# Patient Record
Sex: MALE | Race: WHITE | NOT HISPANIC OR LATINO | Employment: PART TIME | URBAN - METROPOLITAN AREA
[De-identification: names, ages, dates, MRNs, and addresses within clinical notes are randomized per-mention and may not be internally consistent; named-entity substitution may affect disease eponyms.]

---

## 2023-08-02 ENCOUNTER — HOSPITAL ENCOUNTER (OUTPATIENT)
Dept: DATA CONVERSION | Facility: HOSPITAL | Age: 68
End: 2023-08-02
Attending: UROLOGY | Admitting: UROLOGY
Payer: MEDICARE

## 2023-08-02 DIAGNOSIS — N20.0 CALCULUS OF KIDNEY: ICD-10-CM

## 2023-09-29 VITALS — WEIGHT: 189.15 LBS | HEIGHT: 64 IN | BODY MASS INDEX: 32.29 KG/M2

## 2023-10-01 NOTE — OP NOTE
PROCEDURE DETAILS    Preoperative Diagnosis:  Calcium kidney stone, N20.0    Postoperative Diagnosis:  Calcium kidney stone, N20.0    Surgeon: Adrian Renner  Resident/Fellow/Other Assistant: None of these were associated with this case    Procedure:  1. LEFT ESWL    Anesthesia: No anesthesiologist associated with this case  Estimated Blood Loss: 0  Findings: Left renal ESWL at [facility]  Specimens(s) Collected: no,     Complications: None  Patient Returned To/Condition: Care    Date of Dictation: 02-Aug-2023  Dictated By: SHARLENE  Dictation Job Number: [id number]                            Attestation:   Note Completion:  Attending Attestation I performed the procedure without a resident         Electronic Signatures:  Adrian Renner)  (Signed 02-Aug-2023 14:21)   Authored: Post-Operative Note, Chart Review, Note Completion      Last Updated: 02-Aug-2023 14:21 by Adrian Renner)

## 2023-10-02 ENCOUNTER — HOSPITAL ENCOUNTER (OUTPATIENT)
Dept: RADIOLOGY | Facility: HOSPITAL | Age: 68
Discharge: HOME | End: 2023-10-02
Payer: MEDICARE

## 2023-10-02 DIAGNOSIS — C18.7 MALIGNANT NEOPLASM OF SIGMOID COLON (MULTI): ICD-10-CM

## 2023-10-02 PROCEDURE — 71260 CT THORAX DX C+: CPT

## 2023-10-02 PROCEDURE — 71260 CT THORAX DX C+: CPT | Performed by: RADIOLOGY

## 2023-10-02 PROCEDURE — 74177 CT ABD & PELVIS W/CONTRAST: CPT | Performed by: RADIOLOGY

## 2023-10-02 PROCEDURE — 2550000001 HC RX 255 CONTRASTS: Performed by: INTERNAL MEDICINE

## 2023-10-02 PROCEDURE — 74177 CT ABD & PELVIS W/CONTRAST: CPT

## 2023-10-02 RX ADMIN — IOHEXOL 75 ML: 350 INJECTION, SOLUTION INTRAVENOUS at 11:58

## 2023-10-06 PROBLEM — I25.10 CAD (CORONARY ARTERY DISEASE): Status: ACTIVE | Noted: 2023-10-06

## 2023-10-06 PROBLEM — R07.89 CHEST PAIN, ATYPICAL: Status: ACTIVE | Noted: 2023-10-06

## 2023-10-06 PROBLEM — I49.5 SICK SINUS SYNDROME (MULTI): Status: ACTIVE | Noted: 2023-10-06

## 2023-10-06 PROBLEM — E78.5 HYPERLIPIDEMIA: Status: ACTIVE | Noted: 2023-10-06

## 2023-10-06 PROBLEM — R22.9 SUBCUTANEOUS MASS: Status: ACTIVE | Noted: 2023-10-06

## 2023-10-06 PROBLEM — C18.9 COLON ADENOCARCINOMA (MULTI): Status: ACTIVE | Noted: 2023-10-06

## 2023-10-06 PROBLEM — D3A.8: Status: ACTIVE | Noted: 2023-10-06

## 2023-10-06 PROBLEM — R19.00 ABDOMINAL MASS: Status: ACTIVE | Noted: 2023-10-06

## 2023-10-06 PROBLEM — K63.5 COLON POLYPS: Status: ACTIVE | Noted: 2023-10-06

## 2023-10-06 PROBLEM — K76.9 LIVER LESION, LEFT LOBE: Status: ACTIVE | Noted: 2023-10-06

## 2023-10-06 PROBLEM — R00.1 BRADYCARDIA: Status: ACTIVE | Noted: 2023-10-06

## 2023-10-06 PROBLEM — Z85.038 HISTORY OF COLON CANCER: Status: ACTIVE | Noted: 2023-10-06

## 2023-10-06 PROBLEM — K21.9 ACID REFLUX: Status: ACTIVE | Noted: 2023-10-06

## 2023-10-06 PROBLEM — I10 HTN (HYPERTENSION): Status: ACTIVE | Noted: 2023-10-06

## 2023-10-06 PROBLEM — Z95.828 PORT-A-CATH IN PLACE: Status: ACTIVE | Noted: 2023-10-06

## 2023-10-06 PROBLEM — Z95.1 S/P CABG X 4: Status: ACTIVE | Noted: 2023-10-06

## 2023-10-06 PROBLEM — N28.9 KIDNEY LESION, NATIVE, LEFT: Status: ACTIVE | Noted: 2023-10-06

## 2023-10-06 RX ORDER — METOPROLOL SUCCINATE 25 MG/1
25 TABLET, EXTENDED RELEASE ORAL EVERY 24 HOURS
COMMUNITY
Start: 2022-04-23 | End: 2023-12-01 | Stop reason: SDUPTHER

## 2023-10-06 RX ORDER — LISINOPRIL 20 MG/1
20 TABLET ORAL EVERY 24 HOURS
COMMUNITY
End: 2023-12-01 | Stop reason: SDUPTHER

## 2023-10-06 RX ORDER — CHOLESTYRAMINE 4 G/9G
1 POWDER, FOR SUSPENSION ORAL
COMMUNITY
Start: 2021-11-11 | End: 2023-12-01 | Stop reason: ALTCHOICE

## 2023-10-06 RX ORDER — BUPROPION HYDROCHLORIDE 100 MG/1
100 TABLET, FILM COATED ORAL EVERY 24 HOURS
COMMUNITY
Start: 2022-04-13 | End: 2023-12-01 | Stop reason: SDUPTHER

## 2023-10-06 RX ORDER — CITALOPRAM 40 MG/1
40 TABLET, FILM COATED ORAL DAILY
COMMUNITY
Start: 2018-08-03 | End: 2023-12-01 | Stop reason: SDUPTHER

## 2023-10-06 RX ORDER — CLONAZEPAM 0.5 MG/1
0.5 TABLET ORAL 2 TIMES DAILY
COMMUNITY
End: 2023-12-01 | Stop reason: SDUPTHER

## 2023-10-06 RX ORDER — AMLODIPINE BESYLATE 5 MG/1
5 TABLET ORAL EVERY 24 HOURS
COMMUNITY
Start: 2022-03-21 | End: 2023-12-01 | Stop reason: SDUPTHER

## 2023-10-06 RX ORDER — GABAPENTIN 300 MG/1
300 CAPSULE ORAL EVERY 12 HOURS
COMMUNITY
Start: 2021-09-24 | End: 2023-11-09

## 2023-10-06 RX ORDER — ATORVASTATIN CALCIUM 80 MG/1
80 TABLET, FILM COATED ORAL
COMMUNITY
Start: 2018-05-04 | End: 2023-12-01 | Stop reason: SDUPTHER

## 2023-10-09 ENCOUNTER — TELEPHONE (OUTPATIENT)
Dept: PRIMARY CARE | Facility: CLINIC | Age: 68
End: 2023-10-09
Payer: MEDICARE

## 2023-10-09 DIAGNOSIS — U07.1 COVID: Primary | ICD-10-CM

## 2023-10-09 RX ORDER — NIRMATRELVIR AND RITONAVIR 300-100 MG
3 KIT ORAL 2 TIMES DAILY
Qty: 30 TABLET | Refills: 0 | Status: SHIPPED | OUTPATIENT
Start: 2023-10-09 | End: 2023-10-09 | Stop reason: SDUPTHER

## 2023-10-09 RX ORDER — NIRMATRELVIR AND RITONAVIR 300-100 MG
3 KIT ORAL 2 TIMES DAILY
Qty: 30 TABLET | Refills: 0 | Status: SHIPPED
Start: 2023-10-09 | End: 2023-12-01 | Stop reason: ALTCHOICE

## 2023-10-09 NOTE — PROGRESS NOTES
Subjective   Reason for Visit: Marnie Jha is an 68 y.o. male here for a Medicare Wellness visit.               HPI    No care team member to display     Review of Systems    Objective   Vitals:  There were no vitals taken for this visit.      Physical Exam    Assessment/Plan   Problem List Items Addressed This Visit    None

## 2023-10-09 NOTE — TELEPHONE ENCOUNTER
Pts wife called stating Marnie has covid and was wondering if you could send in paxlovid for him to the [facility] pharmacy on file. He is not a pt of yours yet but he does have an appt scheduled for Dec 1st.

## 2023-10-10 ENCOUNTER — TELEMEDICINE (OUTPATIENT)
Dept: GASTROENTEROLOGY | Facility: CLINIC | Age: 68
End: 2023-10-10
Payer: MEDICARE

## 2023-10-10 DIAGNOSIS — Z12.11 ENCOUNTER FOR SCREENING COLONOSCOPY: Primary | ICD-10-CM

## 2023-10-10 PROCEDURE — 99214 OFFICE O/P EST MOD 30 MIN: CPT | Performed by: NURSE PRACTITIONER

## 2023-10-10 RX ORDER — SOD SULF/POT CHLORIDE/MAG SULF 1.479 G
1 TABLET ORAL ONCE
Qty: 1 TABLET | Refills: 0 | Status: SHIPPED | OUTPATIENT
Start: 2023-10-10 | End: 2023-10-10

## 2023-10-10 NOTE — PROGRESS NOTES
This note was created using voice recognition transcription software. Despite proofreading, unintentional typographical errors may be present. Please contact the GI office with any questions or concerns.     This note was created using voice recognition transcription software. Despite proofreading, unintentional typographical errors may be present. Please contact the GI office with any questions or concerns.     This is a 67 yo M who preferred a phone call today for a follow-up.   He comes to the appointment unaccompanied. He is an established patient of Dr. Schultz. LOV 10/28/21. I saw him on 8/31/23 for a colonoscopy screening.  He has a PMH of duodenal neuroendocrine tumor stage III and colonic adenocarcinoma s/p sigmoid colectomy, distal gastrectomy, and chemo (7/30/20 with Dr. Gonzalez), CAD s/p CABG x 4 on ASA, aortic dilation, nephrolithiasis s/p lithotripsy, one episode of A fib on Eliquis (reportedly resolved per him and taken of AC after 2 weeks),HTN, HLD, COPD, ROBERT.     8/31/23-Has mild constipation and has a BM every 2 days. Doesn't take anything for it. Reports having burning in his esophagus and chest so bad that the acid goes into his nose in the middle of the night. Admits to eating the wrong foods at times. On Omeprazole 20 mg bid and Famotidine (unknown dose/frequency). Somewhat helpful.      10/10/23-Procedures not scheduled until November. He started taking a fiber capsule twice daily and now has a BM daily with no constipation.  He is still on a PPI.  Denies heartburn.           EGD-10/8/21 showing medium sized HH and gastritis, 8/24/20, 7/17/20  Colonoscopy-10/8/21 showing grade I internal hemorrhoids, 1-5 mm polyp in transverse colon, diverticulosis in entire colon; repeat in 2 years. 7/17/20  BM-Everyday.  Taking a fiber capsule twice a day.  Denies constipation.  No rectal bleeding or weight los.    FHX-Denies   SHX-Denies   Ab Sx-Sigmoid resection (7/30/20), olimpia  NSAIDs-Just started Aleve for  arthritis and headaches.      Current Medications: reviewed    A 10 point review of system is negative except for what is mentioned in the HPI    Vital Signs: Reviewed    Physical Exam:  General: no apparent distress, pleasant and cooperative  Skin: Warm and dry, no jaundice  HEENT: No scleral icterus, no conjunctival pallor, normocephalic, atraumatic, mucous membranes moist  Neck: atraumatic, trachea midline, no JVD  Chest: Clear to auscultation bilaterally. No wheezes, rales, or rhonchi  CV: Regular rate and rhythm. Positive S1/S2  Abdomen: no distension, +BS, soft, non-tender to palpation, no rebound tenderness, no guarding, no rigidity, no discernible ascites   Extremities: no lower extremity edema, Chronic pigmentary changes, no cyanosis  Neurological: A&Ox3 , no asterixis  Psychiatric: cooperative     Investigations:  Labs, radiological imaging and cardiac work up were reviewed          Visit Vitals  Smoking Status Never Assessed        Medication Documentation Review Audit    **Prior to Admission medications have not yet been reviewed**          Assessment:    This is a 67 yo M who preferred a phone call today for a follow-up.   He comes to the appointment unaccompanied. He is an established patient of Dr. Schultz. LOV 10/28/21. I saw him on 8/31/23 for a colonoscopy screening.  He has a PMH of duodenal neuroendocrine tumor stage III and colonic adenocarcinoma s/p sigmoid colectomy, distal gastrectomy, and chemo (7/30/20 with Dr. Gonzalez), CAD s/p CABG x 4 on ASA, aortic dilation, nephrolithiasis s/p lithotripsy, one episode of A fib on Eliquis (reportedly resolved per him and taken of AC after 2 weeks),HTN, HLD, COPD, ROBERT.   He denies constipation issues since starting a fiber supplement.  Daily BM's with no pushing/straining.  Had a lot of questions about prep and the procedures in general.    Plan  1.)  Colonoscopy screening-Colonoscopy scheduled 11/13 with Dr. Hayes.  Will send Rx for Sutab as he refuses any  other prep.  Explained prep is important for a successful exam and if he experiences constipation, he should call the office for further instructions.  EGD is scheduled for this day as well.  2.)  Follow up 2-3 weeks after procedure.        Total time spent was 30 minutes.   Actual phone call was about 11 minutes.

## 2023-11-02 ENCOUNTER — PREP FOR PROCEDURE (OUTPATIENT)
Dept: GASTROENTEROLOGY | Facility: HOSPITAL | Age: 68
End: 2023-11-02
Payer: MEDICARE

## 2023-11-08 DIAGNOSIS — G62.9 NEUROPATHY: Primary | ICD-10-CM

## 2023-11-09 ENCOUNTER — TELEPHONE (OUTPATIENT)
Dept: CARDIOLOGY | Facility: CLINIC | Age: 68
End: 2023-11-09
Payer: MEDICARE

## 2023-11-09 RX ORDER — GABAPENTIN 300 MG/1
300 CAPSULE ORAL 2 TIMES DAILY
Qty: 180 CAPSULE | Refills: 0 | Status: SHIPPED | OUTPATIENT
Start: 2023-11-09 | End: 2023-11-13 | Stop reason: SDUPTHER

## 2023-11-10 ENCOUNTER — TELEPHONE (OUTPATIENT)
Dept: PREADMISSION TESTING | Facility: HOSPITAL | Age: 68
End: 2023-11-10

## 2023-11-12 ENCOUNTER — HOSPITAL ENCOUNTER (EMERGENCY)
Facility: HOSPITAL | Age: 68
Discharge: HOME | End: 2023-11-12
Attending: EMERGENCY MEDICINE
Payer: MEDICARE

## 2023-11-12 ENCOUNTER — APPOINTMENT (OUTPATIENT)
Dept: RADIOLOGY | Facility: HOSPITAL | Age: 68
End: 2023-11-12
Payer: MEDICARE

## 2023-11-12 VITALS
HEART RATE: 70 BPM | TEMPERATURE: 99.1 F | WEIGHT: 180 LBS | RESPIRATION RATE: 26 BRPM | SYSTOLIC BLOOD PRESSURE: 146 MMHG | BODY MASS INDEX: 30.73 KG/M2 | HEIGHT: 64 IN | DIASTOLIC BLOOD PRESSURE: 84 MMHG | OXYGEN SATURATION: 98 %

## 2023-11-12 DIAGNOSIS — R07.9 CHEST PAIN, UNSPECIFIED TYPE: Primary | ICD-10-CM

## 2023-11-12 LAB
ALBUMIN SERPL BCP-MCNC: 4.2 G/DL (ref 3.4–5)
ALP SERPL-CCNC: 81 U/L (ref 33–136)
ALT SERPL W P-5'-P-CCNC: 19 U/L (ref 10–52)
ANION GAP SERPL CALC-SCNC: 10 MMOL/L (ref 10–20)
AST SERPL W P-5'-P-CCNC: 22 U/L (ref 9–39)
BASOPHILS # BLD AUTO: 0.02 X10*3/UL (ref 0–0.1)
BASOPHILS NFR BLD AUTO: 0.4 %
BILIRUB SERPL-MCNC: 0.8 MG/DL (ref 0–1.2)
BUN SERPL-MCNC: 18 MG/DL (ref 6–23)
CALCIUM SERPL-MCNC: 8.6 MG/DL (ref 8.6–10.3)
CARDIAC TROPONIN I PNL SERPL HS: 3 NG/L (ref 0–20)
CARDIAC TROPONIN I PNL SERPL HS: 3 NG/L (ref 0–20)
CHLORIDE SERPL-SCNC: 102 MMOL/L (ref 98–107)
CO2 SERPL-SCNC: 26 MMOL/L (ref 21–32)
CREAT SERPL-MCNC: 1.1 MG/DL (ref 0.5–1.3)
EOSINOPHIL # BLD AUTO: 0.19 X10*3/UL (ref 0–0.7)
EOSINOPHIL NFR BLD AUTO: 3.6 %
ERYTHROCYTE [DISTWIDTH] IN BLOOD BY AUTOMATED COUNT: 13.6 % (ref 11.5–14.5)
GFR SERPL CREATININE-BSD FRML MDRD: 74 ML/MIN/1.73M*2
GLUCOSE SERPL-MCNC: 93 MG/DL (ref 74–99)
HCT VFR BLD AUTO: 42.7 % (ref 41–52)
HGB BLD-MCNC: 14.4 G/DL (ref 13.5–17.5)
IMM GRANULOCYTES # BLD AUTO: 0.02 X10*3/UL (ref 0–0.7)
IMM GRANULOCYTES NFR BLD AUTO: 0.4 % (ref 0–0.9)
LYMPHOCYTES # BLD AUTO: 0.29 X10*3/UL (ref 1.2–4.8)
LYMPHOCYTES NFR BLD AUTO: 5.5 %
MAGNESIUM SERPL-MCNC: 1.83 MG/DL (ref 1.6–2.4)
MCH RBC QN AUTO: 31.2 PG (ref 26–34)
MCHC RBC AUTO-ENTMCNC: 33.7 G/DL (ref 32–36)
MCV RBC AUTO: 92 FL (ref 80–100)
MONOCYTES # BLD AUTO: 0.6 X10*3/UL (ref 0.1–1)
MONOCYTES NFR BLD AUTO: 11.5 %
NEUTROPHILS # BLD AUTO: 4.11 X10*3/UL (ref 1.2–7.7)
NEUTROPHILS NFR BLD AUTO: 78.6 %
NRBC BLD-RTO: 0 /100 WBCS (ref 0–0)
PLATELET # BLD AUTO: 129 X10*3/UL (ref 150–450)
POTASSIUM SERPL-SCNC: 4.4 MMOL/L (ref 3.5–5.3)
PROT SERPL-MCNC: 6.7 G/DL (ref 6.4–8.2)
RBC # BLD AUTO: 4.62 X10*6/UL (ref 4.5–5.9)
SODIUM SERPL-SCNC: 134 MMOL/L (ref 136–145)
WBC # BLD AUTO: 5.2 X10*3/UL (ref 4.4–11.3)

## 2023-11-12 PROCEDURE — 36415 COLL VENOUS BLD VENIPUNCTURE: CPT | Performed by: EMERGENCY MEDICINE

## 2023-11-12 PROCEDURE — 2550000001 HC RX 255 CONTRASTS: Performed by: EMERGENCY MEDICINE

## 2023-11-12 PROCEDURE — 71260 CT THORAX DX C+: CPT | Performed by: RADIOLOGY

## 2023-11-12 PROCEDURE — 85025 COMPLETE CBC W/AUTO DIFF WBC: CPT | Performed by: EMERGENCY MEDICINE

## 2023-11-12 PROCEDURE — 71045 X-RAY EXAM CHEST 1 VIEW: CPT | Performed by: RADIOLOGY

## 2023-11-12 PROCEDURE — 84075 ASSAY ALKALINE PHOSPHATASE: CPT | Performed by: EMERGENCY MEDICINE

## 2023-11-12 PROCEDURE — 99285 EMERGENCY DEPT VISIT HI MDM: CPT | Mod: 25 | Performed by: EMERGENCY MEDICINE

## 2023-11-12 PROCEDURE — 84484 ASSAY OF TROPONIN QUANT: CPT | Performed by: EMERGENCY MEDICINE

## 2023-11-12 PROCEDURE — 96374 THER/PROPH/DIAG INJ IV PUSH: CPT

## 2023-11-12 PROCEDURE — 71260 CT THORAX DX C+: CPT

## 2023-11-12 PROCEDURE — 83735 ASSAY OF MAGNESIUM: CPT | Performed by: EMERGENCY MEDICINE

## 2023-11-12 PROCEDURE — 2500000004 HC RX 250 GENERAL PHARMACY W/ HCPCS (ALT 636 FOR OP/ED): Performed by: EMERGENCY MEDICINE

## 2023-11-12 PROCEDURE — 96375 TX/PRO/DX INJ NEW DRUG ADDON: CPT | Mod: 59

## 2023-11-12 PROCEDURE — 71045 X-RAY EXAM CHEST 1 VIEW: CPT

## 2023-11-12 RX ORDER — MORPHINE SULFATE 4 MG/ML
4 INJECTION, SOLUTION INTRAMUSCULAR; INTRAVENOUS ONCE
Status: COMPLETED | OUTPATIENT
Start: 2023-11-12 | End: 2023-11-12

## 2023-11-12 RX ORDER — ONDANSETRON HYDROCHLORIDE 2 MG/ML
4 INJECTION, SOLUTION INTRAVENOUS ONCE
Status: COMPLETED | OUTPATIENT
Start: 2023-11-12 | End: 2023-11-12

## 2023-11-12 RX ADMIN — MORPHINE SULFATE 4 MG: 4 INJECTION, SOLUTION INTRAMUSCULAR; INTRAVENOUS at 17:53

## 2023-11-12 RX ADMIN — IOHEXOL 75 ML: 350 INJECTION, SOLUTION INTRAVENOUS at 18:26

## 2023-11-12 RX ADMIN — ONDANSETRON 4 MG: 2 INJECTION INTRAMUSCULAR; INTRAVENOUS at 17:52

## 2023-11-12 ASSESSMENT — PAIN SCALES - GENERAL
PAINLEVEL_OUTOF10: 6
PAINLEVEL_OUTOF10: 5 - MODERATE PAIN

## 2023-11-12 ASSESSMENT — PAIN DESCRIPTION - LOCATION
LOCATION: HEAD
LOCATION: CHEST

## 2023-11-12 ASSESSMENT — COLUMBIA-SUICIDE SEVERITY RATING SCALE - C-SSRS
2. HAVE YOU ACTUALLY HAD ANY THOUGHTS OF KILLING YOURSELF?: NO
6. HAVE YOU EVER DONE ANYTHING, STARTED TO DO ANYTHING, OR PREPARED TO DO ANYTHING TO END YOUR LIFE?: NO
1. IN THE PAST MONTH, HAVE YOU WISHED YOU WERE DEAD OR WISHED YOU COULD GO TO SLEEP AND NOT WAKE UP?: NO

## 2023-11-12 ASSESSMENT — PAIN - FUNCTIONAL ASSESSMENT
PAIN_FUNCTIONAL_ASSESSMENT: 0-10
PAIN_FUNCTIONAL_ASSESSMENT: 0-10

## 2023-11-12 ASSESSMENT — PAIN DESCRIPTION - PAIN TYPE: TYPE: ACUTE PAIN

## 2023-11-12 ASSESSMENT — PAIN DESCRIPTION - ORIENTATION: ORIENTATION: MID

## 2023-11-12 NOTE — ED PROVIDER NOTES
HPI   Chief Complaint   Patient presents with    Chest Pain     Pt states that intermittent Chest discomfort started a couple weeks ago but has gotten worse resulting in today's visit, pt is SOB but no radiating pain, no chills, or fevers at home - Pt has hx of quadruple bypass, pacemaker, colon cancer       68-year-old male who presents with chest pain.  Patient states he has been having chest pressure for the past week.  States has been intermittent over the past week.  He does have a pacemaker present.  He is also had a history of a quadruple bypass approximately 5 years ago.  States that he supposed have a colonoscopy done tomorrow he came in because he was concerned if he was put out for the colonoscopy and he was having something wrong with his heart he wanted it checked out prior to.  He denies any shortness of breath.  Denies any PE or DVT.                          No data recorded                Patient History   Past Medical History:   Diagnosis Date    Personal history of other diseases of the circulatory system     History of cardiac disorder    Personal history of other specified conditions     History of chest pain     Past Surgical History:   Procedure Laterality Date    CT HEAD ANGIO W AND WO IV CONTRAST  3/19/2022    CT HEAD ANGIO W AND WO IV CONTRAST 3/19/2022 [facility]    CT NECK ANGIO W AND WO IV CONTRAST  3/19/2022    CT NECK ANGIO W AND WO IV CONTRAST 3/19/2022 [facility]    OTHER SURGICAL HISTORY  01/17/2020    Cardiac catheterization    OTHER SURGICAL HISTORY  01/17/2020    Heart surgery    OTHER SURGICAL HISTORY  07/30/2021    Laparoscopic sigmoidectomy     Family History   Problem Relation Name Age of Onset    Hypertension Mother      Hypertension Father      Other (cardiac disorder) Father      Coronary artery disease Father       Social History     Tobacco Use    Smoking status: Not on file    Smokeless tobacco: Not on file   Substance Use Topics    Alcohol use: Not  on file    Drug use: Not on file       Physical Exam   ED Triage Vitals [11/12/23 1521]   Temp Heart Rate Resp BP   37.3 °C (99.1 °F) 71 20 (!) 151/99      SpO2 Temp Source Heart Rate Source Patient Position   98 % Tympanic Monitor Sitting      BP Location FiO2 (%)     Right arm --       Physical Exam  Constitutional:       Appearance: Normal appearance. He is normal weight.   HENT:      Head: Normocephalic and atraumatic.      Nose: Nose normal.      Mouth/Throat:      Mouth: Mucous membranes are moist.      Pharynx: Oropharynx is clear.   Eyes:      Extraocular Movements: Extraocular movements intact.      Conjunctiva/sclera: Conjunctivae normal.      Pupils: Pupils are equal, round, and reactive to light.   Cardiovascular:      Rate and Rhythm: Normal rate and regular rhythm.   Pulmonary:      Effort: Pulmonary effort is normal.      Breath sounds: Normal breath sounds.   Abdominal:      General: Abdomen is flat. Bowel sounds are normal.      Palpations: Abdomen is soft.   Musculoskeletal:         General: Normal range of motion.      Cervical back: Normal range of motion and neck supple.   Skin:     General: Skin is warm and dry.      Capillary Refill: Capillary refill takes less than 2 seconds.   Neurological:      General: No focal deficit present.      Mental Status: He is alert.   Psychiatric:         Mood and Affect: Mood normal.         Behavior: Behavior normal.         Thought Content: Thought content normal.         Judgment: Judgment normal.       Labs Reviewed   CBC WITH AUTO DIFFERENTIAL - Abnormal       Result Value    WBC 5.2      nRBC 0.0      RBC 4.62      Hemoglobin 14.4      Hematocrit 42.7      MCV 92      MCH 31.2      MCHC 33.7      RDW 13.6      Platelets 129 (*)     Neutrophils % 78.6      Immature Granulocytes %, Automated 0.4      Lymphocytes % 5.5      Monocytes % 11.5      Eosinophils % 3.6      Basophils % 0.4      Neutrophils Absolute 4.11      Immature Granulocytes Absolute,  Automated 0.02      Lymphocytes Absolute 0.29 (*)     Monocytes Absolute 0.60      Eosinophils Absolute 0.19      Basophils Absolute 0.02     COMPREHENSIVE METABOLIC PANEL - Abnormal    Glucose 93      Sodium 134 (*)     Potassium 4.4      Chloride 102      Bicarbonate 26      Anion Gap 10      Urea Nitrogen 18      Creatinine 1.10      eGFR 74      Calcium 8.6      Albumin 4.2      Alkaline Phosphatase 81      Total Protein 6.7      AST 22      Bilirubin, Total 0.8      ALT 19     MAGNESIUM - Normal    Magnesium 1.83     SERIAL TROPONIN-INITIAL - Normal    Troponin I, High Sensitivity 3      Narrative:     Less than 99th percentile of normal range cutoff-  Female and children under 18 years old <14 ng/L; Male <21 ng/L: Negative  Repeat testing should be performed if clinically indicated.     Female and children under 18 years old 14-50 ng/L; Male 21-50 ng/L:  Consistent with possible cardiac damage and possible increased clinical   risk. Serial measurements may help to assess extent of myocardial damage.     >50 ng/L: Consistent with cardiac damage, increased clinical risk and  myocardial infarction. Serial measurements may help assess extent of   myocardial damage.      NOTE: Children less than 1 year old may have higher baseline troponin   levels and results should be interpreted in conjunction with the overall   clinical context.     NOTE: Troponin I testing is performed using a different   testing methodology at [facility] than at other   [facility]. Direct result comparisons should only   be made within the same method.   TROPONIN I, HIGH SENSITIVITY - Normal    Troponin I, High Sensitivity 3      Narrative:     Less than 99th percentile of normal range cutoff-  Female and children under 18 years old <14 ng/L; Male <21 ng/L: Negative  Repeat testing should be performed if clinically indicated.     Female and children under 18 years old 14-50 ng/L; Male 21-50 ng/L:  Consistent with possible  cardiac damage and possible increased clinical   risk. Serial measurements may help to assess extent of myocardial damage.     >50 ng/L: Consistent with cardiac damage, increased clinical risk and  myocardial infarction. Serial measurements may help assess extent of   myocardial damage.      NOTE: Children less than 1 year old may have higher baseline troponin   levels and results should be interpreted in conjunction with the overall   clinical context.     NOTE: Troponin I testing is performed using a different   testing methodology at [facility] than at other   [facility]. Direct result comparisons should only   be made within the same method.   TROPONIN SERIES- (INITIAL, 1 HR)    Narrative:     The following orders were created for panel order Troponin I Series, High Sensitivity (0, 1 HR).  Procedure                               Abnormality         Status                     ---------                               -----------         ------                     Troponin I, High Sensiti...[[id number]]  Normal              Final result               Troponin, High Sensitivi...[[id number]]                                                   Please view results for these tests on the individual orders.   SERIAL TROPONIN, 1 HOUR   CREATININE       CT chest w IV contrast   Final Result   No acute cardiopulmonary process.        Small hiatal hernia with mild circumferential distal esophageal wall   thickening. Please correlate for reflux esophagitis.        MACRO:   None        Signed by: Carley Marie 11/12/2023 7:16 PM   Dictation workstation:   [id number]      XR chest 1 view   Final Result   No acute cardiopulmonary abnormality.        Signed by: Fitz Christie 11/12/2023 4:08 PM   Dictation workstation:   [id number]            ED Course & MDM   Diagnoses as of 11/12/23 1929   Chest pain, unspecified type       Medical Decision Making  Emergency department course, initial EKG shows a paced  rhythm and repeat also shows a paced rhythm at a rate of 71.  Laboratory studies were obtained and reviewed which were unremarkable.  Initial troponin was 3 and repeat was also 3.  CT of the chest with IV contrast was obtained which showed a proximal aneurysmal dilatation of the aorta measuring 4.4 cm which is stable.  I did discuss the case with Dr. Ramirez is covering for the patient's cardiologist Dr. Méndez, he is agreeable with sending the patient home with 2 negative troponins and a CT of the chest that is unremarkable.  Patient's blood pressure is 140/85 I feel comfortable discharging him home to follow-up as an outpatient.  Patient is agreeable with this plan.    Amount and/or Complexity of Data Reviewed  ECG/medicine tests: independent interpretation performed.     Details: EKG shows a paced rhythm at a rate of 74, interpreted by ED physician.        Procedure  Procedures     Beatris Fairbanks DO  11/12/23 1929

## 2023-11-13 ENCOUNTER — TELEPHONE (OUTPATIENT)
Dept: CARDIOLOGY | Facility: CLINIC | Age: 68
End: 2023-11-13
Payer: MEDICARE

## 2023-11-13 ENCOUNTER — PREP FOR PROCEDURE (OUTPATIENT)
Dept: GASTROENTEROLOGY | Facility: HOSPITAL | Age: 68
End: 2023-11-13

## 2023-11-13 ENCOUNTER — ANESTHESIA (OUTPATIENT)
Dept: GASTROENTEROLOGY | Facility: HOSPITAL | Age: 68
End: 2023-11-13
Payer: MEDICARE

## 2023-11-13 ENCOUNTER — ANESTHESIA EVENT (OUTPATIENT)
Dept: GASTROENTEROLOGY | Facility: HOSPITAL | Age: 68
End: 2023-11-13

## 2023-11-13 DIAGNOSIS — G62.9 NEUROPATHY: ICD-10-CM

## 2023-11-13 RX ORDER — GABAPENTIN 300 MG/1
300 CAPSULE ORAL 2 TIMES DAILY
Qty: 180 CAPSULE | Refills: 0 | Status: SHIPPED | OUTPATIENT
Start: 2023-11-13 | End: 2023-12-01 | Stop reason: SDUPTHER

## 2023-11-13 RX ORDER — SODIUM CHLORIDE, SODIUM LACTATE, POTASSIUM CHLORIDE, CALCIUM CHLORIDE 600; 310; 30; 20 MG/100ML; MG/100ML; MG/100ML; MG/100ML
20 INJECTION, SOLUTION INTRAVENOUS CONTINUOUS
Status: CANCELLED | OUTPATIENT
Start: 2023-11-13

## 2023-11-20 ENCOUNTER — OFFICE VISIT (OUTPATIENT)
Dept: CARDIOLOGY | Facility: CLINIC | Age: 68
End: 2023-11-20
Payer: MEDICARE

## 2023-11-20 VITALS
BODY MASS INDEX: 32.06 KG/M2 | OXYGEN SATURATION: 96 % | WEIGHT: 187.8 LBS | HEIGHT: 64 IN | DIASTOLIC BLOOD PRESSURE: 68 MMHG | SYSTOLIC BLOOD PRESSURE: 116 MMHG | HEART RATE: 72 BPM

## 2023-11-20 DIAGNOSIS — I77.810 ASCENDING AORTA DILATATION (CMS-HCC): ICD-10-CM

## 2023-11-20 DIAGNOSIS — I48.0 PAROXYSMAL ATRIAL FIBRILLATION (MULTI): ICD-10-CM

## 2023-11-20 DIAGNOSIS — E78.00 PURE HYPERCHOLESTEROLEMIA: ICD-10-CM

## 2023-11-20 DIAGNOSIS — I10 PRIMARY HYPERTENSION: ICD-10-CM

## 2023-11-20 DIAGNOSIS — I49.5 SICK SINUS SYNDROME (MULTI): ICD-10-CM

## 2023-11-20 DIAGNOSIS — Z95.1 S/P CABG X 4: ICD-10-CM

## 2023-11-20 DIAGNOSIS — I25.10 CORONARY ARTERY DISEASE INVOLVING NATIVE CORONARY ARTERY OF NATIVE HEART WITHOUT ANGINA PECTORIS: Primary | ICD-10-CM

## 2023-11-20 PROCEDURE — 3078F DIAST BP <80 MM HG: CPT | Performed by: INTERNAL MEDICINE

## 2023-11-20 PROCEDURE — 1125F AMNT PAIN NOTED PAIN PRSNT: CPT | Performed by: INTERNAL MEDICINE

## 2023-11-20 PROCEDURE — 99214 OFFICE O/P EST MOD 30 MIN: CPT | Performed by: INTERNAL MEDICINE

## 2023-11-20 PROCEDURE — 1160F RVW MEDS BY RX/DR IN RCRD: CPT | Performed by: INTERNAL MEDICINE

## 2023-11-20 PROCEDURE — 1159F MED LIST DOCD IN RCRD: CPT | Performed by: INTERNAL MEDICINE

## 2023-11-20 PROCEDURE — 3074F SYST BP LT 130 MM HG: CPT | Performed by: INTERNAL MEDICINE

## 2023-11-20 PROCEDURE — 3008F BODY MASS INDEX DOCD: CPT | Performed by: INTERNAL MEDICINE

## 2023-11-20 NOTE — ASSESSMENT & PLAN NOTE
[facility] Dr. Jong CUNHA to LAD, Left Rad to PDA and distal PDA, sequential SVG to distal OMG, 4/3/19  stress thallium negative to 7.0 METS

## 2023-11-20 NOTE — PROGRESS NOTES
"  Subjective  Marnie Jha  is a 68 y.o. year old male who presents for seen [facility] 11/12/23 with initial chest pain and found to have a URI.  No chest pain. 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      Blood pressure 116/68, pulse 72, height 1.626 m (5' 4\"), weight 85.2 kg (187 lb 12.8 oz), SpO2 96 %.   Meperidine  Past Medical History:   Diagnosis Date    Personal history of other diseases of the circulatory system     History of cardiac disorder    Personal history of other specified conditions     History of chest pain     Past Surgical History:   Procedure Laterality Date    CT ANGIO NECK  3/19/2022    CT NECK ANGIO W AND WO IV CONTRAST 3/19/2022 [facility]    CT HEAD ANGIO W AND WO IV CONTRAST  3/19/2022    CT HEAD ANGIO W AND WO IV CONTRAST 3/19/2022 [facility]    OTHER SURGICAL HISTORY  01/17/2020    Cardiac catheterization    OTHER SURGICAL HISTORY  01/17/2020    Heart surgery    OTHER SURGICAL HISTORY  07/30/2021    Laparoscopic sigmoidectomy     Family History   Problem Relation Name Age of Onset    Hypertension Mother      Hypertension Father      Other (cardiac disorder) Father      Coronary artery disease Father       @SOC    Current Outpatient Medications   Medication Sig Dispense Refill    atorvastatin (Lipitor) 80 mg tablet Take 1 tablet (80 mg) by mouth.      cholestyramine (Questran) 4 gram packet Take 1 packet (4 g) by mouth once daily.      citalopram (CeleXA) 40 mg tablet Take 1 tablet (40 mg) by mouth once daily.      clonazePAM (KlonoPIN) 0.5 mg tablet Take 1 tablet (0.5 mg) by mouth 2 times a day.      gabapentin (Neurontin) 300 mg capsule Take 1 capsule (300 mg) by mouth 2 times a day. 180 capsule 0    lisinopril 20 mg tablet Take 1 tablet (20 mg) by mouth once every 24 hours.      metoprolol succinate XL (Toprol-XL) 25 mg 24 hr tablet 1 tablet (25 mg) once every 24 hours.      nirmatrelvir-ritonavir (Paxlovid) 300 mg (150 mg x 2)-100 mg tablet therapy " pack Take 3 tablets by mouth 2 times a day. 30 tablet 0    Norvasc 5 mg tablet Take 1 tablet (5 mg) by mouth once every 24 hours.      Wellbutrin  mg 12 hr tablet Take 1 tablet (100 mg) by mouth once every 24 hours.       No current facility-administered medications for this visit.        ROS  Review of Systems   All other systems reviewed and are negative.      Physical Exam  Physical Exam  Constitutional:       Appearance: Normal appearance.   HENT:      Head: Normocephalic and atraumatic.   Cardiovascular:      Rate and Rhythm: Normal rate and regular rhythm.      Heart sounds: S1 normal and S2 normal.   Pulmonary:      Effort: Pulmonary effort is normal.      Breath sounds: Normal breath sounds.   Musculoskeletal:      Right lower leg: No edema.      Left lower leg: No edema.   Neurological:      Mental Status: He is alert.          EKG  No results found for this or any previous visit (from the past 4464 hour(s)).    Problem List Items Addressed This Visit       CAD (coronary artery disease) - Primary    Relevant Orders    Follow Up In Cardiology    HTN (hypertension)    Hyperlipidemia    Sick sinus syndrome (CMS/HCC)     S/P PPM         S/P CABG x 4     [facility] Dr. Jong CUNHA to LAD, Left Rad to PDA and distal PDA, sequential SVG to distal OMG, 4/3/19  stress thallium negative to 7.0 METS         Ascending aorta dilatation (CMS/HCC)     Followed by Dr. Onel Segal         Paroxysmal atrial fibrillation (CMS/HCC)         No follow-ups on file.  F/U 6 months with EKG      Bassem Méndez MD

## 2023-12-01 ENCOUNTER — OFFICE VISIT (OUTPATIENT)
Dept: PRIMARY CARE | Facility: CLINIC | Age: 68
End: 2023-12-01
Payer: MEDICARE

## 2023-12-01 VITALS
WEIGHT: 189 LBS | BODY MASS INDEX: 32.27 KG/M2 | HEIGHT: 64 IN | HEART RATE: 75 BPM | DIASTOLIC BLOOD PRESSURE: 84 MMHG | OXYGEN SATURATION: 96 % | SYSTOLIC BLOOD PRESSURE: 128 MMHG

## 2023-12-01 DIAGNOSIS — Z00.00 ROUTINE GENERAL MEDICAL EXAMINATION AT HEALTH CARE FACILITY: Primary | ICD-10-CM

## 2023-12-01 DIAGNOSIS — G62.9 NEUROPATHY: ICD-10-CM

## 2023-12-01 DIAGNOSIS — I10 HYPERTENSION, UNSPECIFIED TYPE: ICD-10-CM

## 2023-12-01 DIAGNOSIS — F41.9 ANXIETY: ICD-10-CM

## 2023-12-01 DIAGNOSIS — I77.810 ASCENDING AORTA DILATATION (CMS-HCC): ICD-10-CM

## 2023-12-01 DIAGNOSIS — Z79.899 CONTROLLED SUBSTANCE AGREEMENT SIGNED: ICD-10-CM

## 2023-12-01 DIAGNOSIS — I48.0 PAROXYSMAL ATRIAL FIBRILLATION (MULTI): ICD-10-CM

## 2023-12-01 DIAGNOSIS — I49.5 SICK SINUS SYNDROME (MULTI): ICD-10-CM

## 2023-12-01 LAB
AMPHETAMINES UR QL SCN: NORMAL
BARBITURATES UR QL SCN: NORMAL
BENZODIAZ UR QL SCN: NORMAL
BZE UR QL SCN: NORMAL
CANNABINOIDS UR QL SCN: NORMAL
FENTANYL+NORFENTANYL UR QL SCN: NORMAL
OPIATES UR QL SCN: NORMAL
OXYCODONE+OXYMORPHONE UR QL SCN: NORMAL
PCP UR QL SCN: NORMAL

## 2023-12-01 PROCEDURE — 1160F RVW MEDS BY RX/DR IN RCRD: CPT | Performed by: STUDENT IN AN ORGANIZED HEALTH CARE EDUCATION/TRAINING PROGRAM

## 2023-12-01 PROCEDURE — G0439 PPPS, SUBSEQ VISIT: HCPCS | Performed by: STUDENT IN AN ORGANIZED HEALTH CARE EDUCATION/TRAINING PROGRAM

## 2023-12-01 PROCEDURE — 80307 DRUG TEST PRSMV CHEM ANLYZR: CPT

## 2023-12-01 PROCEDURE — 1036F TOBACCO NON-USER: CPT | Performed by: STUDENT IN AN ORGANIZED HEALTH CARE EDUCATION/TRAINING PROGRAM

## 2023-12-01 PROCEDURE — 99397 PER PM REEVAL EST PAT 65+ YR: CPT | Performed by: STUDENT IN AN ORGANIZED HEALTH CARE EDUCATION/TRAINING PROGRAM

## 2023-12-01 PROCEDURE — 3008F BODY MASS INDEX DOCD: CPT | Performed by: STUDENT IN AN ORGANIZED HEALTH CARE EDUCATION/TRAINING PROGRAM

## 2023-12-01 PROCEDURE — 3074F SYST BP LT 130 MM HG: CPT | Performed by: STUDENT IN AN ORGANIZED HEALTH CARE EDUCATION/TRAINING PROGRAM

## 2023-12-01 PROCEDURE — 1125F AMNT PAIN NOTED PAIN PRSNT: CPT | Performed by: STUDENT IN AN ORGANIZED HEALTH CARE EDUCATION/TRAINING PROGRAM

## 2023-12-01 PROCEDURE — 1170F FXNL STATUS ASSESSED: CPT | Performed by: STUDENT IN AN ORGANIZED HEALTH CARE EDUCATION/TRAINING PROGRAM

## 2023-12-01 PROCEDURE — 3079F DIAST BP 80-89 MM HG: CPT | Performed by: STUDENT IN AN ORGANIZED HEALTH CARE EDUCATION/TRAINING PROGRAM

## 2023-12-01 PROCEDURE — 1159F MED LIST DOCD IN RCRD: CPT | Performed by: STUDENT IN AN ORGANIZED HEALTH CARE EDUCATION/TRAINING PROGRAM

## 2023-12-01 RX ORDER — AMLODIPINE BESYLATE 5 MG/1
5 TABLET ORAL EVERY 24 HOURS
Qty: 90 TABLET | Refills: 1 | Status: SHIPPED | OUTPATIENT
Start: 2023-12-01

## 2023-12-01 RX ORDER — CLONAZEPAM 0.5 MG/1
0.5 TABLET ORAL 2 TIMES DAILY
Qty: 180 TABLET | Refills: 1 | Status: SHIPPED
Start: 2023-12-01 | End: 2023-12-11 | Stop reason: SDUPTHER

## 2023-12-01 RX ORDER — ATORVASTATIN CALCIUM 80 MG/1
80 TABLET, FILM COATED ORAL DAILY
Qty: 90 TABLET | Refills: 1 | Status: SHIPPED | OUTPATIENT
Start: 2023-12-01

## 2023-12-01 RX ORDER — GABAPENTIN 300 MG/1
300 CAPSULE ORAL 2 TIMES DAILY
Qty: 180 CAPSULE | Refills: 0 | Status: SHIPPED | OUTPATIENT
Start: 2023-12-01 | End: 2024-05-14 | Stop reason: SDUPTHER

## 2023-12-01 RX ORDER — BUPROPION HYDROCHLORIDE 100 MG/1
100 TABLET, FILM COATED ORAL EVERY 24 HOURS
Qty: 90 TABLET | Refills: 1 | Status: SHIPPED
Start: 2023-12-01 | End: 2024-01-09 | Stop reason: WASHOUT

## 2023-12-01 RX ORDER — CITALOPRAM 40 MG/1
40 TABLET, FILM COATED ORAL DAILY
Qty: 90 TABLET | Refills: 1 | Status: SHIPPED | OUTPATIENT
Start: 2023-12-01

## 2023-12-01 RX ORDER — METOPROLOL SUCCINATE 25 MG/1
25 TABLET, EXTENDED RELEASE ORAL EVERY 24 HOURS
Qty: 90 TABLET | Refills: 1 | Status: SHIPPED | OUTPATIENT
Start: 2023-12-01 | End: 2024-05-28

## 2023-12-01 RX ORDER — LISINOPRIL 20 MG/1
20 TABLET ORAL EVERY 24 HOURS
Qty: 90 TABLET | Refills: 1 | Status: SHIPPED | OUTPATIENT
Start: 2023-12-01

## 2023-12-01 ASSESSMENT — ACTIVITIES OF DAILY LIVING (ADL)
DRESSING: INDEPENDENT
GROCERY_SHOPPING: INDEPENDENT
MANAGING_FINANCES: INDEPENDENT
TAKING_MEDICATION: INDEPENDENT
BATHING: INDEPENDENT
DOING_HOUSEWORK: INDEPENDENT

## 2023-12-01 ASSESSMENT — PATIENT HEALTH QUESTIONNAIRE - PHQ9
2. FEELING DOWN, DEPRESSED OR HOPELESS: NOT AT ALL
1. LITTLE INTEREST OR PLEASURE IN DOING THINGS: NOT AT ALL
SUM OF ALL RESPONSES TO PHQ9 QUESTIONS 1 AND 2: 0

## 2023-12-01 ASSESSMENT — ENCOUNTER SYMPTOMS
LOSS OF SENSATION IN FEET: 0
DEPRESSION: 0
OCCASIONAL FEELINGS OF UNSTEADINESS: 0

## 2023-12-01 NOTE — PROGRESS NOTES
"Subjective   Patient ID: Marnie Jha is a 68 y.o. male who presents for Medicare Annual Wellness Visit Subsequent.    HPI     CABG: follows cardiology, stable no chest pain, complicated with palpations and pm    Colon cancer: in remission, follows oncology hx of surgery, and chemo, in remission    HTN: stable on BP meds    Anxiety: was given  old pcp has been on  it for 15-20 years, tried to get off by he couldn't so went back on it    OARRS:  No data recorded  I have personally reviewed the OARRS report for Marnie Jha. I have considered the risks of abuse, dependence, addiction and diversion    Is the patient prescribed a combination of a benzodiazepine and opioid?  No    Last Urine Drug Screen / ordered today: Yes  No results found for this or any previous visit (from the past 8760 hour(s)).  Results are as expected.           Controlled Substance Agreement:  Date of the Last Agreement: 12/1/23  Reviewed Controlled Substance Agreement including but not limited to the benefits, risks, and alternatives to treatment with a Controlled Substance medication(s).    Benzodiazepines:  What is the patient's goal of therapy? anxiety  Is this being achieved with current treatment? yes    MARIO-7:  No data recorded    Activities of Daily Living:   Is your overall impression that this patient is benefiting (symptom reduction outweighs side effects) from benzodiazepine therapy? Yes     1. Physical Functioning: Better  2. Family Relationship: Better  3. Social Relationship: Better  4. Mood: Better  5. Sleep Patterns: Better  6. Overall Function: Better    Review of Systems   All other systems reviewed and are negative.      Objective   /84 (BP Location: Left arm, Patient Position: Sitting, BP Cuff Size: Large adult)   Pulse 75   Ht 1.626 m (5' 4\")   Wt 85.7 kg (189 lb)   SpO2 96%   BMI 32.44 kg/m²     Physical Exam  Constitutional:       Appearance: Normal appearance.   HENT:      Head: Normocephalic and " atraumatic.      Right Ear: Tympanic membrane and ear canal normal.      Left Ear: Tympanic membrane and ear canal normal.      Mouth/Throat:      Mouth: Mucous membranes are moist.      Pharynx: Oropharynx is clear.   Eyes:      Extraocular Movements: Extraocular movements intact.      Conjunctiva/sclera: Conjunctivae normal.      Pupils: Pupils are equal, round, and reactive to light.   Cardiovascular:      Rate and Rhythm: Normal rate and regular rhythm.      Pulses: Normal pulses.      Heart sounds: Normal heart sounds.   Pulmonary:      Effort: Pulmonary effort is normal.      Breath sounds: Normal breath sounds.   Abdominal:      General: Abdomen is flat. Bowel sounds are normal.      Palpations: Abdomen is soft.   Musculoskeletal:         General: Normal range of motion.      Cervical back: Normal range of motion and neck supple.   Skin:     General: Skin is warm and dry.      Capillary Refill: Capillary refill takes 2 to 3 seconds.   Neurological:      General: No focal deficit present.      Mental Status: He is alert and oriented to person, place, and time. Mental status is at baseline.   Psychiatric:         Mood and Affect: Mood normal.         Behavior: Behavior normal.         Thought Content: Thought content normal.         Judgment: Judgment normal.         Assessment/Plan   1. Neuropathy  - stable     2. Routine general medical examination at health care facility  - stable on labs  - 1 Year Follow Up In Primary Care - Wellness Exam; Future    3. Ascending aorta dilatation (CMS/HCC)  Follows cardiology stable    4. Paroxysmal atrial fibrillation (CMS/HCC)  Stable no issues follow cardiology    5. Sick sinus syndrome (CMS/HCC)  - stable no issues    6. Anxiety  - Oarrs reviewed  - CSA aggreement  - UDS   - refills given

## 2023-12-04 ENCOUNTER — HOSPITAL ENCOUNTER (OUTPATIENT)
Dept: CARDIOLOGY | Facility: CLINIC | Age: 68
Discharge: HOME | End: 2023-12-04
Payer: MEDICARE

## 2023-12-04 DIAGNOSIS — Z95.0 CARDIAC PACEMAKER IN SITU: ICD-10-CM

## 2023-12-04 DIAGNOSIS — I49.5 SICK SINUS SYNDROME (MULTI): ICD-10-CM

## 2023-12-04 DIAGNOSIS — F41.9 ANXIETY: ICD-10-CM

## 2023-12-04 PROCEDURE — 93294 REM INTERROG EVL PM/LDLS PM: CPT | Performed by: INTERNAL MEDICINE

## 2023-12-04 PROCEDURE — 93296 REM INTERROG EVL PM/IDS: CPT

## 2023-12-04 NOTE — PROGRESS NOTES
Subjective   Reason for Visit: Marnie Jha is an 68 y.o. male here for a Medicare Wellness visit.               HPI    Patient Care Team:  Tre Benavidez DO as PCP - General (Internal Medicine)  Bassem Méndez MD as Consulting Physician (Cardiology)     Review of Systems    Objective   Vitals:  There were no vitals taken for this visit.      Physical Exam    Assessment/Plan   Problem List Items Addressed This Visit    None

## 2023-12-05 ENCOUNTER — TELEPHONE (OUTPATIENT)
Dept: GASTROENTEROLOGY | Facility: CLINIC | Age: 68
End: 2023-12-05

## 2023-12-11 DIAGNOSIS — F41.9 ANXIETY: ICD-10-CM

## 2023-12-11 RX ORDER — CLONAZEPAM 1 MG/1
1 TABLET ORAL 2 TIMES DAILY
Qty: 60 TABLET | Refills: 2 | Status: SHIPPED | OUTPATIENT
Start: 2023-12-11 | End: 2024-03-04 | Stop reason: SDUPTHER

## 2023-12-11 NOTE — TELEPHONE ENCOUNTER
Pt wife states he should be on Klonopin 1mg- I don't see anything in the chart. Can you verify with OARRS?

## 2023-12-21 ENCOUNTER — HOSPITAL ENCOUNTER (OUTPATIENT)
Dept: CARDIOLOGY | Facility: HOSPITAL | Age: 68
Discharge: HOME | End: 2023-12-21
Payer: MEDICARE

## 2023-12-21 PROCEDURE — 93005 ELECTROCARDIOGRAM TRACING: CPT

## 2023-12-29 LAB
ATRIAL RATE: 71 BPM
ATRIAL RATE: 74 BPM
P AXIS: -6 DEGREES
P OFFSET: 184 MS
P OFFSET: 184 MS
P ONSET: 140 MS
P ONSET: 141 MS
PR INTERVAL: 196 MS
PR INTERVAL: 200 MS
Q ONSET: 216 MS
Q ONSET: 217 MS
QRS COUNT: 11 BEATS
QRS COUNT: 12 BEATS
QRS DURATION: 90 MS
QRS DURATION: 92 MS
QT INTERVAL: 406 MS
QT INTERVAL: 422 MS
QTC CALCULATION(BAZETT): 450 MS
QTC CALCULATION(BAZETT): 458 MS
QTC FREDERICIA: 435 MS
QTC FREDERICIA: 446 MS
R AXIS: -9 DEGREES
R AXIS: 3 DEGREES
T AXIS: 65 DEGREES
T AXIS: 87 DEGREES
T OFFSET: 419 MS
T OFFSET: 428 MS
VENTRICULAR RATE: 71 BPM
VENTRICULAR RATE: 74 BPM

## 2024-01-02 ENCOUNTER — TELEPHONE (OUTPATIENT)
Dept: CARDIOLOGY | Facility: CLINIC | Age: 69
End: 2024-01-02
Payer: MEDICARE

## 2024-01-02 NOTE — TELEPHONE ENCOUNTER
Patient is stating he got an alert that something is wrong with his pacemaker. Is there something he should do?

## 2024-01-04 ENCOUNTER — HOSPITAL ENCOUNTER (OUTPATIENT)
Dept: CARDIOLOGY | Facility: CLINIC | Age: 69
Discharge: HOME | End: 2024-01-04
Payer: MEDICARE

## 2024-01-04 DIAGNOSIS — I49.5 SICK SINUS SYNDROME (MULTI): ICD-10-CM

## 2024-01-04 DIAGNOSIS — Z95.0 CARDIAC PACEMAKER IN SITU: ICD-10-CM

## 2024-01-09 ENCOUNTER — OFFICE VISIT (OUTPATIENT)
Dept: PRIMARY CARE | Facility: CLINIC | Age: 69
End: 2024-01-09
Payer: MEDICARE

## 2024-01-09 VITALS
SYSTOLIC BLOOD PRESSURE: 108 MMHG | DIASTOLIC BLOOD PRESSURE: 62 MMHG | WEIGHT: 190 LBS | BODY MASS INDEX: 32.61 KG/M2 | OXYGEN SATURATION: 98 % | HEART RATE: 79 BPM

## 2024-01-09 DIAGNOSIS — F32.A DEPRESSION, UNSPECIFIED DEPRESSION TYPE: Primary | ICD-10-CM

## 2024-01-09 PROCEDURE — 3008F BODY MASS INDEX DOCD: CPT | Performed by: STUDENT IN AN ORGANIZED HEALTH CARE EDUCATION/TRAINING PROGRAM

## 2024-01-09 PROCEDURE — 3074F SYST BP LT 130 MM HG: CPT | Performed by: STUDENT IN AN ORGANIZED HEALTH CARE EDUCATION/TRAINING PROGRAM

## 2024-01-09 PROCEDURE — 1125F AMNT PAIN NOTED PAIN PRSNT: CPT | Performed by: STUDENT IN AN ORGANIZED HEALTH CARE EDUCATION/TRAINING PROGRAM

## 2024-01-09 PROCEDURE — 99214 OFFICE O/P EST MOD 30 MIN: CPT | Performed by: STUDENT IN AN ORGANIZED HEALTH CARE EDUCATION/TRAINING PROGRAM

## 2024-01-09 PROCEDURE — 1036F TOBACCO NON-USER: CPT | Performed by: STUDENT IN AN ORGANIZED HEALTH CARE EDUCATION/TRAINING PROGRAM

## 2024-01-09 PROCEDURE — 1159F MED LIST DOCD IN RCRD: CPT | Performed by: STUDENT IN AN ORGANIZED HEALTH CARE EDUCATION/TRAINING PROGRAM

## 2024-01-09 PROCEDURE — 3078F DIAST BP <80 MM HG: CPT | Performed by: STUDENT IN AN ORGANIZED HEALTH CARE EDUCATION/TRAINING PROGRAM

## 2024-01-09 RX ORDER — BUPROPION HYDROCHLORIDE 150 MG/1
150 TABLET, EXTENDED RELEASE ORAL 2 TIMES DAILY
Qty: 60 TABLET | Refills: 3 | Status: SHIPPED
Start: 2024-01-09 | End: 2024-05-03 | Stop reason: WASHOUT

## 2024-01-09 NOTE — PROGRESS NOTES
Subjective   Patient ID: Marnie Jha is a 68 y.o. male who presents for Depression (Antidepressants /Says he is over obsessing about something ).    HPI     Depression:  lots of regrets in past life taht is weighing on him, especially thoughts of kids. Regretts he never had thme and watchign family with kids as well as holidays, doesn't wan t to hurt himself but these thoughts have been increasing more and more. Worreid about this health, but he is doing well. Has seen counselor in the past as well    Review of Systems   All other systems reviewed and are negative.      Objective   /62 (BP Location: Left arm, Patient Position: Sitting, BP Cuff Size: Large adult)   Pulse 79   Wt 86.2 kg (190 lb)   SpO2 98%   BMI 32.61 kg/m²     Physical Exam  Constitutional:       Appearance: Normal appearance.   HENT:      Head: Normocephalic and atraumatic.      Right Ear: Tympanic membrane and ear canal normal.      Left Ear: Tympanic membrane and ear canal normal.      Mouth/Throat:      Mouth: Mucous membranes are moist.      Pharynx: Oropharynx is clear.   Eyes:      Extraocular Movements: Extraocular movements intact.      Conjunctiva/sclera: Conjunctivae normal.      Pupils: Pupils are equal, round, and reactive to light.   Cardiovascular:      Rate and Rhythm: Normal rate and regular rhythm.      Pulses: Normal pulses.      Heart sounds: Normal heart sounds.   Pulmonary:      Effort: Pulmonary effort is normal.      Breath sounds: Normal breath sounds.   Abdominal:      General: Abdomen is flat. Bowel sounds are normal.      Palpations: Abdomen is soft.   Musculoskeletal:         General: Normal range of motion.      Cervical back: Normal range of motion and neck supple.   Skin:     General: Skin is warm and dry.      Capillary Refill: Capillary refill takes 2 to 3 seconds.   Neurological:      General: No focal deficit present.      Mental Status: He is alert and oriented to person, place, and time. Mental  status is at baseline.   Psychiatric:         Mood and Affect: Mood normal.         Behavior: Behavior normal.         Thought Content: Thought content normal.         Judgment: Judgment normal.       Assessment/Plan   1. Depression, unspecified depression type  - doing well, no SI, or HI  - Referral to Psychiatry; Future  - buPROPion SR (Wellbutrin SR) 150 mg 12 hr tablet; Take 1 tablet (150 mg) by mouth 2 times a day. Do not crush, chew, or split.  Dispense: 60 tablet; Refill: 3

## 2024-01-22 DIAGNOSIS — C18.9 COLON ADENOCARCINOMA (MULTI): Primary | ICD-10-CM

## 2024-02-06 ENCOUNTER — LAB (OUTPATIENT)
Dept: LAB | Facility: CLINIC | Age: 69
End: 2024-02-06
Payer: MEDICARE

## 2024-02-06 ENCOUNTER — APPOINTMENT (OUTPATIENT)
Dept: LAB | Facility: CLINIC | Age: 69
End: 2024-02-06
Payer: MEDICARE

## 2024-02-06 DIAGNOSIS — C18.9 COLON ADENOCARCINOMA (MULTI): ICD-10-CM

## 2024-02-06 LAB
ALBUMIN SERPL BCP-MCNC: 4.4 G/DL (ref 3.4–5)
ALP SERPL-CCNC: 70 U/L (ref 33–136)
ALT SERPL W P-5'-P-CCNC: 21 U/L (ref 10–52)
ANION GAP SERPL CALC-SCNC: 15 MMOL/L (ref 10–20)
AST SERPL W P-5'-P-CCNC: 17 U/L (ref 9–39)
BASOPHILS # BLD AUTO: 0.05 X10*3/UL (ref 0–0.1)
BASOPHILS NFR BLD AUTO: 0.6 %
BILIRUB SERPL-MCNC: 0.6 MG/DL (ref 0–1.2)
BUN SERPL-MCNC: 21 MG/DL (ref 6–23)
CALCIUM SERPL-MCNC: 8.9 MG/DL (ref 8.6–10.3)
CEA SERPL-MCNC: 1.3 UG/L
CHLORIDE SERPL-SCNC: 103 MMOL/L (ref 98–107)
CO2 SERPL-SCNC: 23 MMOL/L (ref 21–32)
CREAT SERPL-MCNC: 1.11 MG/DL (ref 0.5–1.3)
EGFRCR SERPLBLD CKD-EPI 2021: 73 ML/MIN/1.73M*2
EOSINOPHIL # BLD AUTO: 0.28 X10*3/UL (ref 0–0.7)
EOSINOPHIL NFR BLD AUTO: 3.3 %
ERYTHROCYTE [DISTWIDTH] IN BLOOD BY AUTOMATED COUNT: 13.2 % (ref 11.5–14.5)
GLUCOSE SERPL-MCNC: 98 MG/DL (ref 74–99)
HCT VFR BLD AUTO: 43 % (ref 41–52)
HGB BLD-MCNC: 15.3 G/DL (ref 13.5–17.5)
IMM GRANULOCYTES # BLD AUTO: 0.02 X10*3/UL (ref 0–0.7)
IMM GRANULOCYTES NFR BLD AUTO: 0.2 % (ref 0–0.9)
LYMPHOCYTES # BLD AUTO: 2.6 X10*3/UL (ref 1.2–4.8)
LYMPHOCYTES NFR BLD AUTO: 30.4 %
MCH RBC QN AUTO: 32.3 PG (ref 26–34)
MCHC RBC AUTO-ENTMCNC: 35.6 G/DL (ref 32–36)
MCV RBC AUTO: 91 FL (ref 80–100)
MONOCYTES # BLD AUTO: 0.63 X10*3/UL (ref 0.1–1)
MONOCYTES NFR BLD AUTO: 7.4 %
NEUTROPHILS # BLD AUTO: 4.97 X10*3/UL (ref 1.2–7.7)
NEUTROPHILS NFR BLD AUTO: 58.1 %
NRBC BLD-RTO: 0 /100 WBCS (ref 0–0)
PLATELET # BLD AUTO: 200 X10*3/UL (ref 150–450)
POTASSIUM SERPL-SCNC: 3.9 MMOL/L (ref 3.5–5.3)
PROT SERPL-MCNC: 6.7 G/DL (ref 6.4–8.2)
RBC # BLD AUTO: 4.73 X10*6/UL (ref 4.5–5.9)
SODIUM SERPL-SCNC: 137 MMOL/L (ref 136–145)
WBC # BLD AUTO: 8.6 X10*3/UL (ref 4.4–11.3)

## 2024-02-06 PROCEDURE — 82378 CARCINOEMBRYONIC ANTIGEN: CPT | Mod: PARLAB

## 2024-02-06 PROCEDURE — 80053 COMPREHEN METABOLIC PANEL: CPT

## 2024-02-06 PROCEDURE — 85025 COMPLETE CBC W/AUTO DIFF WBC: CPT

## 2024-02-06 PROCEDURE — 36415 COLL VENOUS BLD VENIPUNCTURE: CPT

## 2024-02-07 ENCOUNTER — OFFICE VISIT (OUTPATIENT)
Dept: HEMATOLOGY/ONCOLOGY | Facility: CLINIC | Age: 69
End: 2024-02-07
Payer: MEDICARE

## 2024-02-07 VITALS
SYSTOLIC BLOOD PRESSURE: 115 MMHG | RESPIRATION RATE: 20 BRPM | OXYGEN SATURATION: 96 % | WEIGHT: 190.48 LBS | DIASTOLIC BLOOD PRESSURE: 72 MMHG | BODY MASS INDEX: 32.7 KG/M2 | HEART RATE: 74 BPM | TEMPERATURE: 98.8 F

## 2024-02-07 DIAGNOSIS — D3A.8: ICD-10-CM

## 2024-02-07 DIAGNOSIS — C18.9 COLON ADENOCARCINOMA (MULTI): Primary | ICD-10-CM

## 2024-02-07 PROBLEM — G62.9 NEUROPATHY: Status: ACTIVE | Noted: 2024-02-07

## 2024-02-07 PROBLEM — I71.20 THORACIC AORTIC ANEURYSM, WITHOUT RUPTURE, UNSPECIFIED (CMS-HCC): Status: ACTIVE | Noted: 2023-07-25

## 2024-02-07 PROBLEM — F32.A DEPRESSIVE DISORDER: Status: ACTIVE | Noted: 2024-02-07

## 2024-02-07 PROBLEM — R16.1 SPLENOMEGALY: Status: ACTIVE | Noted: 2024-02-07

## 2024-02-07 PROBLEM — R12 HEARTBURN: Status: ACTIVE | Noted: 2024-02-07

## 2024-02-07 PROBLEM — Z86.010 HISTORY OF COLONIC POLYPS: Status: ACTIVE | Noted: 2024-02-07

## 2024-02-07 PROBLEM — K52.9 SECRETORY DIARRHEA: Status: ACTIVE | Noted: 2024-02-07

## 2024-02-07 PROBLEM — Z86.0100 HISTORY OF COLONIC POLYPS: Status: ACTIVE | Noted: 2024-02-07

## 2024-02-07 PROBLEM — R42 DIZZINESS: Status: ACTIVE | Noted: 2024-02-07

## 2024-02-07 PROBLEM — U07.1 DISEASE DUE TO SEVERE ACUTE RESPIRATORY SYNDROME CORONAVIRUS 2 (SARS-COV-2): Status: ACTIVE | Noted: 2024-02-07

## 2024-02-07 PROBLEM — C18.7 MALIGNANT NEOPLASM OF SIGMOID COLON (MULTI): Status: ACTIVE | Noted: 2022-12-06

## 2024-02-07 PROCEDURE — 3008F BODY MASS INDEX DOCD: CPT | Performed by: INTERNAL MEDICINE

## 2024-02-07 PROCEDURE — 99214 OFFICE O/P EST MOD 30 MIN: CPT | Performed by: INTERNAL MEDICINE

## 2024-02-07 PROCEDURE — 1036F TOBACCO NON-USER: CPT | Performed by: INTERNAL MEDICINE

## 2024-02-07 PROCEDURE — 3078F DIAST BP <80 MM HG: CPT | Performed by: INTERNAL MEDICINE

## 2024-02-07 PROCEDURE — 3074F SYST BP LT 130 MM HG: CPT | Performed by: INTERNAL MEDICINE

## 2024-02-07 PROCEDURE — 1126F AMNT PAIN NOTED NONE PRSNT: CPT | Performed by: INTERNAL MEDICINE

## 2024-02-07 PROCEDURE — 1159F MED LIST DOCD IN RCRD: CPT | Performed by: INTERNAL MEDICINE

## 2024-02-07 ASSESSMENT — PATIENT HEALTH QUESTIONNAIRE - PHQ9
SUM OF ALL RESPONSES TO PHQ9 QUESTIONS 1 AND 2: 0
2. FEELING DOWN, DEPRESSED OR HOPELESS: NOT AT ALL
1. LITTLE INTEREST OR PLEASURE IN DOING THINGS: NOT AT ALL

## 2024-02-07 ASSESSMENT — PAIN SCALES - GENERAL: PAINLEVEL: 0-NO PAIN

## 2024-02-20 ENCOUNTER — TELEPHONE (OUTPATIENT)
Dept: PRIMARY CARE | Facility: CLINIC | Age: 69
End: 2024-02-20
Payer: MEDICARE

## 2024-02-20 DIAGNOSIS — R12 HEARTBURN: ICD-10-CM

## 2024-02-20 DIAGNOSIS — Z12.11 SPECIAL SCREENING FOR MALIGNANT NEOPLASM OF COLON: ICD-10-CM

## 2024-02-20 NOTE — TELEPHONE ENCOUNTER
Wanted to know if you could put in a referral for a colonoscopy said he missed his appointment and haven't had one in a while and is afraid that he's waited to long.he also is requesting a EGD referral he also missed that appointment He requested to have one at [facility]

## 2024-02-21 ENCOUNTER — TELEPHONE (OUTPATIENT)
Dept: GASTROENTEROLOGY | Facility: CLINIC | Age: 69
End: 2024-02-21
Payer: MEDICARE

## 2024-02-26 DIAGNOSIS — Z12.11 COLON CANCER SCREENING: Primary | ICD-10-CM

## 2024-02-26 RX ORDER — SOD SULF/POT CHLORIDE/MAG SULF 1.479 G
12 TABLET ORAL 2 TIMES DAILY
Qty: 24 TABLET | Refills: 0 | Status: SHIPPED | OUTPATIENT
Start: 2024-02-26 | End: 2024-02-27

## 2024-02-26 NOTE — TELEPHONE ENCOUNTER
Patient is asking for Sutab. Antoine gave him one last time he had it and wanted another given to him, to which I explained we do not have samples anymore but I would request a RX for Sutab to be sent to his Pharmacy. Patients wife said any liquid prep puts him in the hospital.

## 2024-02-26 NOTE — TELEPHONE ENCOUNTER
Called to schedule patient after patient called 3x in 1/2 day, got answering machine that disconnected me, called again and spoke with spouse who scheduled appointment.  Patient stated he had been calling for weeks trying to schedule but I only received 1 message before today and that was on Wednesday the 21st, ( the same day the order was written) in the afternoon. I was out ill the next 2 days and came back in today. He is schedule for first available.

## 2024-02-26 NOTE — TELEPHONE ENCOUNTER
Result Communication    No results found from the In Basket message.    1:37 PM      Results {WERE / WERE NOT:91816} successfully communicated with the {RHEUM PARENT/PATIENT:69428} and they {AMB Acknowledged/Did Not Acknowledge:34674} their understanding.

## 2024-02-26 NOTE — TELEPHONE ENCOUNTER
Patient called and is frusterated that he has called numerous times to have his EGD and Colonoscopy scheduled.  Please call patient to schedule.  Thank you.

## 2024-02-27 ENCOUNTER — APPOINTMENT (OUTPATIENT)
Dept: BEHAVIORAL HEALTH | Facility: CLINIC | Age: 69
End: 2024-02-27
Payer: MEDICARE

## 2024-03-04 ENCOUNTER — OFFICE VISIT (OUTPATIENT)
Dept: PRIMARY CARE | Facility: CLINIC | Age: 69
End: 2024-03-04
Payer: MEDICARE

## 2024-03-04 VITALS
BODY MASS INDEX: 32.27 KG/M2 | SYSTOLIC BLOOD PRESSURE: 126 MMHG | OXYGEN SATURATION: 95 % | WEIGHT: 188 LBS | HEART RATE: 78 BPM | DIASTOLIC BLOOD PRESSURE: 88 MMHG

## 2024-03-04 DIAGNOSIS — G47.33 OSA (OBSTRUCTIVE SLEEP APNEA): Primary | ICD-10-CM

## 2024-03-04 DIAGNOSIS — Z79.899 CONTROLLED SUBSTANCE AGREEMENT SIGNED: ICD-10-CM

## 2024-03-04 DIAGNOSIS — F41.9 ANXIETY: ICD-10-CM

## 2024-03-04 LAB
AMPHETAMINES UR QL SCN: NORMAL
BARBITURATES UR QL SCN: NORMAL
BZE UR QL SCN: NORMAL
CANNABINOIDS UR QL SCN: NORMAL
CREAT UR-MCNC: 180.6 MG/DL (ref 20–370)
PCP UR QL SCN: NORMAL

## 2024-03-04 PROCEDURE — 82570 ASSAY OF URINE CREATININE: CPT

## 2024-03-04 PROCEDURE — 80354 DRUG SCREENING FENTANYL: CPT

## 2024-03-04 PROCEDURE — 1159F MED LIST DOCD IN RCRD: CPT | Performed by: STUDENT IN AN ORGANIZED HEALTH CARE EDUCATION/TRAINING PROGRAM

## 2024-03-04 PROCEDURE — 99214 OFFICE O/P EST MOD 30 MIN: CPT | Performed by: STUDENT IN AN ORGANIZED HEALTH CARE EDUCATION/TRAINING PROGRAM

## 2024-03-04 PROCEDURE — 1036F TOBACCO NON-USER: CPT | Performed by: STUDENT IN AN ORGANIZED HEALTH CARE EDUCATION/TRAINING PROGRAM

## 2024-03-04 PROCEDURE — 80365 DRUG SCREENING OXYCODONE: CPT

## 2024-03-04 PROCEDURE — 80307 DRUG TEST PRSMV CHEM ANLYZR: CPT

## 2024-03-04 PROCEDURE — 1126F AMNT PAIN NOTED NONE PRSNT: CPT | Performed by: STUDENT IN AN ORGANIZED HEALTH CARE EDUCATION/TRAINING PROGRAM

## 2024-03-04 PROCEDURE — 3079F DIAST BP 80-89 MM HG: CPT | Performed by: STUDENT IN AN ORGANIZED HEALTH CARE EDUCATION/TRAINING PROGRAM

## 2024-03-04 PROCEDURE — 80361 OPIATES 1 OR MORE: CPT

## 2024-03-04 PROCEDURE — 3074F SYST BP LT 130 MM HG: CPT | Performed by: STUDENT IN AN ORGANIZED HEALTH CARE EDUCATION/TRAINING PROGRAM

## 2024-03-04 PROCEDURE — 3008F BODY MASS INDEX DOCD: CPT | Performed by: STUDENT IN AN ORGANIZED HEALTH CARE EDUCATION/TRAINING PROGRAM

## 2024-03-04 PROCEDURE — 80346 BENZODIAZEPINES1-12: CPT

## 2024-03-04 PROCEDURE — 80373 DRUG SCREENING TRAMADOL: CPT

## 2024-03-04 PROCEDURE — 80358 DRUG SCREENING METHADONE: CPT

## 2024-03-04 PROCEDURE — 80368 SEDATIVE HYPNOTICS: CPT

## 2024-03-04 RX ORDER — CLONAZEPAM 1 MG/1
1 TABLET ORAL 2 TIMES DAILY
Qty: 60 TABLET | Refills: 2 | Status: SHIPPED | OUTPATIENT
Start: 2024-03-04

## 2024-03-04 RX ORDER — CLONAZEPAM 1 MG/1
1 TABLET ORAL 2 TIMES DAILY
Qty: 60 TABLET | Refills: 2 | Status: CANCELLED | OUTPATIENT
Start: 2024-03-04

## 2024-03-04 ASSESSMENT — PATIENT HEALTH QUESTIONNAIRE - PHQ9
2. FEELING DOWN, DEPRESSED OR HOPELESS: MORE THAN HALF THE DAYS
SUM OF ALL RESPONSES TO PHQ9 QUESTIONS 1 AND 2: 3
1. LITTLE INTEREST OR PLEASURE IN DOING THINGS: SEVERAL DAYS

## 2024-03-04 NOTE — PROGRESS NOTES
Subjective   Patient ID: Marnie Jha is a 68 y.o. male who presents for Follow-up (Depression ).    HPI     CABG: follows cardiology, stable no chest pain, complicated with palpations and pm     Colon cancer: in remission, follows oncology hx of surgery, and chemo, in remission     HTN: stable on BP meds     Anxiety: was given  old pcp has been on  it for 15-20 years, tried to get off by he couldn't so went back on it. Doing well has gotten his refills will need new rx    Samantha: excessive daytime sleepiness, has samantha, has cpap but does not use it, enoragd to use it    OARRS:  No data recorded  I have personally reviewed the OARRS report for Marnie Jha. I have considered the risks of abuse, dependence, addiction and diversion    Is the patient prescribed a combination of a benzodiazepine and opioid?  No    Last Urine Drug Screen / ordered today:yes  Recent Results (from the past 8760 hour(s))   Drug Screen, Urine With Reflex to Confirmation    Collection Time: 12/01/23 11:58 AM   Result Value Ref Range    Amphetamine Screen, Urine Presumptive Negative Presumptive Negative    Barbiturate Screen, Urine Presumptive Negative Presumptive Negative    Benzodiazepines Screen, Urine Presumptive Negative Presumptive Negative    Cannabinoid Screen, Urine Presumptive Negative Presumptive Negative    Cocaine Metabolite Screen, Urine Presumptive Negative Presumptive Negative    Fentanyl Screen, Urine Presumptive Negative Presumptive Negative    Opiate Screen, Urine Presumptive Negative Presumptive Negative    Oxycodone Screen, Urine Presumptive Negative Presumptive Negative    PCP Screen, Urine Presumptive Negative Presumptive Negative     Results are as expected.         Controlled Substance Agreement:  Date of the Last Agreement: 12/1  Reviewed Controlled Substance Agreement including but not limited to the benefits, risks, and alternatives to treatment with a Controlled Substance medication(s).    Benzodiazepines:  What  is the patient's goal of therapy? anxieyt  Is this being achieved with current treatment? yes    MARIO-7:  No data recorded    Activities of Daily Living:   Is your overall impression that this patient is benefiting (symptom reduction outweighs side effects) from benzodiazepine therapy? Yes     1. Physical Functioning: Better  2. Family Relationship: Better  3. Social Relationship: Better  4. Mood: Better  5. Sleep Patterns: Better  6. Overall Function: Better    Review of Systems   All other systems reviewed and are negative.      Objective   /88 (BP Location: Left arm, Patient Position: Sitting, BP Cuff Size: Small adult)   Pulse 78   Wt 85.3 kg (188 lb)   SpO2 95%   BMI 32.27 kg/m²     Physical Exam  Constitutional:       Appearance: Normal appearance.   HENT:      Head: Normocephalic and atraumatic.      Right Ear: Tympanic membrane and ear canal normal.      Left Ear: Tympanic membrane and ear canal normal.      Mouth/Throat:      Mouth: Mucous membranes are moist.      Pharynx: Oropharynx is clear.   Eyes:      Extraocular Movements: Extraocular movements intact.      Conjunctiva/sclera: Conjunctivae normal.      Pupils: Pupils are equal, round, and reactive to light.   Cardiovascular:      Rate and Rhythm: Normal rate and regular rhythm.      Pulses: Normal pulses.      Heart sounds: Normal heart sounds.   Pulmonary:      Effort: Pulmonary effort is normal.      Breath sounds: Normal breath sounds.   Abdominal:      General: Abdomen is flat. Bowel sounds are normal.      Palpations: Abdomen is soft.   Musculoskeletal:         General: Normal range of motion.      Cervical back: Normal range of motion and neck supple.   Skin:     General: Skin is warm and dry.      Capillary Refill: Capillary refill takes 2 to 3 seconds.   Neurological:      General: No focal deficit present.      Mental Status: He is alert and oriented to person, place, and time. Mental status is at baseline.   Psychiatric:          Mood and Affect: Mood normal.         Behavior: Behavior normal.         Thought Content: Thought content normal.         Judgment: Judgment normal.       Assessment/Plan     1. Anxiety  Refills  UDS today to evaluate comliance  - clonazePAM (KlonoPIN) 1 mg tablet; Take 1 tablet (1 mg) by mouth 2 times a day.  Dispense: 60 tablet; Refill: 2    2. ROBERT (obstructive sleep apnea)  Encouraged CPAP usuage  Unable to move forward with test unless he is using hsi cpap  Lot of his sleeping issues seems to be related to this    3. Controlled substance agreement signed    - Opiate/Opioid/Benzo Prescription Compliance  - OOB Internal Tracking

## 2024-03-08 LAB
1OH-MIDAZOLAM UR CFM-MCNC: <25 NG/ML
6MAM UR CFM-MCNC: <25 NG/ML
7AMINOCLONAZEPAM UR CFM-MCNC: >1000 NG/ML
A-OH ALPRAZ UR CFM-MCNC: <25 NG/ML
ALPRAZ UR CFM-MCNC: <25 NG/ML
CHLORDIAZEP UR CFM-MCNC: <25 NG/ML
CLONAZEPAM UR CFM-MCNC: <25 NG/ML
CODEINE UR CFM-MCNC: <50 NG/ML
DIAZEPAM UR CFM-MCNC: <25 NG/ML
EDDP UR CFM-MCNC: <25 NG/ML
FENTANYL UR CFM-MCNC: <2.5 NG/ML
HYDROCODONE CTO UR CFM-MCNC: <25 NG/ML
HYDROMORPHONE UR CFM-MCNC: <25 NG/ML
LORAZEPAM UR CFM-MCNC: <25 NG/ML
METHADONE UR CFM-MCNC: <25 NG/ML
MIDAZOLAM UR CFM-MCNC: <25 NG/ML
MORPHINE UR CFM-MCNC: <50 NG/ML
NORDIAZEPAM UR CFM-MCNC: <25 NG/ML
NORFENTANYL UR CFM-MCNC: <2.5 NG/ML
NORHYDROCODONE UR CFM-MCNC: <25 NG/ML
NOROXYCODONE UR CFM-MCNC: <25 NG/ML
NORTRAMADOL UR-MCNC: <50 NG/ML
OXAZEPAM UR CFM-MCNC: <25 NG/ML
OXYCODONE UR CFM-MCNC: <25 NG/ML
OXYMORPHONE UR CFM-MCNC: <25 NG/ML
TEMAZEPAM UR CFM-MCNC: <25 NG/ML
TRAMADOL UR CFM-MCNC: <50 NG/ML
ZOLPIDEM UR CFM-MCNC: <25 NG/ML
ZOLPIDEM UR-MCNC: <25 NG/ML

## 2024-03-27 ENCOUNTER — NURSE TRIAGE (OUTPATIENT)
Dept: PRIMARY CARE | Facility: CLINIC | Age: 69
End: 2024-03-27
Payer: MEDICARE

## 2024-04-09 ENCOUNTER — TELEPHONE (OUTPATIENT)
Dept: PRIMARY CARE | Facility: CLINIC | Age: 69
End: 2024-04-09
Payer: MEDICARE

## 2024-04-09 NOTE — TELEPHONE ENCOUNTER
Pt. Is scheduled for a colonoscopy and can not take the liquid solution (go litely) for prep so what other options does he have to prepare for the colonoscopy that are not costly, procedure scheduled for 5/6/2024.

## 2024-04-30 ENCOUNTER — TELEPHONE (OUTPATIENT)
Dept: GASTROENTEROLOGY | Facility: CLINIC | Age: 69
End: 2024-04-30
Payer: MEDICARE

## 2024-04-30 DIAGNOSIS — Z12.11 COLON CANCER SCREENING: Primary | ICD-10-CM

## 2024-04-30 RX ORDER — SODIUM, POTASSIUM,MAG SULFATES 17.5-3.13G
SOLUTION, RECONSTITUTED, ORAL ORAL
Qty: 354 ML | Refills: 0 | Status: SHIPPED
Start: 2024-04-30 | End: 2024-05-06 | Stop reason: ALTCHOICE

## 2024-04-30 NOTE — TELEPHONE ENCOUNTER
Patient called and is requesting information regarding prep - patient would like a call back to discuss procedure / preparation questions.

## 2024-05-06 ENCOUNTER — ANESTHESIA EVENT (OUTPATIENT)
Dept: GASTROENTEROLOGY | Facility: HOSPITAL | Age: 69
End: 2024-05-06
Payer: MEDICARE

## 2024-05-06 ENCOUNTER — HOSPITAL ENCOUNTER (OUTPATIENT)
Dept: GASTROENTEROLOGY | Facility: HOSPITAL | Age: 69
Discharge: HOME | End: 2024-05-06
Payer: MEDICARE

## 2024-05-06 ENCOUNTER — ANESTHESIA (OUTPATIENT)
Dept: GASTROENTEROLOGY | Facility: HOSPITAL | Age: 69
End: 2024-05-06
Payer: MEDICARE

## 2024-05-06 VITALS
DIASTOLIC BLOOD PRESSURE: 82 MMHG | OXYGEN SATURATION: 96 % | BODY MASS INDEX: 32.11 KG/M2 | RESPIRATION RATE: 20 BRPM | TEMPERATURE: 97.3 F | HEART RATE: 76 BPM | WEIGHT: 188.05 LBS | HEIGHT: 64 IN | SYSTOLIC BLOOD PRESSURE: 127 MMHG

## 2024-05-06 DIAGNOSIS — R12 HEARTBURN: ICD-10-CM

## 2024-05-06 DIAGNOSIS — Z12.11 SPECIAL SCREENING FOR MALIGNANT NEOPLASM OF COLON: ICD-10-CM

## 2024-05-06 DIAGNOSIS — C18.9 COLON ADENOCARCINOMA (MULTI): ICD-10-CM

## 2024-05-06 PROCEDURE — A45380 PR COLONOSCOPY,BIOPSY: Performed by: ANESTHESIOLOGY

## 2024-05-06 PROCEDURE — 45385 COLONOSCOPY W/LESION REMOVAL: CPT | Performed by: INTERNAL MEDICINE

## 2024-05-06 PROCEDURE — 2500000004 HC RX 250 GENERAL PHARMACY W/ HCPCS (ALT 636 FOR OP/ED): Performed by: ANESTHESIOLOGIST ASSISTANT

## 2024-05-06 PROCEDURE — 3700000002 HC GENERAL ANESTHESIA TIME - EACH INCREMENTAL 1 MINUTE

## 2024-05-06 PROCEDURE — 3700000001 HC GENERAL ANESTHESIA TIME - INITIAL BASE CHARGE

## 2024-05-06 PROCEDURE — 7100000010 HC PHASE TWO TIME - EACH INCREMENTAL 1 MINUTE

## 2024-05-06 PROCEDURE — 88305 TISSUE EXAM BY PATHOLOGIST: CPT | Performed by: PATHOLOGY

## 2024-05-06 PROCEDURE — 7100000009 HC PHASE TWO TIME - INITIAL BASE CHARGE

## 2024-05-06 PROCEDURE — 0753T DGTZ GLS MCRSCP SLD LEVEL IV: CPT | Mod: TC,PARLAB | Performed by: INTERNAL MEDICINE

## 2024-05-06 PROCEDURE — 43239 EGD BIOPSY SINGLE/MULTIPLE: CPT | Performed by: INTERNAL MEDICINE

## 2024-05-06 PROCEDURE — 2500000005 HC RX 250 GENERAL PHARMACY W/O HCPCS: Performed by: ANESTHESIOLOGIST ASSISTANT

## 2024-05-06 PROCEDURE — A45380 PR COLONOSCOPY,BIOPSY: Performed by: ANESTHESIOLOGIST ASSISTANT

## 2024-05-06 RX ORDER — SODIUM CHLORIDE, SODIUM LACTATE, POTASSIUM CHLORIDE, CALCIUM CHLORIDE 600; 310; 30; 20 MG/100ML; MG/100ML; MG/100ML; MG/100ML
20 INJECTION, SOLUTION INTRAVENOUS CONTINUOUS
Status: DISCONTINUED | OUTPATIENT
Start: 2024-05-06 | End: 2024-05-07 | Stop reason: HOSPADM

## 2024-05-06 RX ORDER — LIDOCAINE HYDROCHLORIDE 20 MG/ML
INJECTION, SOLUTION INFILTRATION; PERINEURAL AS NEEDED
Status: DISCONTINUED | OUTPATIENT
Start: 2024-05-06 | End: 2024-05-06

## 2024-05-06 RX ORDER — PROPOFOL 10 MG/ML
INJECTION, EMULSION INTRAVENOUS AS NEEDED
Status: DISCONTINUED | OUTPATIENT
Start: 2024-05-06 | End: 2024-05-06

## 2024-05-06 RX ADMIN — PROPOFOL 0.82 MG: 10 INJECTION, EMULSION INTRAVENOUS at 13:14

## 2024-05-06 RX ADMIN — PROPOFOL 7.4 MG: 10 INJECTION, EMULSION INTRAVENOUS at 13:13

## 2024-05-06 RX ADMIN — LIDOCAINE HYDROCHLORIDE 50 MG: 20 INJECTION, SOLUTION INFILTRATION; PERINEURAL at 13:07

## 2024-05-06 RX ADMIN — PROPOFOL 50 MG: 10 INJECTION, EMULSION INTRAVENOUS at 13:17

## 2024-05-06 RX ADMIN — PROPOFOL 20 MG: 10 INJECTION, EMULSION INTRAVENOUS at 13:30

## 2024-05-06 RX ADMIN — PROPOFOL 100 MG: 10 INJECTION, EMULSION INTRAVENOUS at 13:07

## 2024-05-06 RX ADMIN — PROPOFOL 50 MG: 10 INJECTION, EMULSION INTRAVENOUS at 13:23

## 2024-05-06 RX ADMIN — PROPOFOL 100 MCG/KG/MIN: 10 INJECTION, EMULSION INTRAVENOUS at 13:08

## 2024-05-06 RX ADMIN — PROPOFOL 50 MG: 10 INJECTION, EMULSION INTRAVENOUS at 13:25

## 2024-05-06 SDOH — HEALTH STABILITY: MENTAL HEALTH: CURRENT SMOKER: 0

## 2024-05-06 ASSESSMENT — PAIN SCALES - GENERAL
PAINLEVEL_OUTOF10: 0 - NO PAIN

## 2024-05-06 ASSESSMENT — COLUMBIA-SUICIDE SEVERITY RATING SCALE - C-SSRS
6. HAVE YOU EVER DONE ANYTHING, STARTED TO DO ANYTHING, OR PREPARED TO DO ANYTHING TO END YOUR LIFE?: NO
1. IN THE PAST MONTH, HAVE YOU WISHED YOU WERE DEAD OR WISHED YOU COULD GO TO SLEEP AND NOT WAKE UP?: NO
2. HAVE YOU ACTUALLY HAD ANY THOUGHTS OF KILLING YOURSELF?: NO

## 2024-05-06 ASSESSMENT — PAIN - FUNCTIONAL ASSESSMENT
PAIN_FUNCTIONAL_ASSESSMENT: 0-10

## 2024-05-06 NOTE — H&P
Procedure H&P    Patient Profile-Procedures  Name Marnie Jha  Date of Birth 1955  MRN [id number]  Address   [address] [place] [facility] [id number] [place] [facility] [address]    Primary Phone Number [phone]  Secondary Phone Number    Kecia Zafar    Procedure(s):  Procedures: EGD and Colonoscopy  Primary contact name and number   Extended Emergency Contact Information  Primary Emergency Contact: Shelley Jha  Address: [address] [place] [address] [facility]  Home Phone: [phone]  Relation: Spouse    General Health  Weight   Vitals:    05/06/24 1204   Weight: 85.3 kg (188 lb 0.8 oz)     BMI Body mass index is 32.28 kg/m².    Allergies  Allergies   Allergen Reactions    Demerol [Meperidine] Nausea/vomiting       Past Medical History   Past Medical History:   Diagnosis Date    Personal history of other diseases of the circulatory system     History of cardiac disorder    Personal history of other specified conditions     History of chest pain       Provider assessment  Diagnosis: Colon Cancer Screening/Surveillance  and Abdominal Pain  Medication Reviewed - yes  Prior to Admission medications    Medication Sig Start Date End Date Taking? Authorizing Provider   atorvastatin (Lipitor) 80 mg tablet Take 1 tablet (80 mg) by mouth once daily. 12/1/23  Yes Tre Ice, DO   citalopram (CeleXA) 40 mg tablet Take 1 tablet (40 mg) by mouth once daily. 12/1/23  Yes Tre Ice, DO   clonazePAM (KlonoPIN) 1 mg tablet Take 1 tablet (1 mg) by mouth 2 times a day. 3/4/24  Yes Tre Ice, DO   gabapentin (Neurontin) 300 mg capsule Take 1 capsule (300 mg) by mouth 2 times a day. 12/1/23  Yes Tre Ice, DO   lisinopril 20 mg tablet Take 1 tablet (20 mg) by mouth once every 24 hours. 12/1/23  Yes Tre Ice, DO   metoprolol succinate XL (Toprol-XL) 25 mg 24 hr tablet Take 1 tablet (25 mg) by mouth once every 24 hours. 12/1/23  Yes Tre Ice, DO   Norvasc 5 mg  tablet Take 1 tablet (5 mg) by mouth once every 24 hours. 12/1/23  Yes Tre Benavidez, DO   sodium,potassium,mag sulfates (Suprep) 17.5-3.13-1.6 gram recon soln solution Take one bottle twice as directed by the prep instructions 4/30/24 5/6/24 Yes David Hayes MD   buPROPion SR (Wellbutrin SR) 150 mg 12 hr tablet Take 1 tablet (150 mg) by mouth 2 times a day. Do not crush, chew, or split. 1/9/24 5/3/24  Tre Ice, DO       Physical Exam  Vitals:    05/06/24 1204   BP: 134/74   Pulse: 76   Resp: 20   Temp: 36 °C (96.8 °F)   SpO2: 96%        General: A&Ox3, NAD.  HEENT: AT/NC.   CV: RRR. No murmur.  Resp: CTA bilaterally. No wheezing, rhonchi or rales.   GI: Soft, NT/ND. BSx4.  Extrem: No edema. Pulses intact.  Neuro: No focal deficits.   Psych: Normal mood and affect.      Procedure Plan - pre-procedural (re)assesment completed by physician:  discharge/transfer patient when discharge criteria met    David Hayes MD  5/6/2024 1:06 PM

## 2024-05-06 NOTE — OP NOTE
SURGEON:  Adrian Renner MD    PREOPERATIVE DIAGNOSIS:    Left renal calculus.    POSTOPERATIVE DIAGNOSIS:    Left renal calculus.    PROCEDURE PERFORMED:    Extracorporeal shock wave lithotripsy left renal calculus.    ANESTHESIA:    General.    COMPLICATIONS:    None.    DRAINS:    None.     Patient was seen preoperatively, counseled regarding risks and  complications.     PROCEDURE IN DETAIL:    Patient was brought to the operating room.  He was given general  anesthesia.  Time-out and Huddle had already been done.  ESWL table  was brought in.  Left renal calculus was visualized under biplanar  fluoroscopy and given a total of 2500 shocks.  Stone appeared to  break   up nicely.  Patient tolerated the procedure well and was taken to  recovery room in good condition.       Adrian Renner MD    DD:  08/02/2023 14:20:59 EST  DT:  08/03/2023 06:57:05 EST  DICTATION NUMBER:  [id number]  INTERNAL JOB NUMBER:  [id number]             Electronic Signatures:  Adrian Renner) (Signed on 07-Aug-2023 10:10)   Authored  Unsigned, Draft (SYS GENERATED) (Entered on 03-Aug-2023 06:57)   Entered    Last Updated: 07-Aug-2023 10:10 by Adrian Renner)

## 2024-05-06 NOTE — DISCHARGE INSTRUCTIONS
Patient Instructions after a Colonoscopy      The anesthetics, sedatives or narcotics which were given to you today will be acting in your body for the next 24 hours, so you might feel a little sleepy or groggy.  This feeling should slowly wear off. Carefully read and follow the instructions.     You received sedation today:  - Do not drive or operate any machinery or power tools of any kind.   - No alcoholic beverages today, not even beer or wine.  - Do not make any important decisions or sign any legal documents.  - No over the counter medications that contain alcohol or that may cause drowsiness.  - Do not make any important decisions or sign any legal documents.    While it is common to experience mild to moderate abdominal distention, gas, or belching after your procedure, if any of these symptoms occur following discharge from the GI Lab or within one week of having your procedure, call the [facility] to be advised whether a visit to your nearest Urgent Care or Emergency Department is indicated.  Take this paper with you if you go.     - If you develop an allergic reaction to the medications that were given during your procedure such as difficulty breathing, rash, hives, severe nausea, vomiting or lightheadedness.  - If you experience chest pain, shortness of breath, severe abdominal pain, fevers and chills.  -If you develop signs and symptoms of bleeding such as blood in your spit, if your stools turn black, tarry, or bloody  - If you have not urinated within 8 hours following your procedure.  - If your IV site becomes painful, red, inflamed, or looks infected.    If you received a biopsy/polypectomy/sphincterotomy the following instructions apply below:    __ Do not use Aspirin containing products, non-steroidal medications or anti-coagulants for one week following your procedure. (Examples of these types of medications are: Advil, Arthrotec, Aleve, Coumadin, Ecotrin, Heparin, Ibuprofen,  Indocin, Motrin, Naprosyn, Nuprin, Plavix, Vioxx, and Voltarin, or their generic forms.  This list is not all-inclusive.  Check with your physician or pharmacist before resuming medications.)   __ Eat a soft diet today.  Avoid foods that are poorly digested for the next 24 hours.  These foods would include: nuts, beans, lettuce, red meats, and fried foods. Start with liquids and advance your diet as tolerated, gradually work up to eating solids.   __ Do not have a Barium Study or Enema for one week.    Your physician recommends the additional following instructions:    -You have a contact number available for emergencies. The signs and symptoms of potential delayed complications were discussed with you. You may return to normal activities tomorrow.  -Resume your previous diet.  -Continue your present medications.   -We are waiting for your pathology results.  -Your physician has recommended a repeat colonoscopy (date to be determined after pending pathology results are reviewed) for surveillance based on pathology results.  -The findings and recommendations have been discussed with you.  -The findings and recommendations were discussed with your family.  - Please see Medication Reconciliation Form for new medication/medications prescribed.       If you experience any problems or have any questions following discharge from the GI Lab, please call: [phone]

## 2024-05-06 NOTE — ANESTHESIA POSTPROCEDURE EVALUATION
Patient: Marnie Jha    Procedure Summary       Date: 05/06/24 Room / Location: [facility]    Anesthesia Start: 1259 Anesthesia Stop: 1350    Procedures:       EGD      COLONOSCOPY Diagnosis:       Heartburn      Colon adenocarcinoma (Multi)    Scheduled Providers: David Hayes MD; Samreen Khan MD; SADE Eid Responsible Provider: Samreen Khan MD    Anesthesia Type: MAC ASA Status: 3            Anesthesia Type: MAC    Vitals Value Taken Time   /73 05/06/24 1347   Temp 36.3 05/06/24 1350   Pulse 70 05/06/24 1349   Resp 14 05/06/24 1350   SpO2 97 % 05/06/24 1349   Vitals shown include unfiled device data.    Anesthesia Post Evaluation    Patient participation: complete - patient participated  Level of consciousness: awake  Pain management: adequate  Airway patency: patent  Cardiovascular status: acceptable  Respiratory status: acceptable  Hydration status: acceptable  Postoperative Nausea and Vomiting: none        No notable events documented.

## 2024-05-06 NOTE — Clinical Note
Patient tolerated the procedure well and is comfortable with no complaints of pain. Vital signs stable.  Patient transported to PACU via stretcher. Handoff completed.

## 2024-05-06 NOTE — ANESTHESIA PREPROCEDURE EVALUATION
Patient: Marnie Jha    Procedure Information       Date/Time: 05/06/24 1230    Scheduled providers: David Hayes MD; Samreen Khan MD; SADE Eid    Procedures:       COLONOSCOPY      EGD    Location: [facility]            Relevant Problems   Anesthesia (within normal limits)      Cardiac   (+) CAD (coronary artery disease)   (+) Chest pain, atypical   (+) HTN (hypertension)   (+) Hyperlipidemia   (+) Paroxysmal atrial fibrillation (Multi)   (+) Sick sinus syndrome (Multi)   (+) Thoracic aortic aneurysm, without rupture, unspecified (CMS-HCC)      Pulmonary   (+) ROBERT (obstructive sleep apnea)      Neuro   (+) Anxiety   (+) Depressive disorder      GI   (+) Acid reflux   (+) Colon adenocarcinoma (Multi)   (+) Malignant neoplasm of sigmoid colon (Multi)      Liver   (+) Colon adenocarcinoma (Multi)   (+) Liver lesion, left lobe   (+) Malignant neoplasm of sigmoid colon (Multi)      ID   (+) Disease due to severe acute respiratory syndrome coronavirus 2 (SARS-CoV-2)       Clinical information reviewed:   Tobacco  Allergies  Meds   Med Hx  Surg Hx   Fam Hx  Soc Hx        NPO Detail:  NPO/Void Status  Carbohydrate Drink Given Prior to Surgery? : N  Date of Last Liquid: 05/06/24  Time of Last Liquid: 0500  Date of Last Solid: 05/06/24  Time of Last Solid: 1900  Last Intake Type: Solid meal  Time of Last Void: 1153         Physical Exam    Airway  Mallampati: III  TM distance: >3 FB  Neck ROM: full     Cardiovascular - normal exam     Dental - normal exam     Pulmonary - normal exam     Abdominal - normal exam             Anesthesia Plan    History of general anesthesia?: yes  History of complications of general anesthesia?: no    ASA 3     MAC     The patient is not a current smoker.    intravenous induction   Anesthetic plan and risks discussed with patient.    Plan discussed with SADE.

## 2024-05-13 LAB
LABORATORY COMMENT REPORT: NORMAL
PATH REPORT.FINAL DX SPEC: NORMAL
PATH REPORT.GROSS SPEC: NORMAL
PATH REPORT.MICROSCOPIC SPEC OTHER STN: NORMAL
PATH REPORT.RELEVANT HX SPEC: NORMAL
PATH REPORT.TOTAL CANCER: NORMAL

## 2024-05-14 ENCOUNTER — APPOINTMENT (OUTPATIENT)
Dept: CARDIOLOGY | Facility: CLINIC | Age: 69
End: 2024-05-14
Payer: MEDICARE

## 2024-05-14 ENCOUNTER — OFFICE VISIT (OUTPATIENT)
Dept: CARDIOLOGY | Facility: CLINIC | Age: 69
End: 2024-05-14
Payer: MEDICARE

## 2024-05-14 VITALS
HEART RATE: 87 BPM | BODY MASS INDEX: 32.78 KG/M2 | WEIGHT: 192 LBS | SYSTOLIC BLOOD PRESSURE: 120 MMHG | HEIGHT: 64 IN | OXYGEN SATURATION: 96 % | DIASTOLIC BLOOD PRESSURE: 70 MMHG

## 2024-05-14 DIAGNOSIS — Z95.1 S/P CABG X 4: ICD-10-CM

## 2024-05-14 DIAGNOSIS — I49.5 SICK SINUS SYNDROME (MULTI): ICD-10-CM

## 2024-05-14 DIAGNOSIS — I25.10 CORONARY ARTERY DISEASE INVOLVING NATIVE CORONARY ARTERY OF NATIVE HEART WITHOUT ANGINA PECTORIS: Primary | ICD-10-CM

## 2024-05-14 DIAGNOSIS — I77.810 ASCENDING AORTA DILATATION (CMS-HCC): ICD-10-CM

## 2024-05-14 DIAGNOSIS — R07.89 MUSCULOSKELETAL CHEST PAIN: ICD-10-CM

## 2024-05-14 DIAGNOSIS — G62.9 NEUROPATHY: ICD-10-CM

## 2024-05-14 PROCEDURE — 3078F DIAST BP <80 MM HG: CPT | Performed by: INTERNAL MEDICINE

## 2024-05-14 PROCEDURE — 3008F BODY MASS INDEX DOCD: CPT | Performed by: INTERNAL MEDICINE

## 2024-05-14 PROCEDURE — 93000 ELECTROCARDIOGRAM COMPLETE: CPT | Performed by: INTERNAL MEDICINE

## 2024-05-14 PROCEDURE — 1036F TOBACCO NON-USER: CPT | Performed by: INTERNAL MEDICINE

## 2024-05-14 PROCEDURE — 1159F MED LIST DOCD IN RCRD: CPT | Performed by: INTERNAL MEDICINE

## 2024-05-14 PROCEDURE — 3074F SYST BP LT 130 MM HG: CPT | Performed by: INTERNAL MEDICINE

## 2024-05-14 PROCEDURE — 1160F RVW MEDS BY RX/DR IN RCRD: CPT | Performed by: INTERNAL MEDICINE

## 2024-05-14 PROCEDURE — 99214 OFFICE O/P EST MOD 30 MIN: CPT | Performed by: INTERNAL MEDICINE

## 2024-05-14 RX ORDER — GABAPENTIN 300 MG/1
300 CAPSULE ORAL 2 TIMES DAILY
Qty: 180 CAPSULE | Refills: 0 | Status: SHIPPED | OUTPATIENT
Start: 2024-05-14

## 2024-05-14 NOTE — PROGRESS NOTES
"  Subjective  Marnie Jha  is a 69 y.o. year old male who presents for F/U ASHD.  Notes occ. 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  No dyspnea, no palpitations,  no edema.    Blood pressure 120/70, pulse 87, height 1.626 m (5' 4\"), weight 87.1 kg (192 lb), SpO2 96%.   Demerol [meperidine]  Past Medical History:   Diagnosis Date    Personal history of other diseases of the circulatory system     History of cardiac disorder    Personal history of other specified conditions     History of chest pain     Past Surgical History:   Procedure Laterality Date    CT ANGIO NECK  3/19/2022    CT NECK ANGIO W AND WO IV CONTRAST 3/19/2022 [facility]    CT HEAD ANGIO W AND WO IV CONTRAST  3/19/2022    CT HEAD ANGIO W AND WO IV CONTRAST 3/19/2022 [facility]    OTHER SURGICAL HISTORY  01/17/2020    Cardiac catheterization    OTHER SURGICAL HISTORY  01/17/2020    Heart surgery    OTHER SURGICAL HISTORY  07/30/2021    Laparoscopic sigmoidectomy     Family History   Problem Relation Name Age of Onset    Hypertension Mother      Hypertension Father      Other (cardiac disorder) Father      Coronary artery disease Father       @SOC    Current Outpatient Medications   Medication Sig Dispense Refill    atorvastatin (Lipitor) 80 mg tablet Take 1 tablet (80 mg) by mouth once daily. 90 tablet 1    citalopram (CeleXA) 40 mg tablet Take 1 tablet (40 mg) by mouth once daily. 90 tablet 1    clonazePAM (KlonoPIN) 1 mg tablet Take 1 tablet (1 mg) by mouth 2 times a day. 60 tablet 2    gabapentin (Neurontin) 300 mg capsule Take 1 capsule (300 mg) by mouth 2 times a day. 180 capsule 0    lisinopril 20 mg tablet Take 1 tablet (20 mg) by mouth once every 24 hours. 90 tablet 1    metoprolol succinate XL (Toprol-XL) 25 mg 24 hr tablet Take 1 tablet (25 mg) by mouth once every 24 hours. 90 tablet 1    Norvasc 5 mg tablet Take 1 tablet (5 mg) by mouth once every 24 hours. 90 tablet 1     No current " facility-administered medications for this visit.        ROS  Review of Systems   All other systems reviewed and are negative.      Physical Exam  Physical Exam  Constitutional:       Appearance: He is obese.   HENT:      Head: Normocephalic and atraumatic.   Cardiovascular:      Rate and Rhythm: Normal rate and regular rhythm.   Pulmonary:      Effort: Pulmonary effort is normal.      Breath sounds: Normal breath sounds.   Abdominal:      Palpations: Abdomen is soft.   Musculoskeletal:      Right lower leg: No edema.      Left lower leg: No edema.   Skin:     General: Skin is warm and dry.   Neurological:      General: No focal deficit present.      Mental Status: He is alert and oriented to person, place, and time.   Psychiatric:         Mood and Affect: Mood normal.         Behavior: Behavior normal.          EKG  Encounter Date: 05/14/24   ECG 12 Lead    Narrative    Atrial paced rhythm at 76/min., ST-T abn., low voltage       Problem List Items Addressed This Visit       CAD (coronary artery disease) - Primary    Relevant Orders    ECG 12 Lead (Completed)    Musculoskeletal chest pain    Sick sinus syndrome (Multi)     S./P PPM         S/P CABG x 4     [facility] Dr. Jong CUNHA to LAD, Left Rad. To PDA and distal PDA, sequential SVG to distal OMG, 4/3/19 stress thallium negative for ischemia         Ascending aorta dilatation (CMS-HCC)     Followed by dr. Onel Segal              Same meds with recheck EKG 6 months follow up      Bassem Méndez MD

## 2024-05-14 NOTE — ASSESSMENT & PLAN NOTE
[facility] Dr. Jong CUNHA to LAD, Left Rad. To PDA and distal PDA, sequential SVG to distal OMG, 4/3/19 stress thallium negative for ischemia

## 2024-05-28 DIAGNOSIS — I48.0 PAROXYSMAL ATRIAL FIBRILLATION (MULTI): ICD-10-CM

## 2024-05-28 RX ORDER — METOPROLOL SUCCINATE 25 MG/1
25 TABLET, EXTENDED RELEASE ORAL DAILY
Qty: 90 TABLET | Refills: 1 | Status: SHIPPED | OUTPATIENT
Start: 2024-05-28

## 2024-06-03 ENCOUNTER — HOSPITAL ENCOUNTER (OUTPATIENT)
Dept: CARDIOLOGY | Facility: CLINIC | Age: 69
Discharge: HOME | End: 2024-06-03
Payer: MEDICARE

## 2024-06-03 DIAGNOSIS — I49.5 SICK SINUS SYNDROME (MULTI): ICD-10-CM

## 2024-06-03 DIAGNOSIS — Z95.0 CARDIAC PACEMAKER IN SITU: ICD-10-CM

## 2024-06-03 PROCEDURE — 93296 REM INTERROG EVL PM/IDS: CPT

## 2024-06-10 ENCOUNTER — APPOINTMENT (OUTPATIENT)
Dept: PRIMARY CARE | Facility: CLINIC | Age: 69
End: 2024-06-10
Payer: MEDICARE

## 2024-06-18 DIAGNOSIS — F41.9 ANXIETY: ICD-10-CM

## 2024-06-18 RX ORDER — CITALOPRAM 40 MG/1
40 TABLET, FILM COATED ORAL DAILY
Qty: 90 TABLET | Refills: 1 | Status: SHIPPED | OUTPATIENT
Start: 2024-06-18

## 2024-06-25 ENCOUNTER — APPOINTMENT (OUTPATIENT)
Dept: PRIMARY CARE | Facility: CLINIC | Age: 69
End: 2024-06-25
Payer: MEDICARE

## 2024-06-25 VITALS
DIASTOLIC BLOOD PRESSURE: 74 MMHG | WEIGHT: 184 LBS | OXYGEN SATURATION: 97 % | BODY MASS INDEX: 31.58 KG/M2 | SYSTOLIC BLOOD PRESSURE: 122 MMHG | HEART RATE: 89 BPM

## 2024-06-25 DIAGNOSIS — G47.9 SLEEPING DIFFICULTIES: Primary | ICD-10-CM

## 2024-06-25 PROCEDURE — 3008F BODY MASS INDEX DOCD: CPT | Performed by: STUDENT IN AN ORGANIZED HEALTH CARE EDUCATION/TRAINING PROGRAM

## 2024-06-25 PROCEDURE — 99213 OFFICE O/P EST LOW 20 MIN: CPT | Performed by: STUDENT IN AN ORGANIZED HEALTH CARE EDUCATION/TRAINING PROGRAM

## 2024-06-25 PROCEDURE — 3074F SYST BP LT 130 MM HG: CPT | Performed by: STUDENT IN AN ORGANIZED HEALTH CARE EDUCATION/TRAINING PROGRAM

## 2024-06-25 PROCEDURE — 1036F TOBACCO NON-USER: CPT | Performed by: STUDENT IN AN ORGANIZED HEALTH CARE EDUCATION/TRAINING PROGRAM

## 2024-06-25 PROCEDURE — 3078F DIAST BP <80 MM HG: CPT | Performed by: STUDENT IN AN ORGANIZED HEALTH CARE EDUCATION/TRAINING PROGRAM

## 2024-06-25 PROCEDURE — 1159F MED LIST DOCD IN RCRD: CPT | Performed by: STUDENT IN AN ORGANIZED HEALTH CARE EDUCATION/TRAINING PROGRAM

## 2024-06-25 ASSESSMENT — ENCOUNTER SYMPTOMS: DEPRESSION: 0

## 2024-06-25 ASSESSMENT — PATIENT HEALTH QUESTIONNAIRE - PHQ9
1. LITTLE INTEREST OR PLEASURE IN DOING THINGS: NOT AT ALL
2. FEELING DOWN, DEPRESSED OR HOPELESS: NOT AT ALL
SUM OF ALL RESPONSES TO PHQ9 QUESTIONS 1 AND 2: 0

## 2024-06-25 NOTE — PROGRESS NOTES
Subjective   Reason for Visit: Marnie Jha is an 69 y.o. male here for a Medicare Wellness visit.          Reviewed all medications by prescribing practitioner or clinical pharmacist (such as prescriptions, OTCs, herbal therapies and supplements) and documented in the medical record.    HPI    Patient comes in today for having years of falling asleep issue.  Patient i states his sleep is good but that the day he gets his overall come out falling asleep.  George for couple years a year. Never dx wit narcolepsy or evaluated    Patient Care Team:  Tre Benavidez DO as PCP - General (Internal Medicine)  Bassem Méndez MD as Consulting Physician (Cardiology)  Jayant Pineda MD as Consulting Physician (Hematology and Oncology)     Review of Systems   All other systems reviewed and are negative.      Objective   Vitals:  Pulse 89   Wt 83.5 kg (184 lb)   SpO2 97%   BMI 31.58 kg/m²     Vitals:    06/25/24 1154   BP: 122/74   Pulse: 89   SpO2: 97%       Physical Exam  Constitutional:       Appearance: Normal appearance.   HENT:      Head: Normocephalic and atraumatic.      Right Ear: Tympanic membrane and ear canal normal.      Left Ear: Tympanic membrane and ear canal normal.      Mouth/Throat:      Mouth: Mucous membranes are moist.      Pharynx: Oropharynx is clear.   Eyes:      Extraocular Movements: Extraocular movements intact.      Conjunctiva/sclera: Conjunctivae normal.      Pupils: Pupils are equal, round, and reactive to light.   Cardiovascular:      Rate and Rhythm: Normal rate and regular rhythm.      Pulses: Normal pulses.      Heart sounds: Normal heart sounds.   Pulmonary:      Effort: Pulmonary effort is normal.      Breath sounds: Normal breath sounds.   Abdominal:      General: Abdomen is flat. Bowel sounds are normal.      Palpations: Abdomen is soft.   Musculoskeletal:         General: Normal range of motion.      Cervical back: Normal range of motion and neck supple.   Skin:     General: Skin is warm  and dry.      Capillary Refill: Capillary refill takes 2 to 3 seconds.   Neurological:      General: No focal deficit present.      Mental Status: He is alert and oriented to person, place, and time. Mental status is at baseline.   Psychiatric:         Mood and Affect: Mood normal.         Behavior: Behavior normal.         Thought Content: Thought content normal.         Judgment: Judgment normal.         Assessment/Plan   1. Sleeping difficulties  - hx sound like narcolepsy  - recommend evaluatoi  - his klonpin could be the casue as well and did suggest weaning off but states he had horrible issues coming off of it last tiem  - Referral to Adult Sleep Medicine; Future

## 2024-06-25 NOTE — PROGRESS NOTES
Subjective   Patient ID: Marnie Jha is a 69 y.o. male who presents for Fatigue (Says he has these spells that he can't even keep his eyes open).    HPI     Review of Systems    Objective   /74 (BP Location: Right arm, Patient Position: Sitting, BP Cuff Size: Small adult)   Pulse 89   Wt 83.5 kg (184 lb)   SpO2 97%   BMI 31.58 kg/m²     Physical Exam    Assessment/Plan

## 2024-07-02 DIAGNOSIS — F41.9 ANXIETY: ICD-10-CM

## 2024-07-02 RX ORDER — CLONAZEPAM 1 MG/1
1 TABLET ORAL 2 TIMES DAILY
Qty: 60 TABLET | Refills: 2 | Status: SHIPPED | OUTPATIENT
Start: 2024-07-02

## 2024-07-08 ENCOUNTER — HOSPITAL ENCOUNTER (OUTPATIENT)
Dept: RADIOLOGY | Facility: HOSPITAL | Age: 69
Discharge: HOME | End: 2024-07-08
Payer: MEDICARE

## 2024-07-08 DIAGNOSIS — I77.810 THORACIC AORTIC ECTASIA (CMS-HCC): ICD-10-CM

## 2024-07-08 LAB
CREAT SERPL-MCNC: 1.4 MG/DL (ref 0.6–1.3)
GFR SERPL CREATININE-BSD FRML MDRD: 55 ML/MIN/1.73M*2

## 2024-07-08 PROCEDURE — 71275 CT ANGIOGRAPHY CHEST: CPT | Performed by: RADIOLOGY

## 2024-07-08 PROCEDURE — 2550000001 HC RX 255 CONTRASTS: Performed by: THORACIC SURGERY (CARDIOTHORACIC VASCULAR SURGERY)

## 2024-07-08 PROCEDURE — 82565 ASSAY OF CREATININE: CPT | Performed by: NURSE PRACTITIONER

## 2024-07-08 PROCEDURE — 71275 CT ANGIOGRAPHY CHEST: CPT

## 2024-07-15 ENCOUNTER — APPOINTMENT (OUTPATIENT)
Dept: CARDIAC SURGERY | Facility: CLINIC | Age: 69
End: 2024-07-15
Payer: MEDICARE

## 2024-07-16 ENCOUNTER — OFFICE VISIT (OUTPATIENT)
Dept: CARDIAC SURGERY | Facility: CLINIC | Age: 69
End: 2024-07-16
Payer: MEDICARE

## 2024-07-16 VITALS
HEIGHT: 64 IN | OXYGEN SATURATION: 99 % | SYSTOLIC BLOOD PRESSURE: 127 MMHG | HEART RATE: 77 BPM | RESPIRATION RATE: 18 BRPM | WEIGHT: 190.2 LBS | BODY MASS INDEX: 32.47 KG/M2 | TEMPERATURE: 98.2 F | DIASTOLIC BLOOD PRESSURE: 85 MMHG

## 2024-07-16 DIAGNOSIS — I71.21 ANEURYSM OF ASCENDING AORTA WITHOUT RUPTURE (CMS-HCC): ICD-10-CM

## 2024-07-16 DIAGNOSIS — I48.0 PAROXYSMAL ATRIAL FIBRILLATION (MULTI): Primary | ICD-10-CM

## 2024-07-16 DIAGNOSIS — I10 HYPERTENSION, UNSPECIFIED TYPE: ICD-10-CM

## 2024-07-16 PROCEDURE — 99215 OFFICE O/P EST HI 40 MIN: CPT

## 2024-07-16 PROCEDURE — 1159F MED LIST DOCD IN RCRD: CPT

## 2024-07-16 PROCEDURE — 3008F BODY MASS INDEX DOCD: CPT

## 2024-07-16 PROCEDURE — 93005 ELECTROCARDIOGRAM TRACING: CPT

## 2024-07-16 PROCEDURE — 1126F AMNT PAIN NOTED NONE PRSNT: CPT

## 2024-07-16 PROCEDURE — 3079F DIAST BP 80-89 MM HG: CPT

## 2024-07-16 PROCEDURE — 3074F SYST BP LT 130 MM HG: CPT

## 2024-07-16 PROCEDURE — 93010 ELECTROCARDIOGRAM REPORT: CPT

## 2024-07-16 RX ORDER — BUPROPION HYDROCHLORIDE 150 MG/1
150 TABLET, EXTENDED RELEASE ORAL
COMMUNITY
Start: 2024-07-01

## 2024-07-16 RX ORDER — ISOSORBIDE MONONITRATE 30 MG/1
30 TABLET, EXTENDED RELEASE ORAL DAILY
COMMUNITY

## 2024-07-16 RX ORDER — LISINOPRIL 20 MG/1
20 TABLET ORAL DAILY
Qty: 90 TABLET | Refills: 1 | Status: SHIPPED | OUTPATIENT
Start: 2024-07-16

## 2024-07-16 SDOH — ECONOMIC STABILITY: FOOD INSECURITY: WITHIN THE PAST 12 MONTHS, THE FOOD YOU BOUGHT JUST DIDN'T LAST AND YOU DIDN'T HAVE MONEY TO GET MORE.: NEVER TRUE

## 2024-07-16 SDOH — ECONOMIC STABILITY: FOOD INSECURITY: WITHIN THE PAST 12 MONTHS, YOU WORRIED THAT YOUR FOOD WOULD RUN OUT BEFORE YOU GOT MONEY TO BUY MORE.: NEVER TRUE

## 2024-07-16 ASSESSMENT — ENCOUNTER SYMPTOMS
SHORTNESS OF BREATH: 0
COUGH: 0
CHILLS: 0
WHEEZING: 0
DEPRESSION: 0
IRREGULAR HEARTBEAT: 0
LOSS OF SENSATION IN FEET: 0
DECREASED APPETITE: 0
FEVER: 0
OCCASIONAL FEELINGS OF UNSTEADINESS: 0

## 2024-07-16 ASSESSMENT — LIFESTYLE VARIABLES
AUDIT-C TOTAL SCORE: 0
HOW MANY STANDARD DRINKS CONTAINING ALCOHOL DO YOU HAVE ON A TYPICAL DAY: PATIENT DOES NOT DRINK
HOW OFTEN DO YOU HAVE SIX OR MORE DRINKS ON ONE OCCASION: NEVER
HOW OFTEN DO YOU HAVE A DRINK CONTAINING ALCOHOL: NEVER
SKIP TO QUESTIONS 9-10: 1

## 2024-07-16 ASSESSMENT — PAIN SCALES - GENERAL: PAINLEVEL: 0-NO PAIN

## 2024-07-16 NOTE — PROGRESS NOTES
Subjective   Marnie Jha is a 69 y.o. male.    Chief Complaint:  Follow-up (Marnie is here for a one year follow up visit with a CT scan.)    HPI  MR. Jha is a 69-year-old male who presents today for aortic follow-up visit for his aneurysm of the ascending aorta.  He states that he has been doing well for the most part, but does get on occasions a funny feeling in his chest, that he says he really cannot describe.  Denies chest pain, shortness of breath, lower extremity edema, dyspnea on exertion, dizziness, and syncopal episodes.  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  He had a recent CT scan that will be reviewed and discussed.    Review of Systems   Constitutional: Negative for chills, decreased appetite and fever.   Cardiovascular:  Negative for irregular heartbeat.   Respiratory:  Negative for cough, shortness of breath and wheezing.        Objective   Constitutional:       Appearance: Normal and healthy appearance.   Pulmonary:      Effort: Pulmonary effort is normal.   Psychiatric:         Attention and Perception: Attention normal.         Mood and Affect: Mood normal.         Speech: Speech normal.         Behavior: Behavior is cooperative.         Thought Content: Thought content normal.         Cognition and Memory: Cognition normal.         Judgment: Judgment normal.       CT/Results:  EKG dated July 16, 2024  Accelerated junctional rhythm with a HR of 73 BPM.    CT angio of the chest shows a slightly increased aneurysm of the ascending or aorta from 4.5 cm to approximately 4.7 cm.    I have personally reviewed and provided my interpretation of the results.       Assessment/Plan   Mr. Jha is a 69-year-old man who presents with his aortic aneurysm of the ascending aorta.  We discussed the amount of weight he is lifting at work 3 days out of the week.  I have asked that he try to maintain his lifting to no  more than 50 pounds and avoid lifting that amount of weight over his head is much as possible.  His blood pressure was well-controlled today at 127/85 mmHg. He did experience that feeling in chest that he states is not a pain during the visit, so an ECG was obtained.     Time 40 minutes   Detail Level: Zone

## 2024-07-22 DIAGNOSIS — I71.21 ANEURYSM OF ASCENDING AORTA WITHOUT RUPTURE (CMS-HCC): ICD-10-CM

## 2024-07-30 DIAGNOSIS — I10 HYPERTENSION, UNSPECIFIED TYPE: ICD-10-CM

## 2024-07-30 RX ORDER — AMLODIPINE BESYLATE 5 MG/1
5 TABLET ORAL DAILY
Qty: 90 TABLET | Refills: 1 | Status: SHIPPED | OUTPATIENT
Start: 2024-07-30

## 2024-08-05 ENCOUNTER — LAB (OUTPATIENT)
Dept: LAB | Facility: CLINIC | Age: 69
End: 2024-08-05
Payer: MEDICARE

## 2024-08-05 DIAGNOSIS — D3A.8: ICD-10-CM

## 2024-08-05 DIAGNOSIS — C18.9 COLON ADENOCARCINOMA (MULTI): ICD-10-CM

## 2024-08-05 LAB
ALBUMIN SERPL BCP-MCNC: 4.2 G/DL (ref 3.4–5)
ALP SERPL-CCNC: 61 U/L (ref 33–136)
ALT SERPL W P-5'-P-CCNC: 15 U/L (ref 10–52)
ANION GAP SERPL CALC-SCNC: 12 MMOL/L (ref 10–20)
AST SERPL W P-5'-P-CCNC: 14 U/L (ref 9–39)
BASOPHILS # BLD AUTO: 0.02 X10*3/UL (ref 0–0.1)
BASOPHILS NFR BLD AUTO: 0.4 %
BILIRUB SERPL-MCNC: 0.6 MG/DL (ref 0–1.2)
BUN SERPL-MCNC: 17 MG/DL (ref 6–23)
CALCIUM SERPL-MCNC: 8.6 MG/DL (ref 8.6–10.3)
CEA SERPL-MCNC: 1.2 UG/L
CHLORIDE SERPL-SCNC: 105 MMOL/L (ref 98–107)
CO2 SERPL-SCNC: 24 MMOL/L (ref 21–32)
CREAT SERPL-MCNC: 1.18 MG/DL (ref 0.5–1.3)
EGFRCR SERPLBLD CKD-EPI 2021: 67 ML/MIN/1.73M*2
EOSINOPHIL # BLD AUTO: 0.12 X10*3/UL (ref 0–0.7)
EOSINOPHIL NFR BLD AUTO: 2.3 %
ERYTHROCYTE [DISTWIDTH] IN BLOOD BY AUTOMATED COUNT: 13.5 % (ref 11.5–14.5)
GLUCOSE SERPL-MCNC: 123 MG/DL (ref 74–99)
HCT VFR BLD AUTO: 41.2 % (ref 41–52)
HGB BLD-MCNC: 14.2 G/DL (ref 13.5–17.5)
IMM GRANULOCYTES # BLD AUTO: 0.02 X10*3/UL (ref 0–0.7)
IMM GRANULOCYTES NFR BLD AUTO: 0.4 % (ref 0–0.9)
LYMPHOCYTES # BLD AUTO: 1.77 X10*3/UL (ref 1.2–4.8)
LYMPHOCYTES NFR BLD AUTO: 33.4 %
MCH RBC QN AUTO: 31.6 PG (ref 26–34)
MCHC RBC AUTO-ENTMCNC: 34.5 G/DL (ref 32–36)
MCV RBC AUTO: 92 FL (ref 80–100)
MONOCYTES # BLD AUTO: 0.37 X10*3/UL (ref 0.1–1)
MONOCYTES NFR BLD AUTO: 7 %
NEUTROPHILS # BLD AUTO: 3 X10*3/UL (ref 1.2–7.7)
NEUTROPHILS NFR BLD AUTO: 56.5 %
NRBC BLD-RTO: 0 /100 WBCS (ref 0–0)
PLATELET # BLD AUTO: 162 X10*3/UL (ref 150–450)
POTASSIUM SERPL-SCNC: 3.8 MMOL/L (ref 3.5–5.3)
PROT SERPL-MCNC: 6.2 G/DL (ref 6.4–8.2)
RBC # BLD AUTO: 4.5 X10*6/UL (ref 4.5–5.9)
SODIUM SERPL-SCNC: 137 MMOL/L (ref 136–145)
WBC # BLD AUTO: 5.3 X10*3/UL (ref 4.4–11.3)

## 2024-08-05 PROCEDURE — 80053 COMPREHEN METABOLIC PANEL: CPT

## 2024-08-05 PROCEDURE — 82378 CARCINOEMBRYONIC ANTIGEN: CPT | Mod: PARLAB

## 2024-08-05 PROCEDURE — 85025 COMPLETE CBC W/AUTO DIFF WBC: CPT

## 2024-08-05 PROCEDURE — 36415 COLL VENOUS BLD VENIPUNCTURE: CPT

## 2024-08-07 ENCOUNTER — TELEMEDICINE (OUTPATIENT)
Dept: HEMATOLOGY/ONCOLOGY | Facility: CLINIC | Age: 69
End: 2024-08-07
Payer: MEDICARE

## 2024-08-07 DIAGNOSIS — D3A.8: ICD-10-CM

## 2024-08-07 DIAGNOSIS — C18.9 COLON ADENOCARCINOMA (MULTI): ICD-10-CM

## 2024-08-07 PROCEDURE — 99213 OFFICE O/P EST LOW 20 MIN: CPT | Performed by: INTERNAL MEDICINE

## 2024-08-07 PROCEDURE — 99213 OFFICE O/P EST LOW 20 MIN: CPT | Mod: 95 | Performed by: INTERNAL MEDICINE

## 2024-08-08 NOTE — PROGRESS NOTES
Cancer History:   Treatment Synopsis:    E. RECTOSIGMOID COLON MASS, BIOPSY: ADENOCARCINOMA.   MISMATCH REPAIR PROTEIN EXPRESSION:     Protein: Result:   MLH-1 Expression Present    PMS-2 Expression Present   MSH-2 Expression Present   MSH-6 Expression Present     INTERPRETATION: Colon neoplasm with normal mismatch   repair protein expression.     FINAL DIAGNOSIS   A. SIGMOID COLON STAPLE LINE DISTAL:    --MODERATELY DIFFERENTIATED COLONIC ADENOCARCINOMA, 5.0 CM, WITH INVASION   THROUGH THE MUSCULARIS PROPRIA AND INTO PERICOLIC TISSUE, SEE SUMMARY.   --MARGINS OF EXCISION ARE NEGATIVE FOR CARCINOMA.   --MESENTERIC LYMPH NODES ARE NEGATIVE  FOR MALIGNANCY (0/11), SEE COMMENT.   --TUMOR DEPOSITS, TWO.      Procedure: Sigmoidectomy   Tumor Site: Sigmoid colon   Tumor Size: 5.0 cm   Macroscopic Tumor Perforation: Not identified   Histologic Type: Adenocarcinoma    Histologic Grade: G2   Tumor Extension: Tumor invades through the muscularis propria into pericolonic   tissue   Margins: All margins are uninvolved by invasive carcinoma, high grade   dysplasia/intramucosal carcinoma, and low grade dysplasia    Treatment Effect: No known presurgical therapy   Lymphovascular Invasion: Not identified   Perineural Invasion: Not identified   Tumor Deposits: Present, 2 deposits   Number of Lymph Nodes Involved: 0   Number of Lymph Nodes Examined:  11   Pathologic Staging (pTNM): pT3 pN1c   pt3, pN1c, M0 stage IIIB, July 2020  Duodenal nodule fine-needle biopsy revealed neuroendocrine tumor grade 1 on August 24, 2020  Case is Amended   FINAL DIAGNOSIS   A. STOMACH, DISTAL GASTRECTOMY: 4/22/2021    WELL DIFFERENTIATED G CELL NEUROENDOCRINE TUMOR, GRADE 1:  DUODENUM   METASTATIC WELL DIFFERENTIATED NEUROENDOCRINE TUMOR: LYMPH NODES, 2 OF 15   DISTAL GASTRECTOMYAND PROXIMAL DUODENAL RESECTION SPECIMEN   pT2, pN1, M0 stage IIIA                      History of Present Illness:      ID Statement:    MORENA GUNDERSON is a 68 year old  Male        Interval History:    The patient was referred to for further evaluation and management of recently diagnosed adenocarcinoma of the sigmoid colon. 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     The patient is a 65 year old man with a past medical history significant for CAD, HTN, Hyperlipidemia, dizziness fatigue and cancer.  The patient was doing well until he started having bowel movement complaints in early July, which prompted a colonoscopy  that revealed a mass in the sigmoid colon. He also obtained laparoscopic sigmoidectomy that revealed sigmoid mass and 2 tumor deposits, 0 out of 14 lymph nodes were positive. CT scan did not reveal  any metastatic disease but an upper endoscopy did reveal  duodenal nodules. The patient underwent nodule removal and is now 2 weeks s/p the surgery.     The patient had called for a follow up on 8/7/2024 regarding his history of colon cancer s/p resection.  Recall at last visit we discussed that his upper endoscopy and biopsy of duodenal nodule revealed grade 1 neuroendocrine tumor.  The patient has received  cycle 12 of adjuvant chemotherapy using FOLFOX and been tolerating well. 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 The patient reports flank  pain but is overall doing well. He denies any history of abdominal pain, diarrhea, wheezing and lymphadenopathy.      Past Medical History:  1. Adenocarcinoma of the sigmoid colon  2. CAD   3. HTN   4. Hyperlipidemia  5. Dizziness   6. Fatigue  7. Left Kidney lesion  8. Anxiety and Depression       Past Surgical History:  1. CABG (quadruple bypass)  2. Cholecystectomy   3. Lesion removals  4.  Status post pacemaker insertion in May 2022 for bradycardia and dizziness.  Stress test was reportedly  normal.  Family Medical History:  1. CAD   2. HTN     Last colonoscopy was in 07/2020.           Review of Systems:   ·  System Review All other systems have been reviewed and are negative for complaint.      · Constitutional NEGATIVE: Fever, Chills, Anorexia, Weight Loss, Malaise      · Eyes NEGATIVE: Blurry Vision, Drainage, Diploplia, Redness, Vision Loss/ Change      · ENMT NEGATIVE: Nasal Discharge, Nasal Congestion, Ear Pain, Mouth Pain, Throat Pain      · Respiratory NEGATIVE: Dry Cough, Productive Cough, Hemoptysis, Wheezing, Shortness of Breath      · Cardiology NEGATIVE: Chest Pain, Dyspnea on Exertion, Orthopnea, Palpitations, Syncope      ·  Gastrointestinal POSITIVE: Nausea     NEGATIVE: Abdominal Pain, Constipation, Diarrhea, Vomiting      · Genitourinary NEGATIVE: Discharge, Dysuria, Flank Pain, Frequency, Hematuria      · Musculoskeletal NEGATIVE: Decreased ROM, Pain, Swelling, Stiffness, Weakness      · Neurological NEGATIVE: Dizziness, Confusion, Headache, Seizures, Syncope      · Psychiatric NEGATIVE: Mood Changes, Anxiety, Hallucinations, Sleep Changes, Suicidal Ideas      · Skin NEGATIVE: Mass, Pain, Pruritus, Rash, Ulcer      · Endocrine NEGATIVE: Heat Intolerance, Cold Intolerance, Sweat, Polyuria, Thirst      · Hematologic/Lymph NEGATIVE: Anemia, Bruising, Easy Bleeding, Night Sweats, Petechiae      · Allergic/Immunologic NEGATIVE: Anaphylaxis, Itchy/ Teary Eyes, Itching, Sneezing, Swelling      · Breast NEGATIVE: Pain, Mass, Discharge, Nipple Itching, Gynecomastia         Allergies and Intolerances:       Intolerances:         Demerol: Drug, Nausea/Vomiting, Active     Outpatient Medication Profile:  * Patient Currently Takes Medications as of 08-Aug-2023 10:21 documented in Structured Notes         MiraLax oral powder for reconstitution : Last Dose Taken:  , 17 gram(s) orally once a day, As needed, Constipation, Start Date: 30-Mar-2023         Colace 100 mg oral capsule: Last Dose Taken:  ,  1 cap(s) orally 2 times  a day as needed for constipation , Start Date: 30-Mar-2023         Metoprolol Succinate ER 25 mg oral tablet, extended release: Last Dose  Taken:  , 1 tab(s) orally once a day, Start Date: 30-Mar-2023         acetaminophen 325 mg oral tablet: Last Dose Taken:  , 2 tab(s) orally  every 4 hours, As needed, Pain - Mild (1-3), Start Date: 26-Apr-2022         atorvastatin 80 mg oral tablet: Last Dose Taken:  , 1 tab(s) orally once  a day (at bedtime)         amLODIPine 10 mg oral tablet: Last Dose Taken:  , 1 tab(s) orally once  a day (in the morning)         lisinopril 20 mg oral tablet: Last Dose Taken:  , 1 tab(s) orally once  a day (in the morning)         citalopram 40 mg oral tablet: Last Dose Taken:  , 1 tab(s) orally once  a day (in the morning)         gabapentin 300 mg oral capsule: Last Dose Taken:  , 1 cap(s) orally 2  times a day         omeprazole 20 mg oral delayed release tablet: Last Dose Taken:  , 1 tab(s)  orally 2 times a day         famotidine 20 mg oral tablet: Last Dose Taken:  , 1 tab(s) orally 2 times  a day         clonazePAM 1 mg oral tablet: Last Dose Taken:  , 1 tab(s) orally in the  morning and at bedtime         aspirin 81 mg oral tablet: Last Dose Taken:  , 1 tab(s) oral once a day             Medical History:         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, Status: Active         Iron deficiency anemia: ICD-10: D50.9, Status: Active         Fatigue: ICD-10: R53.83, Status: Active         Dizziness: ICD-10: R42, Status: Active         Hyperlipidemia: ICD-10: E78.5, Status: Active         HTN (hypertension): ICD-10: I10, Status: Active         CAD (coronary artery disease): ICD-10: I25.10, Status: Active         Colon cancer: ICD-10: C18.9, Status: Active       Surg History:         Low iron: ICD-10: E61.1, Status: Active         Family history of cholecystectomy: ICD-10: Z83.79, Status:  Active         Status post coronary artery bypass graft: ICD-10: Z95.1,  Status:  Active     Family History: Family history of coronary artery  disease (Father Age Unknown)  Family history of hypertension (Father Age Unknown)  Family history of hypertension (Mother Age Unknown)      Social History:   Social Substance History:  ·  Smoking Status never smoker (1)   ·  Additional History     66 year old male    with no children  No substance history (1)           Vitals and Measurements:   Vitals: Temp: 36.6  HR: 72  RR: 18  BP: 118/80  SPO2%:   97   Measurements: HT(cm): 159.7  WT(kg): 85.6  BSA: 1.94   BMI:  33.5      Physical Exam:      Constitutional: Well developed, awake/alert/oriented  x3, no distress, alert and cooperative   Eyes: PERRL, EOMI, clear sclera   ENMT: mucous membranes moist, no apparent injury,  no lesions seen   Head/Neck: Neck supple, no apparent injury, thyroid  without mass or tenderness, No JVD, trachea midline, no bruits   Respiratory/Thorax: Patent airways, CTAB, normal  breath sounds with good chest expansion, thorax symmetric   Cardiovascular: Regular, rate and rhythm, no murmurs,  2+ equal pulses of the extremities, normal S 1and S 2   Gastrointestinal: Nondistended, soft, non-tender,  no rebound tenderness or guarding, no masses palpable, no organomegaly, +BS, no bruits   Genitourinary: No Discharge, vesicles or other abnormalities   Musculoskeletal: ROM intact, no joint swelling, normal  strength   Extremities: normal extremities, no cyanosis edema,  contusions or wounds, no clubbing   Neurological: alert and oriented x3, intact senses,  motor, response and reflexes, normal strength   Breast: No masses, tenderness, no discharge or discoloration   Lymphatic: No significant lymphadenopathy   Psychological: Appropriate mood and behavior   Skin: Warm and dry, no lesions, no rashes         Lab Results:           Radiology Result:     ·  Results           Impression:     CHEST:  1.  No focal infiltrate, pulmonary nodule or lymphadenopathy  identified.  2. Fusiform  dilatation of the ascending thoracic aorta, similar to  the prior study.        ABDOMEN-PELVIS:  1.  Postoperative changes, as above.  2. No new or enlarging mass or lymphadenopathy identified.  3. No evidence of bowel obstruction, free intraperitoneal air or  abnormal intra-abdominal fluid collection.      CT Chest Abdomen Pelvis w/wo IV Contrast [Feb 8 2022  1:50PM]        Assessment and Plan:         The patient was referred to for further evaluation and management of recently diagnosed adenocarcinoma of the sigmoid colon.      1. Adenocarcinoma of the sigmoid colon     The patient is a 65 year old man with a past medical history significant for CAD, HTN, Hyperlipidemia, dizziness fatigue and cancer.  The patient was doing well until he started having bowel movement complaints in early July, which prompted a colonoscopy  that revealed a mass in the sigmoid colon. He also obtained laparoscopic studies that revealed 2 tumor deposits. CT scan did not reveal  any metastatic disease but an upper endoscopy did reveal the nodules. The patient underwent nodule removal and is  now 2 weeks s/p the surgery.   We discussed that his diagnosis is stage III adenocarcinoma of the sigmoid colon and that she should initiate adjuvant chemotherapy to achieve absolute benefit. I offered him the following options:  1. Do nothing  2. Adjuvant chemotherapy using FOLFOX  The patient would like to proceed with chemotherapy every 2 weeks using FOLFOX.  Effect side effects explained.     The patient had called for a follow up on March August 7, 2024. Physical exam was within normal limits. I reviewed the lab data with the patient. Has tolerated Cycle 12 of chemotherapy using FOLFOX the patient is grateful for all the details. 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 The patient remains s/p gastrectomy and resection of the  non-secretory tumor. No treatement indicated for  neuroendocrine tumor however, the patient should be followed  clinically.  Restaging CT scan of chest abdomen pelvis in December 2022 did not reveal any evidence of local or distant recurrence, surgery related changes.  The patient is pleased.  CT scan of chest abdomen pelvis on October 2, 2023 did not reveal any evidence of local or distal recurrence return in 6 months.     2. CAD   - Managed by PCP, cardiology and cardiac surgery   - Anticoagulation with Lovenox 40 mg   -S/p pacemaker insertion in May 2022  3. HTN   - Managed by PCP, cardiology and cardiac surgery   - Metoprolol 50 mg   - Lisinopril 5 mg      4. Hyperlipidemia  - Atorvastatin 80 mg      5. Dizziness      6. Fatigue     7. Left Kidney lesion      - Surgical intervention     8. Mental health/ anxiety and depression   - Counseling services   - Celexa 40mg   - Klonopin 1 mg

## 2024-09-12 DIAGNOSIS — G62.9 NEUROPATHY: ICD-10-CM

## 2024-09-12 RX ORDER — GABAPENTIN 300 MG/1
300 CAPSULE ORAL 2 TIMES DAILY
Qty: 180 CAPSULE | Refills: 0 | Status: SHIPPED | OUTPATIENT
Start: 2024-09-12

## 2024-10-01 ENCOUNTER — APPOINTMENT (OUTPATIENT)
Dept: PRIMARY CARE | Facility: CLINIC | Age: 69
End: 2024-10-01
Payer: MEDICARE

## 2024-10-01 VITALS
BODY MASS INDEX: 31.58 KG/M2 | WEIGHT: 184 LBS | OXYGEN SATURATION: 98 % | DIASTOLIC BLOOD PRESSURE: 74 MMHG | HEART RATE: 88 BPM | SYSTOLIC BLOOD PRESSURE: 122 MMHG

## 2024-10-01 DIAGNOSIS — G47.30 SLEEP APNEA, UNSPECIFIED TYPE: Primary | ICD-10-CM

## 2024-10-01 PROCEDURE — 1036F TOBACCO NON-USER: CPT | Performed by: STUDENT IN AN ORGANIZED HEALTH CARE EDUCATION/TRAINING PROGRAM

## 2024-10-01 PROCEDURE — 3078F DIAST BP <80 MM HG: CPT | Performed by: STUDENT IN AN ORGANIZED HEALTH CARE EDUCATION/TRAINING PROGRAM

## 2024-10-01 PROCEDURE — 90662 IIV NO PRSV INCREASED AG IM: CPT | Performed by: STUDENT IN AN ORGANIZED HEALTH CARE EDUCATION/TRAINING PROGRAM

## 2024-10-01 PROCEDURE — G0008 ADMIN INFLUENZA VIRUS VAC: HCPCS | Performed by: STUDENT IN AN ORGANIZED HEALTH CARE EDUCATION/TRAINING PROGRAM

## 2024-10-01 PROCEDURE — 1123F ACP DISCUSS/DSCN MKR DOCD: CPT | Performed by: STUDENT IN AN ORGANIZED HEALTH CARE EDUCATION/TRAINING PROGRAM

## 2024-10-01 PROCEDURE — 1158F ADVNC CARE PLAN TLK DOCD: CPT | Performed by: STUDENT IN AN ORGANIZED HEALTH CARE EDUCATION/TRAINING PROGRAM

## 2024-10-01 PROCEDURE — 3074F SYST BP LT 130 MM HG: CPT | Performed by: STUDENT IN AN ORGANIZED HEALTH CARE EDUCATION/TRAINING PROGRAM

## 2024-10-01 PROCEDURE — 99214 OFFICE O/P EST MOD 30 MIN: CPT | Performed by: STUDENT IN AN ORGANIZED HEALTH CARE EDUCATION/TRAINING PROGRAM

## 2024-10-01 PROCEDURE — 1159F MED LIST DOCD IN RCRD: CPT | Performed by: STUDENT IN AN ORGANIZED HEALTH CARE EDUCATION/TRAINING PROGRAM

## 2024-10-01 ASSESSMENT — PATIENT HEALTH QUESTIONNAIRE - PHQ9
2. FEELING DOWN, DEPRESSED OR HOPELESS: NOT AT ALL
SUM OF ALL RESPONSES TO PHQ9 QUESTIONS 1 AND 2: 0
1. LITTLE INTEREST OR PLEASURE IN DOING THINGS: NOT AT ALL

## 2024-10-01 ASSESSMENT — ENCOUNTER SYMPTOMS: DEPRESSION: 0

## 2024-10-01 NOTE — PROGRESS NOTES
Subjective   Patient ID: Marnie Jha is a 69 y.o. male who presents for Sleeping Problem (Does not want sleep study -told him options to do at home).    HPI     Excessive sleepiness, does not sleep well, gets up a lot, falls asleep often at of the blud, snores, falling asleep in chair, hs 2nd shift and often doesntget to bed till 3 am    Review of Systems   All other systems reviewed and are negative.      Objective   /74 (BP Location: Left arm, Patient Position: Sitting)   Pulse 88   Wt 83.5 kg (184 lb)   SpO2 98%   BMI 31.58 kg/m²     Physical Exam  Constitutional:       Appearance: Normal appearance.   HENT:      Head: Normocephalic and atraumatic.      Right Ear: Tympanic membrane and ear canal normal.      Left Ear: Tympanic membrane and ear canal normal.      Mouth/Throat:      Mouth: Mucous membranes are moist.      Pharynx: Oropharynx is clear.   Eyes:      Extraocular Movements: Extraocular movements intact.      Conjunctiva/sclera: Conjunctivae normal.      Pupils: Pupils are equal, round, and reactive to light.   Cardiovascular:      Rate and Rhythm: Normal rate and regular rhythm.      Pulses: Normal pulses.      Heart sounds: Normal heart sounds.   Pulmonary:      Effort: Pulmonary effort is normal.      Breath sounds: Normal breath sounds.   Abdominal:      General: Abdomen is flat. Bowel sounds are normal.      Palpations: Abdomen is soft.   Musculoskeletal:         General: Normal range of motion.      Cervical back: Normal range of motion and neck supple.   Skin:     General: Skin is warm and dry.      Capillary Refill: Capillary refill takes 2 to 3 seconds.   Neurological:      General: No focal deficit present.      Mental Status: He is alert and oriented to person, place, and time. Mental status is at baseline.   Psychiatric:         Mood and Affect: Mood normal.         Behavior: Behavior normal.         Thought Content: Thought content normal.         Judgment: Judgment normal.          Assessment/Plan   1. Sleep apnea, unspecified type    - Home sleep apnea test (HSAT); Future  - wean off klonopin  - start 0.5 mg every day and 1 mg every day  for 1- 2 month and lower by 0.5 mg every 1- 2 months  - check sleep study

## 2024-10-08 ENCOUNTER — TELEPHONE (OUTPATIENT)
Dept: PRIMARY CARE | Facility: CLINIC | Age: 69
End: 2024-10-08
Payer: MEDICARE

## 2024-10-08 DIAGNOSIS — F41.9 ANXIETY: ICD-10-CM

## 2024-10-08 RX ORDER — CLONAZEPAM 1 MG/1
1 TABLET ORAL 2 TIMES DAILY
Qty: 60 TABLET | Refills: 2 | Status: SHIPPED | OUTPATIENT
Start: 2024-10-08

## 2024-10-14 DIAGNOSIS — C18.9 COLON ADENOCARCINOMA (MULTI): ICD-10-CM

## 2024-10-29 ENCOUNTER — CLINICAL SUPPORT (OUTPATIENT)
Dept: SLEEP MEDICINE | Facility: CLINIC | Age: 69
End: 2024-10-29
Payer: MEDICARE

## 2024-10-29 ENCOUNTER — TELEPHONE (OUTPATIENT)
Dept: PRIMARY CARE | Facility: CLINIC | Age: 69
End: 2024-10-29

## 2024-10-29 DIAGNOSIS — G47.30 SLEEP APNEA, UNSPECIFIED TYPE: ICD-10-CM

## 2024-10-29 DIAGNOSIS — F41.9 ANXIETY: ICD-10-CM

## 2024-10-29 DIAGNOSIS — G47.33 OBSTRUCTIVE SLEEP APNEA (ADULT) (PEDIATRIC): ICD-10-CM

## 2024-10-29 PROCEDURE — 95806 SLEEP STUDY UNATT&RESP EFFT: CPT | Performed by: INTERNAL MEDICINE

## 2024-10-29 RX ORDER — CLONAZEPAM 1 MG/1
1 TABLET ORAL 2 TIMES DAILY
Qty: 60 TABLET | Refills: 2 | Status: SHIPPED | OUTPATIENT
Start: 2024-10-29

## 2024-11-07 ENCOUNTER — OFFICE VISIT (OUTPATIENT)
Dept: PRIMARY CARE | Facility: CLINIC | Age: 69
End: 2024-11-07
Payer: MEDICARE

## 2024-11-07 VITALS
SYSTOLIC BLOOD PRESSURE: 112 MMHG | OXYGEN SATURATION: 97 % | HEART RATE: 73 BPM | BODY MASS INDEX: 32.61 KG/M2 | WEIGHT: 190 LBS | DIASTOLIC BLOOD PRESSURE: 70 MMHG

## 2024-11-07 DIAGNOSIS — Z00.00 ROUTINE GENERAL MEDICAL EXAMINATION AT A HEALTH CARE FACILITY: ICD-10-CM

## 2024-11-07 DIAGNOSIS — G47.33 OSA (OBSTRUCTIVE SLEEP APNEA): Primary | ICD-10-CM

## 2024-11-07 DIAGNOSIS — Z71.89 ACP (ADVANCE CARE PLANNING): ICD-10-CM

## 2024-11-07 DIAGNOSIS — Z00.00 ROUTINE GENERAL MEDICAL EXAMINATION AT HEALTH CARE FACILITY: ICD-10-CM

## 2024-11-07 DIAGNOSIS — F32.A DEPRESSION, UNSPECIFIED DEPRESSION TYPE: ICD-10-CM

## 2024-11-07 DIAGNOSIS — J43.9 PULMONARY EMPHYSEMA, UNSPECIFIED EMPHYSEMA TYPE (MULTI): ICD-10-CM

## 2024-11-07 PROCEDURE — 1123F ACP DISCUSS/DSCN MKR DOCD: CPT | Performed by: STUDENT IN AN ORGANIZED HEALTH CARE EDUCATION/TRAINING PROGRAM

## 2024-11-07 PROCEDURE — G0439 PPPS, SUBSEQ VISIT: HCPCS | Performed by: STUDENT IN AN ORGANIZED HEALTH CARE EDUCATION/TRAINING PROGRAM

## 2024-11-07 PROCEDURE — 3074F SYST BP LT 130 MM HG: CPT | Performed by: STUDENT IN AN ORGANIZED HEALTH CARE EDUCATION/TRAINING PROGRAM

## 2024-11-07 PROCEDURE — 1160F RVW MEDS BY RX/DR IN RCRD: CPT | Performed by: STUDENT IN AN ORGANIZED HEALTH CARE EDUCATION/TRAINING PROGRAM

## 2024-11-07 PROCEDURE — 1170F FXNL STATUS ASSESSED: CPT | Performed by: STUDENT IN AN ORGANIZED HEALTH CARE EDUCATION/TRAINING PROGRAM

## 2024-11-07 PROCEDURE — 99397 PER PM REEVAL EST PAT 65+ YR: CPT | Performed by: STUDENT IN AN ORGANIZED HEALTH CARE EDUCATION/TRAINING PROGRAM

## 2024-11-07 PROCEDURE — 1159F MED LIST DOCD IN RCRD: CPT | Performed by: STUDENT IN AN ORGANIZED HEALTH CARE EDUCATION/TRAINING PROGRAM

## 2024-11-07 PROCEDURE — 1036F TOBACCO NON-USER: CPT | Performed by: STUDENT IN AN ORGANIZED HEALTH CARE EDUCATION/TRAINING PROGRAM

## 2024-11-07 PROCEDURE — 3078F DIAST BP <80 MM HG: CPT | Performed by: STUDENT IN AN ORGANIZED HEALTH CARE EDUCATION/TRAINING PROGRAM

## 2024-11-07 PROCEDURE — G0444 DEPRESSION SCREEN ANNUAL: HCPCS | Performed by: STUDENT IN AN ORGANIZED HEALTH CARE EDUCATION/TRAINING PROGRAM

## 2024-11-07 ASSESSMENT — ENCOUNTER SYMPTOMS: DEPRESSION: 0

## 2024-11-07 NOTE — PROGRESS NOTES
Subjective   Patient ID: Marnie Jha is a 69 y.o. male who presents for Follow-up (Form&had sleep test=results).    HPI   CABG: follows cardiology, stable no chest pain, complicated with palpations and pm     Colon cancer: in remission, follows oncology hx of surgery, and chemo, in remission     HTN: stable on BP meds     Anxiety: was given  old pcp has been on  it for 15-20 years, tried to get off by he couldn't so went back on it     OARRS:  No data recorded  I have personally reviewed the OARRS report for Marnie Jha. I have considered the risks of abuse, dependence, addiction and diversion     Is the patient prescribed a combination of a benzodiazepine and opioid?  No     Last Urine Drug Screen / ordered today: Yes  Recent Results   No results found for this or any previous visit (from the past 8760 hour(s)).     Results are as expected.               Controlled Substance Agreement:  Date of the Last Agreement: 12/1/23  Reviewed Controlled Substance Agreement including but not limited to the benefits, risks, and alternatives to treatment with a Controlled Substance medication(s).     Benzodiazepines:  What is the patient's goal of therapy? anxiety  Is this being achieved with current treatment? yes     MARIO-7:  No data recorded     Activities of Daily Living:   Is your overall impression that this patient is benefiting (symptom reduction outweighs side effects) from benzodiazepine therapy? Yes      1. Physical Functioning: Better  2. Family Relationship: Better  3. Social Relationship: Better  4. Mood: Better  5. Sleep Patterns: Better  6. Overall Function: Better     Samantha; sleep study shows moderate samanhta does not use cpap    Review of Systems   All other systems reviewed and are negative.      Objective   /70 (BP Location: Left arm, Patient Position: Sitting, BP Cuff Size: Small adult)   Pulse 73   Wt 86.2 kg (190 lb)   SpO2 97%   BMI 32.61 kg/m²     Physical Exam  Constitutional:        Appearance: Normal appearance.   HENT:      Head: Normocephalic and atraumatic.      Right Ear: Tympanic membrane and ear canal normal.      Left Ear: Tympanic membrane and ear canal normal.      Mouth/Throat:      Mouth: Mucous membranes are moist.      Pharynx: Oropharynx is clear.   Eyes:      Extraocular Movements: Extraocular movements intact.      Conjunctiva/sclera: Conjunctivae normal.      Pupils: Pupils are equal, round, and reactive to light.   Cardiovascular:      Rate and Rhythm: Normal rate and regular rhythm.      Pulses: Normal pulses.      Heart sounds: Normal heart sounds.   Pulmonary:      Effort: Pulmonary effort is normal.      Breath sounds: Normal breath sounds.   Abdominal:      General: Abdomen is flat. Bowel sounds are normal.      Palpations: Abdomen is soft.   Musculoskeletal:         General: Normal range of motion.      Cervical back: Normal range of motion and neck supple.   Skin:     General: Skin is warm and dry.      Capillary Refill: Capillary refill takes 2 to 3 seconds.   Neurological:      General: No focal deficit present.      Mental Status: He is alert and oriented to person, place, and time. Mental status is at baseline.   Psychiatric:         Mood and Affect: Mood normal.         Behavior: Behavior normal.         Thought Content: Thought content normal.         Judgment: Judgment normal.       Assessment/Plan   1. ROBERT (obstructive sleep apnea) (Primary)  - Referral to Adult Sleep Medicine; Future  - Positive Airway Pressure (PAP) Therapy    2. Routine general medical examination at a health care facility  Utd on cancer screen    3. Pulmonary emphysema, unspecified emphysema type (Multi)  Stable breathign  Falls a sleep a lot  Sleep medicine referall    4. Routine general medical examination at health care facility  Labs orderd  - 1 Year Follow Up In Primary Care - Wellness Exam; Future    5. Depression, unspecified depression type  denies    6. ACP (advance care  planning)  No hcpoa  Lviing will    Full code    Tobacco/Alcohol/Opioid use, as well as Illicit Drug Use was screened for/reviewed and documented in Social Documentation section of the chart and medication list as appropriate    Depression Screening  Depression screening completed using the PHQ-2 questions, with results documented in the chart/encounter (~15min).  (See Rooming Screening section for documentation, or progress note for additional information)    Cardiac Risk Assessment  The ASCVD Risk score (Sterling LIVINGSTON, et al., 2019) failed to calculate for the following reasons:    Risk score cannot be calculated because patient has a medical history suggesting prior/existing ASCVD  Cardiovascular risk was discussed and, if needed, lifestyle modifications recommended, including nutritional choices, exercise, and elimination of habits contributing to risk. We agreed on a plan to reduce the current cardiovascular risk based on above discussion as needed.     Aspirin use/disuse was discussed and documented in the Problem List of the medical record (under Cardiac Risk Counseling) after reviewing the updated guidelines below:  Consider low dose Aspirin ( mg) use if the benefit for cardiovascular disease prevention outweighs risk for bleeding complications.   In general, low dose ASA should be considered:  In patients WITHOUT prior MI/stroke/PAD (primary prevention):   a. Age <60: Use if 10-year cardiovascular disease risk >20%, with discussion of risks and benefits with patient  b. Age 60-<70: Use if 10-year cardiovascular disease risk >20% and low bleeding (e.g., gastrointestinal) risk, with discussion of risks and benefits with patient  c. Age >=70: Do not use    In patients WITH prior MI/stroke/PAD (secondary prevention):   Generally use unless extremely high bleeding (e.g., gastrointenstinal) risk, with discussion of risks and benefits with patient    Advance Directives Discussion  Advanced Care Planning  (including a Living Will, Healthcare POA, as well as specific end of life choices and/or directives), was discussed with the patient and/or surrogate, voluntarily, and details of that discussion documented in the Problem List (under Advanced Directives Discussion) of the medical record.    (~16 min spent discussing above)     During the course of the visit the patient was educated and counseled about age appropriate screening and preventive services.   Completed preventive screenings were documented in the chart (see Routine Health Maintenance in Problem List) and orders were placed for outstanding screenings/procedures as documented in the Assessment and Plan.

## 2024-11-08 ASSESSMENT — ENCOUNTER SYMPTOMS
LOSS OF SENSATION IN FEET: 0
OCCASIONAL FEELINGS OF UNSTEADINESS: 0

## 2024-11-08 ASSESSMENT — ACTIVITIES OF DAILY LIVING (ADL)
TAKING_MEDICATION: INDEPENDENT
DRESSING: INDEPENDENT
MANAGING_FINANCES: INDEPENDENT
DOING_HOUSEWORK: INDEPENDENT
BATHING: INDEPENDENT
GROCERY_SHOPPING: INDEPENDENT

## 2024-11-12 ENCOUNTER — APPOINTMENT (OUTPATIENT)
Dept: CARDIOLOGY | Facility: CLINIC | Age: 69
End: 2024-11-12
Payer: MEDICARE

## 2024-11-12 ENCOUNTER — TELEPHONE (OUTPATIENT)
Dept: CARDIOLOGY | Facility: CLINIC | Age: 69
End: 2024-11-12

## 2024-11-12 VITALS
BODY MASS INDEX: 32.61 KG/M2 | HEART RATE: 77 BPM | SYSTOLIC BLOOD PRESSURE: 112 MMHG | OXYGEN SATURATION: 95 % | WEIGHT: 191 LBS | HEIGHT: 64 IN | DIASTOLIC BLOOD PRESSURE: 80 MMHG

## 2024-11-12 DIAGNOSIS — G47.33 OBSTRUCTIVE SLEEP APNEA SYNDROME: ICD-10-CM

## 2024-11-12 DIAGNOSIS — R00.1 BRADYCARDIA: ICD-10-CM

## 2024-11-12 DIAGNOSIS — I49.5 SICK SINUS SYNDROME (MULTI): ICD-10-CM

## 2024-11-12 DIAGNOSIS — I25.10 CORONARY ARTERY DISEASE INVOLVING NATIVE CORONARY ARTERY OF NATIVE HEART WITHOUT ANGINA PECTORIS: Primary | ICD-10-CM

## 2024-11-12 DIAGNOSIS — G89.29 CHRONIC RIGHT SHOULDER PAIN: ICD-10-CM

## 2024-11-12 DIAGNOSIS — I10 PRIMARY HYPERTENSION: ICD-10-CM

## 2024-11-12 DIAGNOSIS — M25.511 CHRONIC RIGHT SHOULDER PAIN: ICD-10-CM

## 2024-11-12 DIAGNOSIS — E78.00 PURE HYPERCHOLESTEROLEMIA: ICD-10-CM

## 2024-11-12 DIAGNOSIS — R07.89 MUSCULOSKELETAL CHEST PAIN: ICD-10-CM

## 2024-11-12 LAB
ATRIAL RATE: 77 BPM
P OFFSET: 179 MS
P ONSET: 130 MS
PR INTERVAL: 222 MS
Q ONSET: 215 MS
QRS COUNT: 12 BEATS
QRS DURATION: 104 MS
QT INTERVAL: 430 MS
QTC CALCULATION(BAZETT): 486 MS
QTC FREDERICIA: 467 MS
R AXIS: 6 DEGREES
T AXIS: 77 DEGREES
T OFFSET: 430 MS
VENTRICULAR RATE: 77 BPM

## 2024-11-12 PROCEDURE — 1036F TOBACCO NON-USER: CPT | Performed by: INTERNAL MEDICINE

## 2024-11-12 PROCEDURE — 93010 ELECTROCARDIOGRAM REPORT: CPT | Performed by: INTERNAL MEDICINE

## 2024-11-12 PROCEDURE — 99215 OFFICE O/P EST HI 40 MIN: CPT | Mod: 25 | Performed by: INTERNAL MEDICINE

## 2024-11-12 PROCEDURE — 93005 ELECTROCARDIOGRAM TRACING: CPT | Performed by: INTERNAL MEDICINE

## 2024-11-12 PROCEDURE — 99215 OFFICE O/P EST HI 40 MIN: CPT | Performed by: INTERNAL MEDICINE

## 2024-11-12 PROCEDURE — 1160F RVW MEDS BY RX/DR IN RCRD: CPT | Performed by: INTERNAL MEDICINE

## 2024-11-12 PROCEDURE — 3079F DIAST BP 80-89 MM HG: CPT | Performed by: INTERNAL MEDICINE

## 2024-11-12 PROCEDURE — 1123F ACP DISCUSS/DSCN MKR DOCD: CPT | Performed by: INTERNAL MEDICINE

## 2024-11-12 PROCEDURE — 3074F SYST BP LT 130 MM HG: CPT | Performed by: INTERNAL MEDICINE

## 2024-11-12 PROCEDURE — 3008F BODY MASS INDEX DOCD: CPT | Performed by: INTERNAL MEDICINE

## 2024-11-12 PROCEDURE — 1159F MED LIST DOCD IN RCRD: CPT | Performed by: INTERNAL MEDICINE

## 2024-11-12 RX ORDER — REGADENOSON 0.08 MG/ML
0.4 INJECTION, SOLUTION INTRAVENOUS
OUTPATIENT
Start: 2024-11-12

## 2024-11-12 RX ORDER — AMINOPHYLLINE 25 MG/ML
125 INJECTION, SOLUTION INTRAVENOUS ONCE AS NEEDED
OUTPATIENT
Start: 2024-11-12

## 2024-11-12 NOTE — ASSESSMENT & PLAN NOTE
[facility] Dr. Jong CUNHA to LAD, Left rad. To PDA and disal PDA sequential SVG to distal OMG,  4/3/19 stress thallium negative for ischemia

## 2024-11-12 NOTE — ASSESSMENT & PLAN NOTE
Possible myocardial ischemic equivalent   no rashes , no suspicious lesions , no areas of discoloration , no jaundice present , good turgor , no masses , no tenderness on palpation

## 2024-11-12 NOTE — PROGRESS NOTES
"  Subjective  Marnie Jha  is a 69 y.o. year old male who presents for   F/U ASHD.  Had sleep study from Dr. Benavidez due to daytime somnolence.  Snores badly at night. Notes right posterior  shoulder pain after lifting things .  No chest pain, no dyspnea  Blood pressure 112/80, pulse 77, height 1.626 m (5' 4\"), weight 86.6 kg (191 lb), SpO2 95%.   Demerol [meperidine]  Past Medical History:   Diagnosis Date    Personal history of other diseases of the circulatory system     History of cardiac disorder    Personal history of other specified conditions     History of chest pain     Past Surgical History:   Procedure Laterality Date    CT ANGIO NECK  3/19/2022    CT NECK ANGIO W AND WO IV CONTRAST 3/19/2022 [facility]    CT HEAD ANGIO W AND WO IV CONTRAST  3/19/2022    CT HEAD ANGIO W AND WO IV CONTRAST 3/19/2022 [facility]    OTHER SURGICAL HISTORY  01/17/2020    Cardiac catheterization    OTHER SURGICAL HISTORY  01/17/2020    Heart surgery    OTHER SURGICAL HISTORY  07/30/2021    Laparoscopic sigmoidectomy     Family History   Problem Relation Name Age of Onset    Hypertension Mother      Hypertension Father      Other (cardiac disorder) Father      Coronary artery disease Father       @SOC    Current Outpatient Medications   Medication Sig Dispense Refill    amLODIPine (Norvasc) 5 mg tablet TAKE ONE TABLET BY MOUTH EVERY DAY 90 tablet 1    atorvastatin (Lipitor) 80 mg tablet Take 1 tablet (80 mg) by mouth once daily. 90 tablet 1    buPROPion SR (Wellbutrin SR) 150 mg 12 hr tablet Take 1 tablet (150 mg) by mouth.      citalopram (CeleXA) 40 mg tablet Take 1 tablet (40 mg) by mouth once daily. 90 tablet 1    clonazePAM (KlonoPIN) 1 mg tablet Take 1 tablet (1 mg) by mouth 2 times a day. 60 tablet 2    gabapentin (Neurontin) 300 mg capsule Take 1 capsule (300 mg) by mouth 2 times a day. 180 capsule 0    isosorbide mononitrate ER (Imdur) 30 mg 24 hr tablet Take 1 tablet (30 mg) by mouth once daily.   "    lisinopril 20 mg tablet TAKE ONE TABLET BY MOUTH EVERY DAY 90 tablet 1    metoprolol succinate XL (Toprol-XL) 25 mg 24 hr tablet TAKE ONE TABLET BY MOUTH EVERY DAY 90 tablet 1     No current facility-administered medications for this visit.        ROS  Review of Systems   All other systems reviewed and are negative.      Physical Exam  Physical Exam  Constitutional:       Appearance: He is obese.   HENT:      Head: Normocephalic and atraumatic.   Cardiovascular:      Rate and Rhythm: Normal rate and regular rhythm.      Heart sounds: S1 normal.   Pulmonary:      Effort: Pulmonary effort is normal.      Breath sounds: Normal breath sounds.   Abdominal:      Palpations: Abdomen is soft.   Musculoskeletal:      Right lower leg: No edema.      Left lower leg: No edema.   Skin:     General: Skin is warm and dry.   Neurological:      General: No focal deficit present.      Mental Status: He is alert and oriented to person, place, and time.   Psychiatric:         Mood and Affect: Mood normal.         Behavior: Behavior normal.          EKG  Encounter Date: 11/12/24   ECG 12 Lead   Result Value    Ventricular Rate 77    Atrial Rate 77    AR Interval 222    QRS Duration 104    QT Interval 430    QTC Calculation(Bazett) 486    R Axis 6    T Axis 77    QRS Count 12    Q Onset 215    P Onset 130    P Offset 179    T Offset 430    QTC Fredericia 467    Narrative    Atrial-paced rhythm with prolonged AV conduction  Incomplete right bundle branch block  T wave abnormality, consider anterolateral ischemia  Prolonged QT  Abnormal ECG  When compared with ECG of 12-NOV-2023 17:00,  No significant change was found       Problem List Items Addressed This Visit       Bradycardia     [facility] Left rad. To PDA and disal PDA sequential SVG to distal OMG,  4/3/19 stress thallium negative for ischemia         CAD (coronary artery disease) - Primary    Relevant Orders    ECG 12 Lead (Completed)    Nuclear Stress Test     Musculoskeletal chest pain    HTN (hypertension)    Hyperlipidemia    Sick sinus syndrome (Multi)     S/P PPM         Obstructive sleep apnea syndrome    Chronic right shoulder pain     Possible myocardial ischemic equivalent         Relevant Orders    Nuclear Stress Test         Lexiscan (unable to reach target HR with previous treadmll test)with office vist a few days later      Bassem Méndez MD

## 2024-11-12 NOTE — TELEPHONE ENCOUNTER
Copy of DOT clearance for pacemaker filled out by Dr. Ramicone given to patient. Patient has stress test scheduled 11/19/24 for Dr. Méndez's portion of DOT. Pt aware.

## 2024-11-12 NOTE — TELEPHONE ENCOUNTER
PT is here for his appt with Dr. Méndez right now. He is asking about the paperwork he dropped off for Dr. Ramicone 2 weeks ago Re: his DOT

## 2024-11-19 ENCOUNTER — HOSPITAL ENCOUNTER (OUTPATIENT)
Dept: RADIOLOGY | Facility: CLINIC | Age: 69
Discharge: HOME | End: 2024-11-19
Payer: MEDICARE

## 2024-11-19 ENCOUNTER — HOSPITAL ENCOUNTER (OUTPATIENT)
Dept: CARDIOLOGY | Facility: CLINIC | Age: 69
Discharge: HOME | End: 2024-11-19
Payer: MEDICARE

## 2024-11-19 DIAGNOSIS — R42 DIZZINESS: Primary | ICD-10-CM

## 2024-11-19 DIAGNOSIS — J43.9 PULMONARY EMPHYSEMA, UNSPECIFIED EMPHYSEMA TYPE (MULTI): ICD-10-CM

## 2024-11-19 DIAGNOSIS — G89.29 CHRONIC RIGHT SHOULDER PAIN: ICD-10-CM

## 2024-11-19 DIAGNOSIS — M25.511 CHRONIC RIGHT SHOULDER PAIN: ICD-10-CM

## 2024-11-19 DIAGNOSIS — I25.10 CORONARY ARTERY DISEASE INVOLVING NATIVE CORONARY ARTERY OF NATIVE HEART WITHOUT ANGINA PECTORIS: ICD-10-CM

## 2024-11-19 DIAGNOSIS — I77.810 ASCENDING AORTA DILATATION (CMS-HCC): Primary | ICD-10-CM

## 2024-11-19 PROCEDURE — 3430000001 HC RX 343 DIAGNOSTIC RADIOPHARMACEUTICALS: Performed by: INTERNAL MEDICINE

## 2024-11-19 PROCEDURE — A9502 TC99M TETROFOSMIN: HCPCS | Performed by: INTERNAL MEDICINE

## 2024-11-19 PROCEDURE — 93018 CV STRESS TEST I&R ONLY: CPT | Performed by: INTERNAL MEDICINE

## 2024-11-19 PROCEDURE — 93016 CV STRESS TEST SUPVJ ONLY: CPT | Performed by: INTERNAL MEDICINE

## 2024-11-19 PROCEDURE — 93017 CV STRESS TEST TRACING ONLY: CPT

## 2024-11-19 PROCEDURE — 78452 HT MUSCLE IMAGE SPECT MULT: CPT

## 2024-11-19 PROCEDURE — 78452 HT MUSCLE IMAGE SPECT MULT: CPT | Performed by: INTERNAL MEDICINE

## 2024-11-19 PROCEDURE — 2500000004 HC RX 250 GENERAL PHARMACY W/ HCPCS (ALT 636 FOR OP/ED): Performed by: INTERNAL MEDICINE

## 2024-11-19 RX ORDER — REGADENOSON 0.08 MG/ML
0.4 INJECTION, SOLUTION INTRAVENOUS
Status: COMPLETED | OUTPATIENT
Start: 2024-11-19 | End: 2024-11-19

## 2024-11-26 DIAGNOSIS — F32.A DEPRESSION, UNSPECIFIED DEPRESSION TYPE: Primary | ICD-10-CM

## 2024-11-26 RX ORDER — BUPROPION HYDROCHLORIDE 150 MG/1
TABLET, EXTENDED RELEASE ORAL
Qty: 60 TABLET | Refills: 3 | Status: SHIPPED | OUTPATIENT
Start: 2024-11-26

## 2024-12-02 ENCOUNTER — HOSPITAL ENCOUNTER (OUTPATIENT)
Dept: CARDIOLOGY | Facility: CLINIC | Age: 69
Discharge: HOME | End: 2024-12-02
Payer: MEDICARE

## 2024-12-02 DIAGNOSIS — I49.5 SICK SINUS SYNDROME (MULTI): ICD-10-CM

## 2024-12-02 DIAGNOSIS — Z95.0 CARDIAC PACEMAKER IN SITU: ICD-10-CM

## 2024-12-02 PROCEDURE — 93296 REM INTERROG EVL PM/IDS: CPT

## 2024-12-03 ENCOUNTER — OFFICE VISIT (OUTPATIENT)
Dept: CARDIOLOGY | Facility: CLINIC | Age: 69
End: 2024-12-03
Payer: MEDICARE

## 2024-12-03 VITALS
HEIGHT: 64 IN | DIASTOLIC BLOOD PRESSURE: 82 MMHG | BODY MASS INDEX: 32.61 KG/M2 | SYSTOLIC BLOOD PRESSURE: 122 MMHG | HEART RATE: 93 BPM | WEIGHT: 191 LBS | OXYGEN SATURATION: 95 %

## 2024-12-03 DIAGNOSIS — G89.29 CHRONIC RIGHT SHOULDER PAIN: ICD-10-CM

## 2024-12-03 DIAGNOSIS — I10 PRIMARY HYPERTENSION: ICD-10-CM

## 2024-12-03 DIAGNOSIS — Z95.1 S/P CABG X 4: ICD-10-CM

## 2024-12-03 DIAGNOSIS — R00.1 BRADYCARDIA: Primary | ICD-10-CM

## 2024-12-03 DIAGNOSIS — I25.10 CORONARY ARTERY DISEASE INVOLVING NATIVE CORONARY ARTERY OF NATIVE HEART WITHOUT ANGINA PECTORIS: ICD-10-CM

## 2024-12-03 DIAGNOSIS — M25.511 CHRONIC RIGHT SHOULDER PAIN: ICD-10-CM

## 2024-12-03 DIAGNOSIS — E78.00 PURE HYPERCHOLESTEROLEMIA: ICD-10-CM

## 2024-12-03 DIAGNOSIS — I49.5 SICK SINUS SYNDROME (MULTI): ICD-10-CM

## 2024-12-03 PROCEDURE — 1159F MED LIST DOCD IN RCRD: CPT | Performed by: INTERNAL MEDICINE

## 2024-12-03 PROCEDURE — 3008F BODY MASS INDEX DOCD: CPT | Performed by: INTERNAL MEDICINE

## 2024-12-03 PROCEDURE — 1123F ACP DISCUSS/DSCN MKR DOCD: CPT | Performed by: INTERNAL MEDICINE

## 2024-12-03 PROCEDURE — 3079F DIAST BP 80-89 MM HG: CPT | Performed by: INTERNAL MEDICINE

## 2024-12-03 PROCEDURE — 1160F RVW MEDS BY RX/DR IN RCRD: CPT | Performed by: INTERNAL MEDICINE

## 2024-12-03 PROCEDURE — 99214 OFFICE O/P EST MOD 30 MIN: CPT | Performed by: INTERNAL MEDICINE

## 2024-12-03 PROCEDURE — 1036F TOBACCO NON-USER: CPT | Performed by: INTERNAL MEDICINE

## 2024-12-03 PROCEDURE — 3074F SYST BP LT 130 MM HG: CPT | Performed by: INTERNAL MEDICINE

## 2024-12-03 NOTE — PROGRESS NOTES
"  Subjective  Marnie Jha  is a 69 y.o. year old male who presents for Lexiscan follow up no further shoulder pain.  No dyspnea, no chest pain, no palpitations, no edema    Blood pressure 122/82, pulse 93, height 1.626 m (5' 4\"), weight 86.6 kg (191 lb), SpO2 95%.   Demerol [meperidine]  Past Medical History:   Diagnosis Date    Personal history of other diseases of the circulatory system     History of cardiac disorder    Personal history of other specified conditions     History of chest pain     Past Surgical History:   Procedure Laterality Date    CT ANGIO NECK  3/19/2022    CT NECK ANGIO W AND WO IV CONTRAST 3/19/2022 [facility]    CT HEAD ANGIO W AND WO IV CONTRAST  3/19/2022    CT HEAD ANGIO W AND WO IV CONTRAST 3/19/2022 [facility]    OTHER SURGICAL HISTORY  01/17/2020    Cardiac catheterization    OTHER SURGICAL HISTORY  01/17/2020    Heart surgery    OTHER SURGICAL HISTORY  07/30/2021    Laparoscopic sigmoidectomy     Family History   Problem Relation Name Age of Onset    Hypertension Mother      Hypertension Father      Other (cardiac disorder) Father      Coronary artery disease Father       @SOC    Current Outpatient Medications   Medication Sig Dispense Refill    amLODIPine (Norvasc) 5 mg tablet TAKE ONE TABLET BY MOUTH EVERY DAY 90 tablet 1    atorvastatin (Lipitor) 80 mg tablet Take 1 tablet (80 mg) by mouth once daily. 90 tablet 1    buPROPion SR (Wellbutrin SR) 150 mg 12 hr tablet TAKE ONE TABLET BY MOUTH TWO TIMES A DAY . DO NOT CRUSH, CHEW OR SPLIT 60 tablet 3    citalopram (CeleXA) 40 mg tablet Take 1 tablet (40 mg) by mouth once daily. 90 tablet 1    clonazePAM (KlonoPIN) 1 mg tablet Take 1 tablet (1 mg) by mouth 2 times a day. 60 tablet 2    gabapentin (Neurontin) 300 mg capsule Take 1 capsule (300 mg) by mouth 2 times a day. 180 capsule 0    isosorbide mononitrate ER (Imdur) 30 mg 24 hr tablet Take 1 tablet (30 mg) by mouth once daily.      lisinopril 20 mg tablet TAKE " ONE TABLET BY MOUTH EVERY DAY 90 tablet 1    metoprolol succinate XL (Toprol-XL) 25 mg 24 hr tablet TAKE ONE TABLET BY MOUTH EVERY DAY 90 tablet 1     No current facility-administered medications for this visit.        ROS  Review of Systems    Physical Exam  Physical Exam  Constitutional:       Appearance: He is obese.   HENT:      Head: Normocephalic and atraumatic.   Cardiovascular:      Rate and Rhythm: Normal rate and regular rhythm.      Heart sounds: S1 normal and S2 normal.   Pulmonary:      Breath sounds: Normal breath sounds.   Musculoskeletal:      Right lower leg: No edema.      Left lower leg: No edema.   Skin:     General: Skin is warm and dry.   Neurological:      General: No focal deficit present.      Mental Status: He is alert and oriented to person, place, and time.   Psychiatric:         Mood and Affect: Mood normal.         Behavior: Behavior normal.          EKG  Encounter Date: 11/12/24   ECG 12 Lead   Result Value    Ventricular Rate 77    Atrial Rate 77    AL Interval 222    QRS Duration 104    QT Interval 430    QTC Calculation(Bazett) 486    R Axis 6    T Axis 77    QRS Count 12    Q Onset 215    P Onset 130    P Offset 179    T Offset 430    QTC Fredericia 467    Narrative    Atrial-paced rhythm with prolonged AV conduction  Incomplete right bundle branch block  T wave abnormality, consider anterolateral ischemia  Prolonged QT  Abnormal ECG  When compared with ECG of 12-NOV-2023 17:00,  No significant change was found  Confirmed by Bassem Méndez ([id number]) on 11/12/2024 4:40:12 PM       Problem List Items Addressed This Visit       Bradycardia - Primary    CAD (coronary artery disease)     11/19/24 Lexiscan normal ST response with normal scan and LVEf = 71%         HTN (hypertension)    Hyperlipidemia    Sick sinus syndrome (Multi)     S/P PPM         S/P CABG x 4     [facility] 4/3/19 Dr. Jong CUNHA to LAD, Left rad to PDA and distal PDA sequential SVG to distal OMG         Chronic right shoulder  pain     Musculoskeletal with negative Lexiscan 11/19/24              Return 6 months with EKG      Bassem Méndez MD

## 2024-12-03 NOTE — ASSESSMENT & PLAN NOTE
[facility] 4/3/19 Dr. Jong CUNHA to LAD, Left rad to PDA and distal PDA sequential SVG to distal OMG

## 2024-12-21 ENCOUNTER — HOSPITAL ENCOUNTER (OUTPATIENT)
Facility: HOSPITAL | Age: 69
Setting detail: OBSERVATION
End: 2024-12-21
Attending: STUDENT IN AN ORGANIZED HEALTH CARE EDUCATION/TRAINING PROGRAM | Admitting: STUDENT IN AN ORGANIZED HEALTH CARE EDUCATION/TRAINING PROGRAM
Payer: MEDICARE

## 2024-12-21 ENCOUNTER — APPOINTMENT (OUTPATIENT)
Dept: CARDIOLOGY | Facility: HOSPITAL | Age: 69
End: 2024-12-21
Payer: MEDICARE

## 2024-12-21 ENCOUNTER — APPOINTMENT (OUTPATIENT)
Dept: RADIOLOGY | Facility: HOSPITAL | Age: 69
End: 2024-12-21
Payer: MEDICARE

## 2024-12-21 DIAGNOSIS — R55 SYNCOPE, UNSPECIFIED SYNCOPE TYPE: Primary | ICD-10-CM

## 2024-12-21 DIAGNOSIS — N17.9 AKI (ACUTE KIDNEY INJURY) (CMS-HCC): ICD-10-CM

## 2024-12-21 LAB
BASOPHILS # BLD AUTO: 0.05 X10*3/UL (ref 0–0.1)
BASOPHILS NFR BLD AUTO: 0.8 %
EOSINOPHIL # BLD AUTO: 0.28 X10*3/UL (ref 0–0.7)
EOSINOPHIL NFR BLD AUTO: 4.4 %
ERYTHROCYTE [DISTWIDTH] IN BLOOD BY AUTOMATED COUNT: 13.2 % (ref 11.5–14.5)
HCT VFR BLD AUTO: 45.8 % (ref 41–52)
HGB BLD-MCNC: 15.4 G/DL (ref 13.5–17.5)
IMM GRANULOCYTES # BLD AUTO: 0.01 X10*3/UL (ref 0–0.7)
IMM GRANULOCYTES NFR BLD AUTO: 0.2 % (ref 0–0.9)
LYMPHOCYTES # BLD AUTO: 2.26 X10*3/UL (ref 1.2–4.8)
LYMPHOCYTES NFR BLD AUTO: 35.8 %
MCH RBC QN AUTO: 31.2 PG (ref 26–34)
MCHC RBC AUTO-ENTMCNC: 33.6 G/DL (ref 32–36)
MCV RBC AUTO: 93 FL (ref 80–100)
MONOCYTES # BLD AUTO: 0.46 X10*3/UL (ref 0.1–1)
MONOCYTES NFR BLD AUTO: 7.3 %
NEUTROPHILS # BLD AUTO: 3.25 X10*3/UL (ref 1.2–7.7)
NEUTROPHILS NFR BLD AUTO: 51.5 %
NRBC BLD-RTO: 0 /100 WBCS (ref 0–0)
PLATELET # BLD AUTO: 170 X10*3/UL (ref 150–450)
RBC # BLD AUTO: 4.93 X10*6/UL (ref 4.5–5.9)
WBC # BLD AUTO: 6.3 X10*3/UL (ref 4.4–11.3)

## 2024-12-21 PROCEDURE — 71045 X-RAY EXAM CHEST 1 VIEW: CPT

## 2024-12-21 PROCEDURE — 93005 ELECTROCARDIOGRAM TRACING: CPT

## 2024-12-21 PROCEDURE — 83880 ASSAY OF NATRIURETIC PEPTIDE: CPT | Performed by: STUDENT IN AN ORGANIZED HEALTH CARE EDUCATION/TRAINING PROGRAM

## 2024-12-21 PROCEDURE — 84484 ASSAY OF TROPONIN QUANT: CPT | Performed by: STUDENT IN AN ORGANIZED HEALTH CARE EDUCATION/TRAINING PROGRAM

## 2024-12-21 PROCEDURE — 83735 ASSAY OF MAGNESIUM: CPT | Performed by: STUDENT IN AN ORGANIZED HEALTH CARE EDUCATION/TRAINING PROGRAM

## 2024-12-21 PROCEDURE — 36415 COLL VENOUS BLD VENIPUNCTURE: CPT | Performed by: STUDENT IN AN ORGANIZED HEALTH CARE EDUCATION/TRAINING PROGRAM

## 2024-12-21 PROCEDURE — 80053 COMPREHEN METABOLIC PANEL: CPT | Performed by: STUDENT IN AN ORGANIZED HEALTH CARE EDUCATION/TRAINING PROGRAM

## 2024-12-21 PROCEDURE — 99285 EMERGENCY DEPT VISIT HI MDM: CPT | Performed by: STUDENT IN AN ORGANIZED HEALTH CARE EDUCATION/TRAINING PROGRAM

## 2024-12-21 PROCEDURE — 85025 COMPLETE CBC W/AUTO DIFF WBC: CPT | Performed by: STUDENT IN AN ORGANIZED HEALTH CARE EDUCATION/TRAINING PROGRAM

## 2024-12-21 PROCEDURE — 71045 X-RAY EXAM CHEST 1 VIEW: CPT | Performed by: SURGERY

## 2024-12-21 PROCEDURE — 36600 WITHDRAWAL OF ARTERIAL BLOOD: CPT

## 2024-12-21 PROCEDURE — 85610 PROTHROMBIN TIME: CPT | Performed by: STUDENT IN AN ORGANIZED HEALTH CARE EDUCATION/TRAINING PROGRAM

## 2024-12-21 PROCEDURE — 82810 BLOOD GASES O2 SAT ONLY: CPT | Mod: CCI | Performed by: STUDENT IN AN ORGANIZED HEALTH CARE EDUCATION/TRAINING PROGRAM

## 2024-12-21 ASSESSMENT — PAIN SCALES - GENERAL: PAINLEVEL_OUTOF10: 0 - NO PAIN

## 2024-12-21 ASSESSMENT — PAIN - FUNCTIONAL ASSESSMENT: PAIN_FUNCTIONAL_ASSESSMENT: 0-10

## 2024-12-21 ASSESSMENT — COLUMBIA-SUICIDE SEVERITY RATING SCALE - C-SSRS
1. IN THE PAST MONTH, HAVE YOU WISHED YOU WERE DEAD OR WISHED YOU COULD GO TO SLEEP AND NOT WAKE UP?: NO
2. HAVE YOU ACTUALLY HAD ANY THOUGHTS OF KILLING YOURSELF?: NO
6. HAVE YOU EVER DONE ANYTHING, STARTED TO DO ANYTHING, OR PREPARED TO DO ANYTHING TO END YOUR LIFE?: NO

## 2024-12-22 ENCOUNTER — APPOINTMENT (OUTPATIENT)
Dept: RADIOLOGY | Facility: HOSPITAL | Age: 69
End: 2024-12-22
Payer: MEDICARE

## 2024-12-22 VITALS
DIASTOLIC BLOOD PRESSURE: 72 MMHG | TEMPERATURE: 97.3 F | WEIGHT: 180 LBS | BODY MASS INDEX: 31.89 KG/M2 | RESPIRATION RATE: 16 BRPM | OXYGEN SATURATION: 97 % | HEIGHT: 63 IN | SYSTOLIC BLOOD PRESSURE: 120 MMHG | HEART RATE: 69 BPM

## 2024-12-22 PROBLEM — R55 SYNCOPE, UNSPECIFIED SYNCOPE TYPE: Status: ACTIVE | Noted: 2024-12-22

## 2024-12-22 LAB
ALBUMIN SERPL BCP-MCNC: 4.5 G/DL (ref 3.4–5)
ALP SERPL-CCNC: 66 U/L (ref 33–136)
ALT SERPL W P-5'-P-CCNC: 18 U/L (ref 10–52)
AMPHETAMINES UR QL SCN: NORMAL
ANION GAP SERPL CALC-SCNC: 13 MMOL/L (ref 10–20)
ANION GAP SERPL CALC-SCNC: 9 MMOL/L (ref 10–20)
ARTERIAL PATENCY WRIST A: POSITIVE
AST SERPL W P-5'-P-CCNC: 17 U/L (ref 9–39)
BARBITURATES UR QL SCN: NORMAL
BASE EXCESS BLDA CALC-SCNC: -1.6 MMOL/L (ref -2–3)
BENZODIAZ UR QL SCN: NORMAL
BILIRUB SERPL-MCNC: 0.5 MG/DL (ref 0–1.2)
BNP SERPL-MCNC: 54 PG/ML (ref 0–99)
BODY TEMPERATURE: 37 DEGREES CELSIUS
BUN SERPL-MCNC: 25 MG/DL (ref 6–23)
BUN SERPL-MCNC: 27 MG/DL (ref 6–23)
BZE UR QL SCN: NORMAL
CALCIUM SERPL-MCNC: 8.7 MG/DL (ref 8.6–10.3)
CALCIUM SERPL-MCNC: 8.9 MG/DL (ref 8.6–10.3)
CANNABINOIDS UR QL SCN: NORMAL
CARDIAC TROPONIN I PNL SERPL HS: 3 NG/L (ref 0–20)
CARDIAC TROPONIN I PNL SERPL HS: 3 NG/L (ref 0–20)
CARDIAC TROPONIN I PNL SERPL HS: 4 NG/L (ref 0–20)
CHLORIDE SERPL-SCNC: 106 MMOL/L (ref 98–107)
CHLORIDE SERPL-SCNC: 107 MMOL/L (ref 98–107)
CO2 SERPL-SCNC: 23 MMOL/L (ref 21–32)
CO2 SERPL-SCNC: 28 MMOL/L (ref 21–32)
COHGB MFR BLDA: 1.4 %
CREAT SERPL-MCNC: 1.8 MG/DL (ref 0.5–1.3)
CREAT SERPL-MCNC: 1.9 MG/DL (ref 0.5–1.3)
DO-HGB MFR BLDA: 0.7 % (ref 0–5)
EGFRCR SERPLBLD CKD-EPI 2021: 38 ML/MIN/1.73M*2
EGFRCR SERPLBLD CKD-EPI 2021: 40 ML/MIN/1.73M*2
ERYTHROCYTE [DISTWIDTH] IN BLOOD BY AUTOMATED COUNT: 13.2 % (ref 11.5–14.5)
FENTANYL+NORFENTANYL UR QL SCN: NORMAL
GLUCOSE SERPL-MCNC: 116 MG/DL (ref 74–99)
GLUCOSE SERPL-MCNC: 87 MG/DL (ref 74–99)
HCO3 BLDA-SCNC: 21.9 MMOL/L (ref 22–26)
HCT VFR BLD AUTO: 41.5 % (ref 41–52)
HGB BLD-MCNC: 13.9 G/DL (ref 13.5–17.5)
HGB BLDA-MCNC: 14.8 G/DL (ref 13.5–17.5)
INHALED O2 CONCENTRATION: 21 %
INR PPP: 1 (ref 0.9–1.1)
MAGNESIUM SERPL-MCNC: 2.05 MG/DL (ref 1.6–2.4)
MCH RBC QN AUTO: 31.2 PG (ref 26–34)
MCHC RBC AUTO-ENTMCNC: 33.5 G/DL (ref 32–36)
MCV RBC AUTO: 93 FL (ref 80–100)
METHADONE UR QL SCN: NORMAL
METHGB MFR BLDA: 0.4 % (ref 0–1.5)
NRBC BLD-RTO: 0 /100 WBCS (ref 0–0)
OPIATES UR QL SCN: NORMAL
OXYCODONE+OXYMORPHONE UR QL SCN: NORMAL
OXYHGB MFR BLDA: 97.4 % (ref 94–98)
PCO2 BLDA: 33 MM HG (ref 38–42)
PCP UR QL SCN: NORMAL
PH BLDA: 7.43 PH (ref 7.38–7.42)
PLATELET # BLD AUTO: 143 X10*3/UL (ref 150–450)
PO2 BLDA: 106 MM HG (ref 85–95)
POTASSIUM SERPL-SCNC: 4 MMOL/L (ref 3.5–5.3)
POTASSIUM SERPL-SCNC: 4.2 MMOL/L (ref 3.5–5.3)
PROT SERPL-MCNC: 7.1 G/DL (ref 6.4–8.2)
PROTHROMBIN TIME: 11.4 SECONDS (ref 9.8–12.8)
RBC # BLD AUTO: 4.45 X10*6/UL (ref 4.5–5.9)
SAO2 % BLDA: 99 % (ref 94–100)
SODIUM SERPL-SCNC: 138 MMOL/L (ref 136–145)
SODIUM SERPL-SCNC: 140 MMOL/L (ref 136–145)
SPECIMEN DRAWN FROM PATIENT: ABNORMAL
WBC # BLD AUTO: 6.3 X10*3/UL (ref 4.4–11.3)

## 2024-12-22 PROCEDURE — 2500000001 HC RX 250 WO HCPCS SELF ADMINISTERED DRUGS (ALT 637 FOR MEDICARE OP)

## 2024-12-22 PROCEDURE — 36415 COLL VENOUS BLD VENIPUNCTURE: CPT | Performed by: STUDENT IN AN ORGANIZED HEALTH CARE EDUCATION/TRAINING PROGRAM

## 2024-12-22 PROCEDURE — 96372 THER/PROPH/DIAG INJ SC/IM: CPT | Performed by: NURSE PRACTITIONER

## 2024-12-22 PROCEDURE — 70450 CT HEAD/BRAIN W/O DYE: CPT

## 2024-12-22 PROCEDURE — G0378 HOSPITAL OBSERVATION PER HR: HCPCS

## 2024-12-22 PROCEDURE — 36415 COLL VENOUS BLD VENIPUNCTURE: CPT | Performed by: NURSE PRACTITIONER

## 2024-12-22 PROCEDURE — 94660 CPAP INITIATION&MGMT: CPT

## 2024-12-22 PROCEDURE — 70450 CT HEAD/BRAIN W/O DYE: CPT | Performed by: RADIOLOGY

## 2024-12-22 PROCEDURE — 80307 DRUG TEST PRSMV CHEM ANLYZR: CPT | Performed by: INTERNAL MEDICINE

## 2024-12-22 PROCEDURE — 96360 HYDRATION IV INFUSION INIT: CPT

## 2024-12-22 PROCEDURE — 99222 1ST HOSP IP/OBS MODERATE 55: CPT | Performed by: INTERNAL MEDICINE

## 2024-12-22 PROCEDURE — 2500000004 HC RX 250 GENERAL PHARMACY W/ HCPCS (ALT 636 FOR OP/ED): Performed by: NURSE PRACTITIONER

## 2024-12-22 PROCEDURE — 85027 COMPLETE CBC AUTOMATED: CPT | Performed by: NURSE PRACTITIONER

## 2024-12-22 PROCEDURE — 2500000004 HC RX 250 GENERAL PHARMACY W/ HCPCS (ALT 636 FOR OP/ED): Performed by: STUDENT IN AN ORGANIZED HEALTH CARE EDUCATION/TRAINING PROGRAM

## 2024-12-22 PROCEDURE — 80048 BASIC METABOLIC PNL TOTAL CA: CPT | Performed by: NURSE PRACTITIONER

## 2024-12-22 PROCEDURE — 2500000001 HC RX 250 WO HCPCS SELF ADMINISTERED DRUGS (ALT 637 FOR MEDICARE OP): Performed by: INTERNAL MEDICINE

## 2024-12-22 PROCEDURE — 84484 ASSAY OF TROPONIN QUANT: CPT | Performed by: NURSE PRACTITIONER

## 2024-12-22 PROCEDURE — 96361 HYDRATE IV INFUSION ADD-ON: CPT

## 2024-12-22 PROCEDURE — 84484 ASSAY OF TROPONIN QUANT: CPT | Performed by: STUDENT IN AN ORGANIZED HEALTH CARE EDUCATION/TRAINING PROGRAM

## 2024-12-22 RX ORDER — HEPARIN SODIUM 5000 [USP'U]/ML
5000 INJECTION, SOLUTION INTRAVENOUS; SUBCUTANEOUS EVERY 8 HOURS
Status: DISCONTINUED | OUTPATIENT
Start: 2024-12-22 | End: 2024-12-23 | Stop reason: HOSPADM

## 2024-12-22 RX ORDER — ACETAMINOPHEN 325 MG/1
650 TABLET ORAL EVERY 4 HOURS PRN
Status: DISCONTINUED | OUTPATIENT
Start: 2024-12-22 | End: 2024-12-23 | Stop reason: HOSPADM

## 2024-12-22 RX ORDER — GABAPENTIN 300 MG/1
300 CAPSULE ORAL 2 TIMES DAILY
Status: DISCONTINUED | OUTPATIENT
Start: 2024-12-22 | End: 2024-12-23 | Stop reason: HOSPADM

## 2024-12-22 RX ORDER — AMLODIPINE BESYLATE 5 MG/1
5 TABLET ORAL DAILY
Status: DISCONTINUED | OUTPATIENT
Start: 2024-12-22 | End: 2024-12-23 | Stop reason: HOSPADM

## 2024-12-22 RX ORDER — CITALOPRAM 20 MG/1
40 TABLET, FILM COATED ORAL DAILY
Status: DISCONTINUED | OUTPATIENT
Start: 2024-12-22 | End: 2024-12-23 | Stop reason: HOSPADM

## 2024-12-22 RX ORDER — BUPROPION HYDROCHLORIDE 150 MG/1
150 TABLET, EXTENDED RELEASE ORAL 2 TIMES DAILY
Status: DISCONTINUED | OUTPATIENT
Start: 2024-12-22 | End: 2024-12-23 | Stop reason: HOSPADM

## 2024-12-22 RX ORDER — ATORVASTATIN CALCIUM 80 MG/1
80 TABLET, FILM COATED ORAL DAILY
Status: DISCONTINUED | OUTPATIENT
Start: 2024-12-22 | End: 2024-12-23 | Stop reason: HOSPADM

## 2024-12-22 RX ORDER — METOPROLOL SUCCINATE 25 MG/1
25 TABLET, EXTENDED RELEASE ORAL DAILY
Status: DISCONTINUED | OUTPATIENT
Start: 2024-12-22 | End: 2024-12-23 | Stop reason: HOSPADM

## 2024-12-22 RX ADMIN — CITALOPRAM HYDROBROMIDE 40 MG: 20 TABLET ORAL at 13:29

## 2024-12-22 RX ADMIN — BUPROPION HYDROCHLORIDE 150 MG: 150 TABLET, FILM COATED, EXTENDED RELEASE ORAL at 21:01

## 2024-12-22 RX ADMIN — HEPARIN SODIUM 5000 UNITS: 5000 INJECTION INTRAVENOUS; SUBCUTANEOUS at 06:33

## 2024-12-22 RX ADMIN — SODIUM CHLORIDE, POTASSIUM CHLORIDE, SODIUM LACTATE AND CALCIUM CHLORIDE 1000 ML: 600; 310; 30; 20 INJECTION, SOLUTION INTRAVENOUS at 00:51

## 2024-12-22 RX ADMIN — GABAPENTIN 300 MG: 300 CAPSULE ORAL at 13:29

## 2024-12-22 RX ADMIN — BUPROPION HYDROCHLORIDE 150 MG: 150 TABLET, FILM COATED, EXTENDED RELEASE ORAL at 14:39

## 2024-12-22 RX ADMIN — HEPARIN SODIUM 5000 UNITS: 5000 INJECTION INTRAVENOUS; SUBCUTANEOUS at 21:01

## 2024-12-22 RX ADMIN — METOPROLOL SUCCINATE 25 MG: 25 TABLET, EXTENDED RELEASE ORAL at 13:29

## 2024-12-22 RX ADMIN — GABAPENTIN 300 MG: 300 CAPSULE ORAL at 21:01

## 2024-12-22 RX ADMIN — ACETAMINOPHEN 650 MG: 325 TABLET, FILM COATED ORAL at 21:56

## 2024-12-22 RX ADMIN — ATORVASTATIN CALCIUM 80 MG: 80 TABLET, FILM COATED ORAL at 13:28

## 2024-12-22 RX ADMIN — HEPARIN SODIUM 5000 UNITS: 5000 INJECTION INTRAVENOUS; SUBCUTANEOUS at 13:28

## 2024-12-22 RX ADMIN — AMLODIPINE BESYLATE 5 MG: 5 TABLET ORAL at 13:29

## 2024-12-22 SDOH — SOCIAL STABILITY: SOCIAL INSECURITY
WITHIN THE LAST YEAR, HAVE YOU BEEN KICKED, HIT, SLAPPED, OR OTHERWISE PHYSICALLY HURT BY YOUR PARTNER OR EX-PARTNER?: NO

## 2024-12-22 SDOH — SOCIAL STABILITY: SOCIAL INSECURITY: ABUSE: ADULT

## 2024-12-22 SDOH — ECONOMIC STABILITY: INCOME INSECURITY: IN THE PAST 12 MONTHS HAS THE ELECTRIC, GAS, OIL, OR WATER COMPANY THREATENED TO SHUT OFF SERVICES IN YOUR HOME?: NO

## 2024-12-22 SDOH — SOCIAL STABILITY: SOCIAL INSECURITY: WERE YOU ABLE TO COMPLETE ALL THE BEHAVIORAL HEALTH SCREENINGS?: YES

## 2024-12-22 SDOH — SOCIAL STABILITY: SOCIAL INSECURITY: DO YOU FEEL ANYONE HAS EXPLOITED OR TAKEN ADVANTAGE OF YOU FINANCIALLY OR OF YOUR PERSONAL PROPERTY?: NO

## 2024-12-22 SDOH — SOCIAL STABILITY: SOCIAL INSECURITY: HAVE YOU HAD ANY THOUGHTS OF HARMING ANYONE ELSE?: NO

## 2024-12-22 SDOH — SOCIAL STABILITY: SOCIAL INSECURITY: WITHIN THE LAST YEAR, HAVE YOU BEEN AFRAID OF YOUR PARTNER OR EX-PARTNER?: NO

## 2024-12-22 SDOH — SOCIAL STABILITY: SOCIAL INSECURITY
WITHIN THE LAST YEAR, HAVE YOU BEEN RAPED OR FORCED TO HAVE ANY KIND OF SEXUAL ACTIVITY BY YOUR PARTNER OR EX-PARTNER?: NO

## 2024-12-22 SDOH — SOCIAL STABILITY: SOCIAL INSECURITY: HAS ANYONE EVER THREATENED TO HURT YOUR FAMILY OR YOUR PETS?: NO

## 2024-12-22 SDOH — SOCIAL STABILITY: SOCIAL INSECURITY: DOES ANYONE TRY TO KEEP YOU FROM HAVING/CONTACTING OTHER FRIENDS OR DOING THINGS OUTSIDE YOUR HOME?: NO

## 2024-12-22 SDOH — SOCIAL STABILITY: SOCIAL INSECURITY: ARE YOU OR HAVE YOU BEEN THREATENED OR ABUSED PHYSICALLY, EMOTIONALLY, OR SEXUALLY BY ANYONE?: NO

## 2024-12-22 SDOH — SOCIAL STABILITY: SOCIAL INSECURITY: WITHIN THE LAST YEAR, HAVE YOU BEEN HUMILIATED OR EMOTIONALLY ABUSED IN OTHER WAYS BY YOUR PARTNER OR EX-PARTNER?: NO

## 2024-12-22 SDOH — SOCIAL STABILITY: SOCIAL INSECURITY: HAVE YOU HAD THOUGHTS OF HARMING ANYONE ELSE?: NO

## 2024-12-22 SDOH — SOCIAL STABILITY: SOCIAL INSECURITY: ARE THERE ANY APPARENT SIGNS OF INJURIES/BEHAVIORS THAT COULD BE RELATED TO ABUSE/NEGLECT?: NO

## 2024-12-22 SDOH — SOCIAL STABILITY: SOCIAL INSECURITY: DO YOU FEEL UNSAFE GOING BACK TO THE PLACE WHERE YOU ARE LIVING?: NO

## 2024-12-22 ASSESSMENT — COGNITIVE AND FUNCTIONAL STATUS - GENERAL
DAILY ACTIVITIY SCORE: 24
MOBILITY SCORE: 24
DAILY ACTIVITIY SCORE: 24
MOBILITY SCORE: 24
MOBILITY SCORE: 24
PATIENT BASELINE BEDBOUND: NO

## 2024-12-22 ASSESSMENT — LIFESTYLE VARIABLES
SUBSTANCE_ABUSE_PAST_12_MONTHS: NO
HOW OFTEN DO YOU HAVE 6 OR MORE DRINKS ON ONE OCCASION: NEVER
SKIP TO QUESTIONS 9-10: 1
HOW OFTEN DO YOU HAVE A DRINK CONTAINING ALCOHOL: NEVER
AUDIT-C TOTAL SCORE: 0
AUDIT-C TOTAL SCORE: 0
PRESCIPTION_ABUSE_PAST_12_MONTHS: NO
HOW MANY STANDARD DRINKS CONTAINING ALCOHOL DO YOU HAVE ON A TYPICAL DAY: PATIENT DOES NOT DRINK

## 2024-12-22 ASSESSMENT — ACTIVITIES OF DAILY LIVING (ADL)
HEARING - RIGHT EAR: FUNCTIONAL
TOILETING: INDEPENDENT
GROOMING: INDEPENDENT
JUDGMENT_ADEQUATE_SAFELY_COMPLETE_DAILY_ACTIVITIES: YES
DRESSING YOURSELF: INDEPENDENT
ADEQUATE_TO_COMPLETE_ADL: YES
BATHING: INDEPENDENT
PATIENT'S MEMORY ADEQUATE TO SAFELY COMPLETE DAILY ACTIVITIES?: YES
FEEDING YOURSELF: INDEPENDENT
WALKS IN HOME: INDEPENDENT
LACK_OF_TRANSPORTATION: NO
HEARING - LEFT EAR: FUNCTIONAL

## 2024-12-22 ASSESSMENT — PATIENT HEALTH QUESTIONNAIRE - PHQ9
SUM OF ALL RESPONSES TO PHQ9 QUESTIONS 1 & 2: 0
1. LITTLE INTEREST OR PLEASURE IN DOING THINGS: NOT AT ALL
2. FEELING DOWN, DEPRESSED OR HOPELESS: NOT AT ALL

## 2024-12-22 ASSESSMENT — PAIN SCALES - GENERAL
PAINLEVEL_OUTOF10: 0 - NO PAIN
PAINLEVEL_OUTOF10: 2
PAINLEVEL_OUTOF10: 0 - NO PAIN

## 2024-12-22 ASSESSMENT — PAIN DESCRIPTION - LOCATION: LOCATION: HEAD

## 2024-12-22 ASSESSMENT — PAIN SCALES - WONG BAKER: WONGBAKER_NUMERICALRESPONSE: NO HURT

## 2024-12-22 ASSESSMENT — PAIN - FUNCTIONAL ASSESSMENT: PAIN_FUNCTIONAL_ASSESSMENT: 0-10

## 2024-12-22 NOTE — CARE PLAN
The patient's goals for the shift include  maintain safety     The clinical goals for the shift include Patient will remain safe and report decreased syncope

## 2024-12-22 NOTE — ED PROVIDER NOTES
EMERGENCY DEPARTMENT ENCOUNTER      Pt Name: Marnie Jha  MRN: [id number]  Birthdate 1955  Date of evaluation: 12/21/2024  Provider: Meme Castillo DO         Chief Complaint   Patient presents with    Dizziness    SLEEP ISSUES       HPI     69-year-old male with a past medical history significant for sick sinus syndrome, with atrial patient in place who presents to the emergency department with intermittent episodes of syncope.  Patient states that sometimes he will have warning and sometimes he will not, but he will become lightheaded, dizzy, and lose consciousness, if he is sitting talking to somebody he will pass out, if he is sitting at the dining table, he will face plant into his plate.  He has been worked up recently for sleep apnea, found to have a moderate amount of sleep apnea but has been unable to figure out how to set up his home CPAP machine, his primary concern is whether or not he will lose his 's license as he requires this for work.  This been going on for several months, but today he presents to the ER for it because its happened every day for the last few days.  Is not associated with episodes of fever or sweating or nausea.  And patient states that sometimes he does not get any warning that it is going to happen.              Patient History   Past Medical History:   Diagnosis Date    Personal history of other diseases of the circulatory system     History of cardiac disorder    Personal history of other specified conditions     History of chest pain     Past Surgical History:   Procedure Laterality Date    CT ANGIO NECK  3/19/2022    CT NECK ANGIO W AND WO IV CONTRAST 3/19/2022 [facility]    CT HEAD ANGIO W AND WO IV CONTRAST  3/19/2022    CT HEAD ANGIO W AND WO IV CONTRAST 3/19/2022 [facility]    OTHER SURGICAL HISTORY  01/17/2020    Cardiac catheterization    OTHER SURGICAL HISTORY  01/17/2020    Heart surgery    OTHER SURGICAL HISTORY  07/30/2021     Laparoscopic sigmoidectomy     Family History   Problem Relation Name Age of Onset    Hypertension Mother      Hypertension Father      Other (cardiac disorder) Father      Coronary artery disease Father       Social History     Tobacco Use    Smoking status: Never    Smokeless tobacco: Never   Vaping Use    Vaping status: Never Used   Substance Use Topics    Alcohol use: Not Currently    Drug use: Not Currently       Physical Exam   ED Triage Vitals [12/21/24 2239]   Temperature Heart Rate Respirations BP   36.3 °C (97.3 °F) 81 16 (!) 143/92      Pulse Ox Temp Source Heart Rate Source Patient Position   100 % Temporal Monitor Sitting      BP Location FiO2 (%)     Right arm --       Physical Exam  Vitals and nursing note reviewed.   Constitutional:       General: He is not in acute distress.     Appearance: He is well-developed.   HENT:      Head: Normocephalic and atraumatic.   Eyes:      Conjunctiva/sclera: Conjunctivae normal.   Cardiovascular:      Rate and Rhythm: Normal rate and regular rhythm.      Heart sounds: No murmur heard.  Pulmonary:      Effort: Pulmonary effort is normal. No respiratory distress.      Breath sounds: Normal breath sounds.   Abdominal:      Palpations: Abdomen is soft.      Tenderness: There is no abdominal tenderness.   Musculoskeletal:         General: No swelling.      Cervical back: Neck supple.   Skin:     General: Skin is warm and dry.      Capillary Refill: Capillary refill takes less than 2 seconds.   Neurological:      Mental Status: He is alert.   Psychiatric:         Mood and Affect: Mood normal.         Results:  Abnormal Labs Reviewed   BLOOD GAS ARTERIAL, COOX - Abnormal; Notable for the following components:       Result Value    POCT pH, Arterial 7.43 (*)     POCT pCO2, Arterial 33 (*)     POCT pO2, Arterial 106 (*)     POCT HCO3 Calculated, Arterial 21.9 (*)     All other components within normal limits   COMPREHENSIVE METABOLIC PANEL - Abnormal; Notable for the  following components:    Urea Nitrogen 27 (*)     Creatinine 1.90 (*)     eGFR 38 (*)     All other components within normal limits       All other labs were normal or not returned as of the time of this dictation.     CT head wo IV contrast   Final Result   No acute intracranial hemorrhage, mass effect or midline shift.        Nonspecific scattered white matter hypodensities favored to represent   sequela of small vessel ischemia.             MACRO:   None.        Signed by: Evan Finkelstein 12/22/2024 12:47 AM   Dictation workstation:   [id number]      XR chest 1 view   Final Result   1.  No definite evidence of acute cardiopulmonary abnormality.                  MACRO:   None        Signed by: Braxton Hale 12/21/2024 11:38 PM   Dictation workstation:   [id number]      Cardiac Device Check - Remote    (Results Pending)         ED Course & MDM   Marnie Jha is a 69 y.o. male presenting to the ED for evaluation of had concerns including Dizziness and SLEEP ISSUES.. External records reviewed: I reviewed external records including outpatient, PCP records, and prior discharge summaries.    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   Presents to the emergency department with intermittent syncopal episodes in the last several months, that have been daily for the last several days.  Pacer interrogated.  It has not identified any arrhythmic episodes.  Cardiac workup initiated.  Last stress test several weeks ago, with an EF of 71%.  Coox is normal, however patient has JOE.  Given his history of prior cancer, and the unexplained reason for the symptoms including the description of the sudden attacks where he is asleep to the point where he will fall asleep with his face and his food, this is concerning finding, especially if the patient is still driving.  He  will be admitted for further evaluation and management        ED Course as of 12/22/24 0328   Sat Dec 21, 2024   2333 EKG performed at 2242 and read by me shows atrial paced rhythm, 71 bpm, with normal NH interval, normal QRS, QTc of 446, slightly wandering baseline.  T wave inversions in the anterior leads not changed from prior performed in November of this year [AS]   Sun Dec 22, 2024   0026 POCT Carboxyhemoglobin, Arterial: 1.4 [AS]   0031 Creatinine(!): 1.90 [AS]      ED Course User Index  [AS] Meme Castillo DO         Diagnoses as of 12/22/24 0328   Syncope, unspecified syncope type   JOE (acute kidney injury) (CMS-[facility])           Procedure  Procedures            Meme Castillo DO  Emergency Medicine    The above documentation was completed with the use of speech recognition software. It may contain dictation errors secondary to limitations of the software.      ED Medications administered this visit:    Medications   heparin (porcine) injection 5,000 Units (has no administration in time range)   lactated Ringer's bolus 1,000 mL (0 mL intravenous Stopped 12/22/24 0228)       New Prescriptions from this visit:    Current Discharge Medication List          Final Impression:   1. Syncope, unspecified syncope type    2. JOE (acute kidney injury) (CMS-[facility])          (Please note that portions of this note were completed with a voice recognition program.  Efforts were made to edit the dictations but occasionally words are mis-transcribed.)     Meme Castillo DO  12/22/24 0328

## 2024-12-22 NOTE — CARE PLAN
The patient's goals for the shift include      The clinical goals for the shift include patient safety, no falls/syncope      Problem: Pain - Adult  Goal: Verbalizes/displays adequate comfort level or baseline comfort level  Outcome: Met     Problem: Safety - Adult  Goal: Free from fall injury  Outcome: Progressing     Problem: Discharge Planning  Goal: Discharge to home or other facility with appropriate resources  Outcome: Progressing     Problem: Chronic Conditions and Co-morbidities  Goal: Patient's chronic conditions and co-morbidity symptoms are monitored and maintained or improved  Outcome: Progressing

## 2024-12-22 NOTE — H&P
History Of Present Illness  Marnie Jha is a 69 y.o. male presenting to the emergency department for evaluation of intermittent episodes of syncope.  Patient states that often he will have warning and sometimes he will not but he has more frequently become lightheaded, dizzy, and states that he passes out.  Patient states that most recently he was sitting at the dining table and face planted into his plate.  Patient states that he followed up with his PCP, Dr. Benavidez and was worked up for sleep apnea and found to require a CPAP machine.  Patient states he has not quite figured out how to set up his CPAP machine at home yet.  Patient states that his primary concern is whether or not he will lose his 's license as he requires this for work.  Patient states that these episodes have been going on for the last several months but states he presents to the ER today for evaluation because it has happened every day for the last several days.  Patient denies chest pain, shortness of breath, recent illness, nausea, vomiting, diarrhea.  Patient denies any diaphoresis during these episodes.    In ED, BUN 27, creatinine 1.90, GFR 38.  Troponin negative x 2.  Patient given 1 L bolus of LR for his JOE.  All other labs unremarkable.  CT of the head completed and negative for acute process per radiology review.  Chest x-ray completed and negative for acute process per radiology review.  Vital signs within normal limits.  Patient had a recent stress echo on 11/19/2024 that showed an ejection fraction of 71%.  Patient follows with Dr. Méndez in cardiology, history of SSS s/p pacemaker.  Patient had his pacemaker interrogated and no abnormal findings.  Patient has had chronic shoulder pain and had a negative Sobeida scan on 11/19/2024.  EKG showing an atrial paced rhythm, no abnormalities per ED physician review.  RT placed on evaluation for CPAP.  Social work consult for help with CPAP set up at home.  Patient admitted to  "telemetry observation under the care of Dr. Benavidez will continue to follow.  I was asked to do H&P and place initial admission orders.  *Prior medical records reviewed       Past Medical History  MI, hypertension, hyperlipidemia, colon cancer/neuroendocrine tumor s/p gastrectomy with resection and chemotherapy, A-fib, SSS s/p pacemaker, emphysema, ROBERT  Surgical History  CABG x 4, pacemaker, laparoscopic cholecystectomy, laparoscopic sigmoidectomy, gastrectomy, cardiac catheterization       Social History  Never smoker, no drug use, no alcohol use  Family History  Hypertension, heart disease       Allergies  Demerol [meperidine]    Review of Systems  A 10 point review of systems was completed and negative except what is listed in HPI  Physical Exam  Constitutional:       Appearance: He is obese.   HENT:      Mouth/Throat:      Mouth: Mucous membranes are dry.      Pharynx: Oropharynx is clear.   Eyes:      Pupils: Pupils are equal, round, and reactive to light.   Cardiovascular:      Comments: Atrial paced rhythm  Pulmonary:      Effort: Pulmonary effort is normal.      Breath sounds: Normal breath sounds.   Abdominal:      General: Bowel sounds are normal.   Musculoskeletal:         General: Normal range of motion.      Cervical back: Normal range of motion.   Skin:     General: Skin is warm and dry.      Capillary Refill: Capillary refill takes less than 2 seconds.   Neurological:      General: No focal deficit present.      Mental Status: He is alert and oriented to person, place, and time.   Psychiatric:         Mood and Affect: Mood normal.          Last Recorded Vitals  Blood pressure (!) 134/94, pulse 71, temperature 36.3 °C (97.3 °F), temperature source Temporal, resp. rate 16, height 1.6 m (5' 3\"), weight 83.9 kg (185 lb), SpO2 96%.    Relevant Results  CT head wo IV contrast    Result Date: 12/22/2024  Interpreted By:  Finkelstein, Evan, STUDY: CT HEAD WO IV CONTRAST;  12/22/2024 12:38 am   INDICATION: " Signs/Symptoms:recurrent syncope and headaches, Hx colon cancer and peripancreatic NET.     COMPARISON: CT brain 08/26/2023   ACCESSION NUMBER(S): [id number]   ORDERING CLINICIAN: MAILE AZUL   TECHNIQUE: Axial noncontrast CT images of the head with coronal and sagittal reconstructions.   FINDINGS: EXTRACRANIAL SOFT TISSUES: Unremarkable.   CALVARIUM: No depressed skull fracture. No destructive osseous lesion.   PARANASAL SINUSES/MASTOIDS: The visualized paranasal sinuses and mastoid air cells are aerated.   HEMORRHAGE: No acute intracranial hemorrhage.   BRAIN PARENCHYMA: Gray-white matter interfaces are preserved. No mass effect or midline shift. There are nonspecific scattered white matter hypodensities.   VENTRICLES and EXTRA-AXIAL SPACES: Parenchymal atrophy with prominence of the ventricles and cortical sulci.   OTHER FINDINGS: There are calcifications within the cavernous carotids       No acute intracranial hemorrhage, mass effect or midline shift.   Nonspecific scattered white matter hypodensities favored to represent sequela of small vessel ischemia.     MACRO: None.   Signed by: Evan Finkelstein 12/22/2024 12:47 AM Dictation workstation:   [id number]    XR chest 1 view    Result Date: 12/21/2024  Interpreted By:  Braxton Hale, STUDY: XR CHEST 1 VIEW;  12/21/2024 11:30 pm   INDICATION: Signs/Symptoms:Intermittent syncope.     COMPARISON: 11/12/2023.   ACCESSION NUMBER(S): [id number]   ORDERING CLINICIAN: MAILE AZUL   FINDINGS: AP radiograph of the chest was provided.   Apical lordosis. Limited slightly by soft tissue attenuation factors.   Redemonstrated median sternotomy. Stably configured cardiac pacer   CARDIOMEDIASTINAL SILHOUETTE: Cardiac silhouette is enlarged, unchanged. Tortuous and atherosclerotic thoracic aorta.   LUNGS: Lungs appear clear. No pleural effusion or pneumothorax.   ABDOMEN: No remarkable upper abdominal findings.   BONES: No acute osseous changes.       1.  No definite  evidence of acute cardiopulmonary abnormality.       MACRO: None   Signed by: Braxton Hale 12/21/2024 11:38 PM Dictation workstation:   [id number]   Results for orders placed or performed during the hospital encounter of 12/21/24 (from the past 24 hours)   Protime-INR   Result Value Ref Range    Protime 11.4 9.8 - 12.8 seconds    INR 1.0 0.9 - 1.1   Magnesium   Result Value Ref Range    Magnesium 2.05 1.60 - 2.40 mg/dL   Comprehensive Metabolic Panel   Result Value Ref Range    Glucose 87 74 - 99 mg/dL    Sodium 138 136 - 145 mmol/L    Potassium 4.2 3.5 - 5.3 mmol/L    Chloride 106 98 - 107 mmol/L    Bicarbonate 23 21 - 32 mmol/L    Anion Gap 13 10 - 20 mmol/L    Urea Nitrogen 27 (H) 6 - 23 mg/dL    Creatinine 1.90 (H) 0.50 - 1.30 mg/dL    eGFR 38 (L) >60 mL/min/1.73m*2    Calcium 8.9 8.6 - 10.3 mg/dL    Albumin 4.5 3.4 - 5.0 g/dL    Alkaline Phosphatase 66 33 - 136 U/L    Total Protein 7.1 6.4 - 8.2 g/dL    AST 17 9 - 39 U/L    Bilirubin, Total 0.5 0.0 - 1.2 mg/dL    ALT 18 10 - 52 U/L   CBC and Auto Differential   Result Value Ref Range    WBC 6.3 4.4 - 11.3 x10*3/uL    nRBC 0.0 0.0 - 0.0 /100 WBCs    RBC 4.93 4.50 - 5.90 x10*6/uL    Hemoglobin 15.4 13.5 - 17.5 g/dL    Hematocrit 45.8 41.0 - 52.0 %    MCV 93 80 - 100 fL    MCH 31.2 26.0 - 34.0 pg    MCHC 33.6 32.0 - 36.0 g/dL    RDW 13.2 11.5 - 14.5 %    Platelets 170 150 - 450 x10*3/uL    Neutrophils % 51.5 40.0 - 80.0 %    Immature Granulocytes %, Automated 0.2 0.0 - 0.9 %    Lymphocytes % 35.8 13.0 - 44.0 %    Monocytes % 7.3 2.0 - 10.0 %    Eosinophils % 4.4 0.0 - 6.0 %    Basophils % 0.8 0.0 - 2.0 %    Neutrophils Absolute 3.25 1.20 - 7.70 x10*3/uL    Immature Granulocytes Absolute, Automated 0.01 0.00 - 0.70 x10*3/uL    Lymphocytes Absolute 2.26 1.20 - 4.80 x10*3/uL    Monocytes Absolute 0.46 0.10 - 1.00 x10*3/uL    Eosinophils Absolute 0.28 0.00 - 0.70 x10*3/uL    Basophils Absolute 0.05 0.00 - 0.10 x10*3/uL   B-Type Natriuretic Peptide   Result Value Ref  Range    BNP 54 0 - 99 pg/mL   Troponin I, High Sensitivity, Initial   Result Value Ref Range    Troponin I, High Sensitivity 3 0 - 20 ng/L   BLOOD GAS ARTERIAL, COOX   Result Value Ref Range    POCT pH, Arterial 7.43 (H) 7.38 - 7.42 pH    POCT pCO2, Arterial 33 (L) 38 - 42 mm Hg    POCT pO2, Arterial 106 (H) 85 - 95 mm Hg    POCT SO2, Arterial 99 94 - 100 %    POCT Oxy Hemoglobin, Arterial 97.4 94.0 - 98.0 %    POCT Base Excess, Arterial -1.6 -2.0 - 3.0 mmol/L    POCT HCO3 Calculated, Arterial 21.9 (L) 22.0 - 26.0 mmol/L    POCT Hemoglobin, Arterial 14.8 13.5 - 17.5 g/dL    POCT Carboxyhemoglobin, Arterial 1.4 %    POCT Methemoglobin, Arterial 0.4 0.0 - 1.5 %    POCT Deoxy Hemoglobin, Arterial 0.7 0.0 - 5.0 %    Patient Temperature 37.0 degrees Celsius    FiO2 21 %    Site of Arterial Puncture Radial Left     Vlad's Test Positive    Troponin, High Sensitivity, 1 Hour   Result Value Ref Range    Troponin I, High Sensitivity 3 0 - 20 ng/L       Assessment/Plan   Marnie is a 69-year-old male patient who is alert and oriented x 3 presenting to emergency department for evaluation of syncopal episodes.  Patient states that over the last several months he has had intermittent episodes of passing out.  Patient states that he has followed up with his PCP Dr. Benavidez and was recommended to have a sleep study.  Patient was found to have ROBERT and placed on CPAP but has not figured out how to set up his CPAP machine at home.  Patient became concerned because his job depends on him keeping his 's license and states that he has had an episode every day for the last several days where he has passed out at home.  Patient denies injury or trauma, imaging completed and negative for acute process.  Patient found to have an JOE in ED and given IV fluids.  RT on consult for CPAP, social work for help with CPAP  at home, admission for further medical management.    JOE/syncope/ROBERT  Admit to telemetry observation per   Ice  See imaging results above  Trend troponin  IV fluid bolus in ED  Repeat labs in a.m.  Telemetry monitoring  Social work consult for CPAP assistance at home  CPAP nightly (RT evaluation)  Attending to consider adding a sleep aid for nighttime    HTN/HLD/MI/A-fib/colon cancer/neuroendocrine tumor/emphysema/SSS/pacemaker  #Chronic conditions  Nursing to complete home med rec  Cardiac diet  Continue home medications when med rec is complete  Telemetry monitoring    DVT Ppx  SCDs  Heparin subcutaneous  Bathroom privileges with assistance      I spent 35 minutes in the professional and overall care of this patient.  Elizabeth Lema, APRN-CNP

## 2024-12-23 VITALS
HEART RATE: 70 BPM | WEIGHT: 180 LBS | HEIGHT: 63 IN | RESPIRATION RATE: 16 BRPM | DIASTOLIC BLOOD PRESSURE: 88 MMHG | TEMPERATURE: 97.7 F | SYSTOLIC BLOOD PRESSURE: 135 MMHG | BODY MASS INDEX: 31.89 KG/M2 | OXYGEN SATURATION: 97 %

## 2024-12-23 PROBLEM — R55 SYNCOPE, UNSPECIFIED SYNCOPE TYPE: Status: RESOLVED | Noted: 2024-12-22 | Resolved: 2024-12-23

## 2024-12-23 PROCEDURE — G0378 HOSPITAL OBSERVATION PER HR: HCPCS

## 2024-12-23 PROCEDURE — 99239 HOSP IP/OBS DSCHRG MGMT >30: CPT | Performed by: STUDENT IN AN ORGANIZED HEALTH CARE EDUCATION/TRAINING PROGRAM

## 2024-12-23 PROCEDURE — 2500000001 HC RX 250 WO HCPCS SELF ADMINISTERED DRUGS (ALT 637 FOR MEDICARE OP): Performed by: INTERNAL MEDICINE

## 2024-12-23 PROCEDURE — 2500000004 HC RX 250 GENERAL PHARMACY W/ HCPCS (ALT 636 FOR OP/ED): Performed by: NURSE PRACTITIONER

## 2024-12-23 PROCEDURE — 96372 THER/PROPH/DIAG INJ SC/IM: CPT | Performed by: NURSE PRACTITIONER

## 2024-12-23 RX ADMIN — AMLODIPINE BESYLATE 5 MG: 5 TABLET ORAL at 08:33

## 2024-12-23 RX ADMIN — ATORVASTATIN CALCIUM 80 MG: 80 TABLET, FILM COATED ORAL at 08:33

## 2024-12-23 RX ADMIN — METOPROLOL SUCCINATE 25 MG: 25 TABLET, EXTENDED RELEASE ORAL at 08:33

## 2024-12-23 RX ADMIN — HEPARIN SODIUM 5000 UNITS: 5000 INJECTION INTRAVENOUS; SUBCUTANEOUS at 06:12

## 2024-12-23 RX ADMIN — CITALOPRAM HYDROBROMIDE 40 MG: 20 TABLET ORAL at 08:33

## 2024-12-23 RX ADMIN — BUPROPION HYDROCHLORIDE 150 MG: 150 TABLET, FILM COATED, EXTENDED RELEASE ORAL at 08:33

## 2024-12-23 RX ADMIN — GABAPENTIN 300 MG: 300 CAPSULE ORAL at 08:33

## 2024-12-23 ASSESSMENT — PAIN SCALES - GENERAL: PAINLEVEL_OUTOF10: 0 - NO PAIN

## 2024-12-23 NOTE — CARE PLAN
The patient's goals for the shift include      The clinical goals for the shift include pt to use home CPAP in room tonight      Problem: Safety - Adult  Goal: Free from fall injury  12/22/2024 2301 by Laila Morton RN  Outcome: Progressing  12/22/2024 2301 by Laila Morton RN  Outcome: Progressing     Problem: Discharge Planning  Goal: Discharge to home or other facility with appropriate resources  12/22/2024 2301 by Lalia Morton RN  Outcome: Progressing  12/22/2024 2301 by Laila Morton RN  Outcome: Progressing     Problem: Chronic Conditions and Co-morbidities  Goal: Patient's chronic conditions and co-morbidity symptoms are monitored and maintained or improved  12/22/2024 2301 by Laila Morton RN  Outcome: Progressing  12/22/2024 2301 by Laila Morton RN  Outcome: Progressing     Problem: Fall/Injury  Goal: Not fall by end of shift  Outcome: Progressing  Goal: Be free from injury by end of the shift  Outcome: Progressing  Goal: Verbalize understanding of personal risk factors for fall in the hospital  Outcome: Progressing  Goal: Verbalize understanding of risk factor reduction measures to prevent injury from fall in the home  Outcome: Progressing

## 2024-12-23 NOTE — DISCHARGE SUMMARY
Discharge Diagnosis  Syncope, unspecified syncope type    Issues Requiring Follow-Up  syncope    Test Results Pending At Discharge  Pending Labs       No current pending labs.            Hospital Course   Marnie is a 69-year-old male patient who is alert and oriented x 3 presenting to emergency department for evaluation of syncopal episodes.  Patient states that over the last several months he has had intermittent episodes of passing out.  Patient states that he has followed up with his PCP Dr. Benavidez and was recommended to have a sleep study.  Patient was found to have ROBERT and placed on CPAP but has not figured out how to set up his CPAP machine at home.  Patient became concerned because his job depends on him keeping his 's license and states that he has had an episode every day for the last several days where he has passed out at home.  Patient denies injury or trauma, imaging completed and negative for acute process.  Patient found to have an JOE in ED and given IV fluids.  RT on consult for CPAP, social work for help with CPAP  at home, admission for further medical management.     JOE/syncope/ROBERT  Admit to telemetry observation per Dr. Benavidez  See imaging results above  Trend troponin  IV fluid bolus in ED  Repeat labs in a.m.  Telemetry monitoring  Social work consult for CPAP assistance at home  CPAP nightly (RT evaluation)  Attending to consider adding a sleep aid for nighttime     HTN/HLD/MI/A-fib/colon cancer/neuroendocrine tumor/emphysema/SSS/pacemaker  #Chronic conditions  Nursing to complete home med rec  Cardiac diet  Continue home medications when med rec is complete  Telemetry monitoring     DVT Ppx  SCDs  Heparin subcutaneous  Bathroom privileges with assistance     12/22: Patient to use home CPAP tonight, appreciate RT education    Pertinent Physical Exam At Time of Discharge  Physical Exam  Constitutional:       Appearance: Normal appearance.   HENT:      Head: Normocephalic and  atraumatic.      Right Ear: Tympanic membrane and ear canal normal.      Left Ear: Tympanic membrane and ear canal normal.      Mouth/Throat:      Mouth: Mucous membranes are moist.      Pharynx: Oropharynx is clear.   Eyes:      Extraocular Movements: Extraocular movements intact.      Conjunctiva/sclera: Conjunctivae normal.      Pupils: Pupils are equal, round, and reactive to light.   Cardiovascular:      Rate and Rhythm: Normal rate and regular rhythm.      Pulses: Normal pulses.      Heart sounds: Normal heart sounds.   Pulmonary:      Effort: Pulmonary effort is normal.      Breath sounds: Normal breath sounds.   Abdominal:      General: Abdomen is flat. Bowel sounds are normal.      Palpations: Abdomen is soft.   Musculoskeletal:         General: Normal range of motion.      Cervical back: Normal range of motion and neck supple.   Skin:     General: Skin is warm and dry.      Capillary Refill: Capillary refill takes 2 to 3 seconds.   Neurological:      General: No focal deficit present.      Mental Status: He is alert and oriented to person, place, and time. Mental status is at baseline.   Psychiatric:         Mood and Affect: Mood normal.         Behavior: Behavior normal.         Thought Content: Thought content normal.         Judgment: Judgment normal.         Home Medications     Medication List      CONTINUE taking these medications     amLODIPine 5 mg tablet; Commonly known as: Norvasc; TAKE ONE TABLET BY   MOUTH EVERY DAY   atorvastatin 80 mg tablet; Commonly known as: Lipitor; Take 1 tablet (80   mg) by mouth once daily.   buPROPion  mg 12 hr tablet; Commonly known as: Wellbutrin SR; TAKE   ONE TABLET BY MOUTH TWO TIMES A DAY . DO NOT CRUSH, CHEW OR SPLIT   citalopram 40 mg tablet; Commonly known as: CeleXA; Take 1 tablet (40   mg) by mouth once daily.   clonazePAM 1 mg tablet; Commonly known as: KlonoPIN; Take 1 tablet (1   mg) by mouth 2 times a day.   gabapentin 300 mg capsule; Commonly  known as: Neurontin; Take 1 capsule   (300 mg) by mouth 2 times a day.   lisinopril 20 mg tablet; TAKE ONE TABLET BY MOUTH EVERY DAY   metoprolol succinate XL 25 mg 24 hr tablet; Commonly known as:   Toprol-XL; TAKE ONE TABLET BY MOUTH EVERY DAY       Outpatient Follow-Up  Future Appointments   Date Time Provider Department Center   1/2/2025 10:00 AM Tre Benavidez DO [id number] [place]   2/4/2025  9:00 AM Jayant Pineda MD [id number] [place]   4/1/2025 10:30 AM Gianna Carranza PA-C [id number] [place]   6/3/2025  1:30 PM Bassem Méndez MD [id number] [place]   7/8/2025 10:30 AM PAR CT 1 [facility]   7/15/2025  1:30 PM Jaleel Segal MD [id number] [place]       Tre Benavidez DO

## 2024-12-23 NOTE — H&P
History Of Present Illness  Marnie Jha is a 69 y.o. male presenting to the emergency department for evaluation of intermittent episodes of syncope.  Patient states that often he will have warning and sometimes he will not but he has more frequently become lightheaded, dizzy, and states that he passes out.  Patient states that most recently he was sitting at the dining table and face planted into his plate.  Patient states that he followed up with his PCP, Dr. Benavidez and was worked up for sleep apnea and found to require a CPAP machine.  Patient states he has not quite figured out how to set up his CPAP machine at home yet.  Patient states that his primary concern is whether or not he will lose his 's license as he requires this for work.  Patient states that these episodes have been going on for the last several months but states he presents to the ER today for evaluation because it has happened every day for the last several days.  Patient denies chest pain, shortness of breath, recent illness, nausea, vomiting, diarrhea.  Patient denies any diaphoresis during these episodes.    In ED, BUN 27, creatinine 1.90, GFR 38.  Troponin negative x 2.  Patient given 1 L bolus of LR for his JOE.  All other labs unremarkable.  CT of the head completed and negative for acute process per radiology review.  Chest x-ray completed and negative for acute process per radiology review.  Vital signs within normal limits.  Patient had a recent stress echo on 11/19/2024 that showed an ejection fraction of 71%.  Patient follows with Dr. Méndez in cardiology, history of SSS s/p pacemaker.  Patient had his pacemaker interrogated and no abnormal findings.  Patient has had chronic shoulder pain and had a negative Sobeida scan on 11/19/2024.  EKG showing an atrial paced rhythm, no abnormalities per ED physician review.  RT placed on evaluation for CPAP.  Social work consult for help with CPAP set up at home.  Patient admitted to  "telemetry observation under the care of Dr. Benavidez will continue to follow.  I was asked to do H&P and place initial admission orders.  *Prior medical records reviewed       Past Medical History  MI, hypertension, hyperlipidemia, colon cancer/neuroendocrine tumor s/p gastrectomy with resection and chemotherapy, A-fib, SSS s/p pacemaker, emphysema, ROBERT  Surgical History  CABG x 4, pacemaker, laparoscopic cholecystectomy, laparoscopic sigmoidectomy, gastrectomy, cardiac catheterization       Social History  Never smoker, no drug use, no alcohol use  Family History  Hypertension, heart disease       Allergies  Demerol [meperidine]    Review of Systems  A 10 point review of systems was completed and negative except what is listed in HPI  Physical Exam  Constitutional:       Appearance: He is obese.   HENT:      Mouth/Throat:      Mouth: Mucous membranes are dry.      Pharynx: Oropharynx is clear.   Eyes:      Pupils: Pupils are equal, round, and reactive to light.   Cardiovascular:      Comments: Atrial paced rhythm  Pulmonary:      Effort: Pulmonary effort is normal.      Breath sounds: Normal breath sounds.   Abdominal:      General: Bowel sounds are normal.   Musculoskeletal:         General: Normal range of motion.      Cervical back: Normal range of motion.   Skin:     General: Skin is warm and dry.      Capillary Refill: Capillary refill takes less than 2 seconds.   Neurological:      General: No focal deficit present.      Mental Status: He is alert and oriented to person, place, and time.   Psychiatric:         Mood and Affect: Mood normal.          Last Recorded Vitals  Blood pressure 120/72, pulse 69, temperature 36.3 °C (97.3 °F), resp. rate 16, height 1.6 m (5' 2.99\"), weight 81.6 kg (180 lb), SpO2 97%.    Relevant Results  CT head wo IV contrast    Result Date: 12/22/2024  Interpreted By:  Finkelstein, Evan, STUDY: CT HEAD WO IV CONTRAST;  12/22/2024 12:38 am   INDICATION: Signs/Symptoms:recurrent syncope and " headaches, Hx colon cancer and peripancreatic NET.     COMPARISON: CT brain 08/26/2023   ACCESSION NUMBER(S): [id number]   ORDERING CLINICIAN: MAILE AZUL   TECHNIQUE: Axial noncontrast CT images of the head with coronal and sagittal reconstructions.   FINDINGS: EXTRACRANIAL SOFT TISSUES: Unremarkable.   CALVARIUM: No depressed skull fracture. No destructive osseous lesion.   PARANASAL SINUSES/MASTOIDS: The visualized paranasal sinuses and mastoid air cells are aerated.   HEMORRHAGE: No acute intracranial hemorrhage.   BRAIN PARENCHYMA: Gray-white matter interfaces are preserved. No mass effect or midline shift. There are nonspecific scattered white matter hypodensities.   VENTRICLES and EXTRA-AXIAL SPACES: Parenchymal atrophy with prominence of the ventricles and cortical sulci.   OTHER FINDINGS: There are calcifications within the cavernous carotids       No acute intracranial hemorrhage, mass effect or midline shift.   Nonspecific scattered white matter hypodensities favored to represent sequela of small vessel ischemia.     MACRO: None.   Signed by: Evan Finkelstein 12/22/2024 12:47 AM Dictation workstation:   [id number]    XR chest 1 view    Result Date: 12/21/2024  Interpreted By:  Braxton Hale, STUDY: XR CHEST 1 VIEW;  12/21/2024 11:30 pm   INDICATION: Signs/Symptoms:Intermittent syncope.     COMPARISON: 11/12/2023.   ACCESSION NUMBER(S): [id number]   ORDERING CLINICIAN: MAILE AZUL   FINDINGS: AP radiograph of the chest was provided.   Apical lordosis. Limited slightly by soft tissue attenuation factors.   Redemonstrated median sternotomy. Stably configured cardiac pacer   CARDIOMEDIASTINAL SILHOUETTE: Cardiac silhouette is enlarged, unchanged. Tortuous and atherosclerotic thoracic aorta.   LUNGS: Lungs appear clear. No pleural effusion or pneumothorax.   ABDOMEN: No remarkable upper abdominal findings.   BONES: No acute osseous changes.       1.  No definite evidence of acute cardiopulmonary  abnormality.       MACRO: None   Signed by: Barxton Hale 12/21/2024 11:38 PM Dictation workstation:   [id number]   Results for orders placed or performed during the hospital encounter of 12/21/24 (from the past 24 hours)   Protime-INR   Result Value Ref Range    Protime 11.4 9.8 - 12.8 seconds    INR 1.0 0.9 - 1.1   Magnesium   Result Value Ref Range    Magnesium 2.05 1.60 - 2.40 mg/dL   Comprehensive Metabolic Panel   Result Value Ref Range    Glucose 87 74 - 99 mg/dL    Sodium 138 136 - 145 mmol/L    Potassium 4.2 3.5 - 5.3 mmol/L    Chloride 106 98 - 107 mmol/L    Bicarbonate 23 21 - 32 mmol/L    Anion Gap 13 10 - 20 mmol/L    Urea Nitrogen 27 (H) 6 - 23 mg/dL    Creatinine 1.90 (H) 0.50 - 1.30 mg/dL    eGFR 38 (L) >60 mL/min/1.73m*2    Calcium 8.9 8.6 - 10.3 mg/dL    Albumin 4.5 3.4 - 5.0 g/dL    Alkaline Phosphatase 66 33 - 136 U/L    Total Protein 7.1 6.4 - 8.2 g/dL    AST 17 9 - 39 U/L    Bilirubin, Total 0.5 0.0 - 1.2 mg/dL    ALT 18 10 - 52 U/L   CBC and Auto Differential   Result Value Ref Range    WBC 6.3 4.4 - 11.3 x10*3/uL    nRBC 0.0 0.0 - 0.0 /100 WBCs    RBC 4.93 4.50 - 5.90 x10*6/uL    Hemoglobin 15.4 13.5 - 17.5 g/dL    Hematocrit 45.8 41.0 - 52.0 %    MCV 93 80 - 100 fL    MCH 31.2 26.0 - 34.0 pg    MCHC 33.6 32.0 - 36.0 g/dL    RDW 13.2 11.5 - 14.5 %    Platelets 170 150 - 450 x10*3/uL    Neutrophils % 51.5 40.0 - 80.0 %    Immature Granulocytes %, Automated 0.2 0.0 - 0.9 %    Lymphocytes % 35.8 13.0 - 44.0 %    Monocytes % 7.3 2.0 - 10.0 %    Eosinophils % 4.4 0.0 - 6.0 %    Basophils % 0.8 0.0 - 2.0 %    Neutrophils Absolute 3.25 1.20 - 7.70 x10*3/uL    Immature Granulocytes Absolute, Automated 0.01 0.00 - 0.70 x10*3/uL    Lymphocytes Absolute 2.26 1.20 - 4.80 x10*3/uL    Monocytes Absolute 0.46 0.10 - 1.00 x10*3/uL    Eosinophils Absolute 0.28 0.00 - 0.70 x10*3/uL    Basophils Absolute 0.05 0.00 - 0.10 x10*3/uL   B-Type Natriuretic Peptide   Result Value Ref Range    BNP 54 0 - 99 pg/mL    Troponin I, High Sensitivity, Initial   Result Value Ref Range    Troponin I, High Sensitivity 3 0 - 20 ng/L   BLOOD GAS ARTERIAL, COOX   Result Value Ref Range    POCT pH, Arterial 7.43 (H) 7.38 - 7.42 pH    POCT pCO2, Arterial 33 (L) 38 - 42 mm Hg    POCT pO2, Arterial 106 (H) 85 - 95 mm Hg    POCT SO2, Arterial 99 94 - 100 %    POCT Oxy Hemoglobin, Arterial 97.4 94.0 - 98.0 %    POCT Base Excess, Arterial -1.6 -2.0 - 3.0 mmol/L    POCT HCO3 Calculated, Arterial 21.9 (L) 22.0 - 26.0 mmol/L    POCT Hemoglobin, Arterial 14.8 13.5 - 17.5 g/dL    POCT Carboxyhemoglobin, Arterial 1.4 %    POCT Methemoglobin, Arterial 0.4 0.0 - 1.5 %    POCT Deoxy Hemoglobin, Arterial 0.7 0.0 - 5.0 %    Patient Temperature 37.0 degrees Celsius    FiO2 21 %    Site of Arterial Puncture Radial Left     Vlad's Test Positive    Troponin, High Sensitivity, 1 Hour   Result Value Ref Range    Troponin I, High Sensitivity 3 0 - 20 ng/L   CBC   Result Value Ref Range    WBC 6.3 4.4 - 11.3 x10*3/uL    nRBC 0.0 0.0 - 0.0 /100 WBCs    RBC 4.45 (L) 4.50 - 5.90 x10*6/uL    Hemoglobin 13.9 13.5 - 17.5 g/dL    Hematocrit 41.5 41.0 - 52.0 %    MCV 93 80 - 100 fL    MCH 31.2 26.0 - 34.0 pg    MCHC 33.5 32.0 - 36.0 g/dL    RDW 13.2 11.5 - 14.5 %    Platelets 143 (L) 150 - 450 x10*3/uL   Basic Metabolic Panel   Result Value Ref Range    Glucose 116 (H) 74 - 99 mg/dL    Sodium 140 136 - 145 mmol/L    Potassium 4.0 3.5 - 5.3 mmol/L    Chloride 107 98 - 107 mmol/L    Bicarbonate 28 21 - 32 mmol/L    Anion Gap 9 (L) 10 - 20 mmol/L    Urea Nitrogen 25 (H) 6 - 23 mg/dL    Creatinine 1.80 (H) 0.50 - 1.30 mg/dL    eGFR 40 (L) >60 mL/min/1.73m*2    Calcium 8.7 8.6 - 10.3 mg/dL   Troponin I, High Sensitivity   Result Value Ref Range    Troponin I, High Sensitivity 4 0 - 20 ng/L   Drug Screen, Urine   Result Value Ref Range    Amphetamine Screen, Urine Presumptive Negative Presumptive Negative    Barbiturate Screen, Urine Presumptive Negative Presumptive Negative     Benzodiazepines Screen, Urine Presumptive Negative Presumptive Negative    Cannabinoid Screen, Urine Presumptive Negative Presumptive Negative    Cocaine Metabolite Screen, Urine Presumptive Negative Presumptive Negative    Fentanyl Screen, Urine Presumptive Negative Presumptive Negative    Opiate Screen, Urine Presumptive Negative Presumptive Negative    Oxycodone Screen, Urine Presumptive Negative Presumptive Negative    PCP Screen, Urine Presumptive Negative Presumptive Negative    Methadone Screen, Urine Presumptive Negative Presumptive Negative       Assessment/Plan   Marnie is a 69-year-old male patient who is alert and oriented x 3 presenting to emergency department for evaluation of syncopal episodes.  Patient states that over the last several months he has had intermittent episodes of passing out.  Patient states that he has followed up with his PCP Dr. Benavidez and was recommended to have a sleep study.  Patient was found to have ROBERT and placed on CPAP but has not figured out how to set up his CPAP machine at home.  Patient became concerned because his job depends on him keeping his 's license and states that he has had an episode every day for the last several days where he has passed out at home.  Patient denies injury or trauma, imaging completed and negative for acute process.  Patient found to have an JOE in ED and given IV fluids.  RT on consult for CPAP, social work for help with CPAP  at home, admission for further medical management.    JOE/syncope/ROBERT  Admit to telemetry observation per Dr. Benavidez  See imaging results above  Trend troponin  IV fluid bolus in ED  Repeat labs in a.m.  Telemetry monitoring  Social work consult for CPAP assistance at home  CPAP nightly (RT evaluation)  Attending to consider adding a sleep aid for nighttime    HTN/HLD/MI/A-fib/colon cancer/neuroendocrine tumor/emphysema/SSS/pacemaker  #Chronic conditions  Nursing to complete home med rec  Cardiac  diet  Continue home medications when med rec is complete  Telemetry monitoring    DVT Ppx  SCDs  Heparin subcutaneous  Bathroom privileges with assistance    12/22: Patient to use home CPAP tonight, appreciate RT education.    I spent 35 minutes in the professional and overall care of this patient.  Ricardo Pimentel, DO

## 2024-12-26 LAB
ATRIAL RATE: 71 BPM
P AXIS: 60 DEGREES
PR INTERVAL: 175 MS
Q ONSET: 251 MS
QRS COUNT: 11 BEATS
QRS DURATION: 118 MS
QT INTERVAL: 410 MS
QTC CALCULATION(BAZETT): 446 MS
QTC FREDERICIA: 433 MS
R AXIS: -18 DEGREES
T AXIS: -46 DEGREES
T OFFSET: 456 MS
VENTRICULAR RATE: 71 BPM

## 2024-12-31 DIAGNOSIS — J43.9 PULMONARY EMPHYSEMA, UNSPECIFIED EMPHYSEMA TYPE (MULTI): Primary | ICD-10-CM

## 2025-01-02 ENCOUNTER — APPOINTMENT (OUTPATIENT)
Dept: PRIMARY CARE | Facility: CLINIC | Age: 70
End: 2025-01-02
Payer: MEDICARE

## 2025-01-02 DIAGNOSIS — F41.9 ANXIETY: ICD-10-CM

## 2025-01-02 DIAGNOSIS — I48.0 PAROXYSMAL ATRIAL FIBRILLATION (MULTI): ICD-10-CM

## 2025-01-06 RX ORDER — CITALOPRAM 40 MG/1
40 TABLET, FILM COATED ORAL DAILY
Qty: 90 TABLET | Refills: 1 | Status: SHIPPED | OUTPATIENT
Start: 2025-01-06

## 2025-01-06 RX ORDER — METOPROLOL SUCCINATE 25 MG/1
25 TABLET, EXTENDED RELEASE ORAL DAILY
Qty: 90 TABLET | Refills: 1 | Status: SHIPPED | OUTPATIENT
Start: 2025-01-06

## 2025-01-06 RX ORDER — ATORVASTATIN CALCIUM 80 MG/1
80 TABLET, FILM COATED ORAL DAILY
Qty: 90 TABLET | Refills: 1 | Status: SHIPPED | OUTPATIENT
Start: 2025-01-06

## 2025-01-14 ENCOUNTER — APPOINTMENT (OUTPATIENT)
Dept: PHARMACY | Facility: HOSPITAL | Age: 70
End: 2025-01-14
Payer: MEDICARE

## 2025-01-14 ENCOUNTER — TELEPHONE (OUTPATIENT)
Dept: PHARMACY | Facility: HOSPITAL | Age: 70
End: 2025-01-14

## 2025-01-23 ENCOUNTER — TELEMEDICINE (OUTPATIENT)
Dept: PHARMACY | Facility: HOSPITAL | Age: 70
End: 2025-01-23
Payer: MEDICARE

## 2025-01-23 DIAGNOSIS — F41.9 ANXIETY: ICD-10-CM

## 2025-01-23 DIAGNOSIS — G62.9 NEUROPATHY: ICD-10-CM

## 2025-01-23 DIAGNOSIS — R42 DIZZINESS: ICD-10-CM

## 2025-01-23 DIAGNOSIS — D3A.8: ICD-10-CM

## 2025-01-23 DIAGNOSIS — G47.33 OBSTRUCTIVE SLEEP APNEA SYNDROME: ICD-10-CM

## 2025-01-23 DIAGNOSIS — F32.A DEPRESSIVE DISORDER: ICD-10-CM

## 2025-01-23 DIAGNOSIS — J43.9 PULMONARY EMPHYSEMA, UNSPECIFIED EMPHYSEMA TYPE (MULTI): Primary | ICD-10-CM

## 2025-01-23 RX ORDER — ASPIRIN 81 MG/1
81 TABLET ORAL DAILY
COMMUNITY

## 2025-01-23 RX ORDER — GABAPENTIN 300 MG/1
300 CAPSULE ORAL 2 TIMES DAILY
Qty: 180 CAPSULE | Refills: 0 | Status: SHIPPED | OUTPATIENT
Start: 2025-01-23 | End: 2025-01-24

## 2025-01-23 RX ORDER — HYDROGEN PEROXIDE 3 %
20 SOLUTION, NON-ORAL MISCELLANEOUS 2 TIMES DAILY
COMMUNITY

## 2025-01-23 RX ORDER — FAMOTIDINE 20 MG/1
20 TABLET, FILM COATED ORAL NIGHTLY PRN
COMMUNITY

## 2025-01-23 RX ORDER — CLONAZEPAM 1 MG/1
1 TABLET ORAL 2 TIMES DAILY
Qty: 60 TABLET | Refills: 2 | Status: SHIPPED | OUTPATIENT
Start: 2025-01-23
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
## 2025-01-23 NOTE — PATIENT INSTRUCTIONS
NAME: Marnie Jha   DATE: 1/23/2025     Your Pharmacist Today: Ambika Cobian, PharmD  Your Primary Care Physician: Tre Benavidez, DO   Your Referring Provider: Tre Benavidez, DO    Thank you for your time today, Mr. Marnie Jha. It was a pleasure managing your health together! Below is a summary of your treatment plan, other important information, and our pharmacy team's contact numbers:  If you need to schedule or re-schedule an appointment with us, please call our scheduling line at [phone], option 1.   To schedule non-pharmacy appointments please call [phone]  If you are out of medication refills or have a medical question, please contact me at my direct line, [phone]. Please allow for up to 48 hours for a return call. You can also contact me through [employer].    DIAGNOSIS:        TREATMENT PLAN     Medication Changes:      Patient Goals    Patient Instructions/Plan  Exercise daily      Referrals:  I am referring you to:   ***    Follow-up Appointment:   Follow-up with me in ***.    Education:       IMPORTANT INFORMATION     Please call [phone] for medical emergencies.  Our pharmacy team is generally available from Monday-Friday, 8 am - 5 pm.  If you need to get in touch with me, you may either call me at my direct line or you can use [employer].  If you are unable to make your appointment, kindly call your our pharmacy scheduling line at [phone] at least 48 hours in advance to cancel and reschedule.  There are no supporting services by the pharmacy team on weekends and holidays, or after 5 PM on weekdays.      RETAIL PHARMACY     [facility]    [address]  [place] [id number]  Phone: [phone]  Hours: M-F: 8 am - 6 pm;   Saturday: 8 am - 4 pm; Sunday 9 am - 1 pm

## 2025-01-24 RX ORDER — GABAPENTIN 300 MG/1
300 CAPSULE ORAL 2 TIMES DAILY
Qty: 180 CAPSULE | Refills: 0 | Status: SHIPPED | OUTPATIENT
Start: 2025-01-24

## 2025-01-27 ENCOUNTER — APPOINTMENT (OUTPATIENT)
Dept: BEHAVIORAL HEALTH | Facility: CLINIC | Age: 70
End: 2025-01-27
Payer: MEDICARE

## 2025-01-28 ENCOUNTER — OFFICE VISIT (OUTPATIENT)
Dept: PRIMARY CARE | Facility: CLINIC | Age: 70
End: 2025-01-28
Payer: MEDICARE

## 2025-01-28 VITALS
DIASTOLIC BLOOD PRESSURE: 72 MMHG | WEIGHT: 188 LBS | HEART RATE: 96 BPM | BODY MASS INDEX: 33.31 KG/M2 | OXYGEN SATURATION: 97 % | SYSTOLIC BLOOD PRESSURE: 126 MMHG

## 2025-01-28 DIAGNOSIS — G47.33 OSA (OBSTRUCTIVE SLEEP APNEA): Primary | ICD-10-CM

## 2025-01-28 PROCEDURE — 99214 OFFICE O/P EST MOD 30 MIN: CPT | Performed by: STUDENT IN AN ORGANIZED HEALTH CARE EDUCATION/TRAINING PROGRAM

## 2025-01-28 PROCEDURE — 1159F MED LIST DOCD IN RCRD: CPT | Performed by: STUDENT IN AN ORGANIZED HEALTH CARE EDUCATION/TRAINING PROGRAM

## 2025-01-28 PROCEDURE — 1036F TOBACCO NON-USER: CPT | Performed by: STUDENT IN AN ORGANIZED HEALTH CARE EDUCATION/TRAINING PROGRAM

## 2025-01-28 PROCEDURE — 3078F DIAST BP <80 MM HG: CPT | Performed by: STUDENT IN AN ORGANIZED HEALTH CARE EDUCATION/TRAINING PROGRAM

## 2025-01-28 PROCEDURE — 3074F SYST BP LT 130 MM HG: CPT | Performed by: STUDENT IN AN ORGANIZED HEALTH CARE EDUCATION/TRAINING PROGRAM

## 2025-01-28 PROCEDURE — 1123F ACP DISCUSS/DSCN MKR DOCD: CPT | Performed by: STUDENT IN AN ORGANIZED HEALTH CARE EDUCATION/TRAINING PROGRAM

## 2025-01-28 RX ORDER — MODAFINIL 200 MG/1
200 TABLET ORAL DAILY
Qty: 14 TABLET | Refills: 0 | Status: SHIPPED | OUTPATIENT
Start: 2025-01-28 | End: 2025-02-11

## 2025-01-28 ASSESSMENT — ENCOUNTER SYMPTOMS: DEPRESSION: 0

## 2025-01-28 NOTE — PROGRESS NOTES
Subjective   Patient ID: Marnie Jha is a 69 y.o. male who presents for Sleeping Problem (Sleeping too much/Getting worse/Said he had to quit his part time job).    HPI     Sleeping issues: Patient has moderate sleep apnea due to having problems for many years he is off also sleep.  Feels overwhelming tiredness we just falls asleep can wake up.  This is not for a long time.  He does sleep through the night.  Does not wear his CPAP though.  We did try again to neurology and sleep doctor has not gone yet.  Could relate to narcolepsy.  Versus overmedication.  I do wish to get off clonazepam which she agrees we will do some toe 2 pills once a day 1 pill every other day.  Please do some time trying AS A GENESIS.  REFERRAL TO SLEEP MEDICINE AS WELL.    Review of Systems   All other systems reviewed and are negative.      Objective   /72 (BP Location: Left arm, Patient Position: Sitting)   Pulse 96   Wt 85.3 kg (188 lb)   SpO2 97%   BMI 33.31 kg/m²     Physical Exam  Constitutional:       Appearance: Normal appearance.   HENT:      Head: Normocephalic and atraumatic.      Right Ear: Tympanic membrane and ear canal normal.      Left Ear: Tympanic membrane and ear canal normal.      Mouth/Throat:      Mouth: Mucous membranes are moist.      Pharynx: Oropharynx is clear.   Eyes:      Extraocular Movements: Extraocular movements intact.      Conjunctiva/sclera: Conjunctivae normal.      Pupils: Pupils are equal, round, and reactive to light.   Cardiovascular:      Rate and Rhythm: Normal rate and regular rhythm.      Pulses: Normal pulses.      Heart sounds: Normal heart sounds.   Pulmonary:      Effort: Pulmonary effort is normal.      Breath sounds: Normal breath sounds.   Abdominal:      General: Abdomen is flat. Bowel sounds are normal.      Palpations: Abdomen is soft.   Musculoskeletal:         General: Normal range of motion.      Cervical back: Normal range of motion and neck supple.   Skin:     General: Skin  is warm and dry.      Capillary Refill: Capillary refill takes 2 to 3 seconds.   Neurological:      General: No focal deficit present.      Mental Status: He is alert and oriented to person, place, and time. Mental status is at baseline.   Psychiatric:         Mood and Affect: Mood normal.         Behavior: Behavior normal.         Thought Content: Thought content normal.         Judgment: Judgment normal.       Assessment/Plan   1. ROBERT (obstructive sleep apnea) (Primary)    - Referral to Adult Sleep Medicine; Future  - modafinil (ProvigiL) 200 mg tablet; Take 1 tablet (200 mg) by mouth once daily for 14 days.  Dispense: 14 tablet; Refill: 0  -Send over narcolepsy versus obstructive sleep apnea  -Refer to sleep medicine  -They have a long discussion long-term treatment options things to do.  Trial of modafinil to see if this helps him keep awake.  Is causing great distress as he had to quit his part-time job and cannot stay away.  And sleep well at night either.  Chest tingling with clonazepam day at night and try to wean him also his medications.

## 2025-01-29 ENCOUNTER — LAB (OUTPATIENT)
Dept: LAB | Facility: CLINIC | Age: 70
End: 2025-01-29
Payer: MEDICARE

## 2025-01-29 LAB
ALBUMIN SERPL BCP-MCNC: 4.4 G/DL (ref 3.4–5)
ALP SERPL-CCNC: 68 U/L (ref 33–136)
ALT SERPL W P-5'-P-CCNC: 20 U/L (ref 10–52)
ANION GAP SERPL CALC-SCNC: 13 MMOL/L (ref 10–20)
AST SERPL W P-5'-P-CCNC: 16 U/L (ref 9–39)
BASOPHILS # BLD AUTO: 0.07 X10*3/UL (ref 0–0.1)
BASOPHILS NFR BLD AUTO: 1 %
BILIRUB SERPL-MCNC: 0.7 MG/DL (ref 0–1.2)
BUN SERPL-MCNC: 22 MG/DL (ref 6–23)
CALCIUM SERPL-MCNC: 9.1 MG/DL (ref 8.6–10.3)
CEA SERPL-MCNC: 1.7 UG/L
CHLORIDE SERPL-SCNC: 105 MMOL/L (ref 98–107)
CO2 SERPL-SCNC: 25 MMOL/L (ref 21–32)
CREAT SERPL-MCNC: 1.39 MG/DL (ref 0.5–1.3)
EGFRCR SERPLBLD CKD-EPI 2021: 55 ML/MIN/1.73M*2
EOSINOPHIL # BLD AUTO: 0.37 X10*3/UL (ref 0–0.7)
EOSINOPHIL NFR BLD AUTO: 5.3 %
ERYTHROCYTE [DISTWIDTH] IN BLOOD BY AUTOMATED COUNT: 13.1 % (ref 11.5–14.5)
GLUCOSE SERPL-MCNC: 90 MG/DL (ref 74–99)
HCT VFR BLD AUTO: 43.4 % (ref 41–52)
HGB BLD-MCNC: 14.7 G/DL (ref 13.5–17.5)
IMM GRANULOCYTES # BLD AUTO: 0.02 X10*3/UL (ref 0–0.7)
IMM GRANULOCYTES NFR BLD AUTO: 0.3 % (ref 0–0.9)
LYMPHOCYTES # BLD AUTO: 2.88 X10*3/UL (ref 1.2–4.8)
LYMPHOCYTES NFR BLD AUTO: 40.9 %
MCH RBC QN AUTO: 30.9 PG (ref 26–34)
MCHC RBC AUTO-ENTMCNC: 33.9 G/DL (ref 32–36)
MCV RBC AUTO: 91 FL (ref 80–100)
MONOCYTES # BLD AUTO: 0.67 X10*3/UL (ref 0.1–1)
MONOCYTES NFR BLD AUTO: 9.5 %
NEUTROPHILS # BLD AUTO: 3.03 X10*3/UL (ref 1.2–7.7)
NEUTROPHILS NFR BLD AUTO: 43 %
NRBC BLD-RTO: 0 /100 WBCS (ref 0–0)
PLATELET # BLD AUTO: 218 X10*3/UL (ref 150–450)
POTASSIUM SERPL-SCNC: 4.2 MMOL/L (ref 3.5–5.3)
PROT SERPL-MCNC: 7 G/DL (ref 6.4–8.2)
RBC # BLD AUTO: 4.75 X10*6/UL (ref 4.5–5.9)
SODIUM SERPL-SCNC: 139 MMOL/L (ref 136–145)
WBC # BLD AUTO: 7 X10*3/UL (ref 4.4–11.3)

## 2025-01-29 PROCEDURE — 80053 COMPREHEN METABOLIC PANEL: CPT | Performed by: INTERNAL MEDICINE

## 2025-01-29 PROCEDURE — 85025 COMPLETE CBC W/AUTO DIFF WBC: CPT | Performed by: INTERNAL MEDICINE

## 2025-01-29 PROCEDURE — 82378 CARCINOEMBRYONIC ANTIGEN: CPT | Mod: PARLAB | Performed by: INTERNAL MEDICINE

## 2025-02-04 ENCOUNTER — TELEPHONE (OUTPATIENT)
Dept: PRIMARY CARE | Facility: CLINIC | Age: 70
End: 2025-02-04

## 2025-02-04 ENCOUNTER — OFFICE VISIT (OUTPATIENT)
Dept: HEMATOLOGY/ONCOLOGY | Facility: CLINIC | Age: 70
End: 2025-02-04
Payer: MEDICARE

## 2025-02-04 VITALS
BODY MASS INDEX: 33.79 KG/M2 | RESPIRATION RATE: 18 BRPM | HEART RATE: 89 BPM | SYSTOLIC BLOOD PRESSURE: 147 MMHG | TEMPERATURE: 98.1 F | WEIGHT: 190.7 LBS | OXYGEN SATURATION: 95 % | DIASTOLIC BLOOD PRESSURE: 84 MMHG

## 2025-02-04 DIAGNOSIS — C18.9 COLON ADENOCARCINOMA (MULTI): ICD-10-CM

## 2025-02-04 PROCEDURE — 1123F ACP DISCUSS/DSCN MKR DOCD: CPT | Performed by: INTERNAL MEDICINE

## 2025-02-04 PROCEDURE — 99214 OFFICE O/P EST MOD 30 MIN: CPT | Performed by: INTERNAL MEDICINE

## 2025-02-04 PROCEDURE — 1126F AMNT PAIN NOTED NONE PRSNT: CPT | Performed by: INTERNAL MEDICINE

## 2025-02-04 PROCEDURE — 3079F DIAST BP 80-89 MM HG: CPT | Performed by: INTERNAL MEDICINE

## 2025-02-04 PROCEDURE — 1159F MED LIST DOCD IN RCRD: CPT | Performed by: INTERNAL MEDICINE

## 2025-02-04 PROCEDURE — 3077F SYST BP >= 140 MM HG: CPT | Performed by: INTERNAL MEDICINE

## 2025-02-04 ASSESSMENT — PAIN SCALES - GENERAL: PAINLEVEL_OUTOF10: 0-NO PAIN

## 2025-02-04 NOTE — TELEPHONE ENCOUNTER
Said he had a 6 months check in with his cancer  and they did BW and they told him to reach out concerning his kidney function   Please advise

## 2025-02-04 NOTE — PROGRESS NOTES
Cancer History:   Treatment Synopsis:    E. RECTOSIGMOID COLON MASS, BIOPSY: ADENOCARCINOMA.   MISMATCH REPAIR PROTEIN EXPRESSION:     Protein: Result:   MLH-1 Expression Present    PMS-2 Expression Present   MSH-2 Expression Present   MSH-6 Expression Present     INTERPRETATION: Colon neoplasm with normal mismatch   repair protein expression.     FINAL DIAGNOSIS   A. SIGMOID COLON STAPLE LINE DISTAL:    --MODERATELY DIFFERENTIATED COLONIC ADENOCARCINOMA, 5.0 CM, WITH INVASION   THROUGH THE MUSCULARIS PROPRIA AND INTO PERICOLIC TISSUE, SEE SUMMARY.   --MARGINS OF EXCISION ARE NEGATIVE FOR CARCINOMA.   --MESENTERIC LYMPH NODES ARE NEGATIVE  FOR MALIGNANCY (0/11), SEE COMMENT.   --TUMOR DEPOSITS, TWO.      Procedure: Sigmoidectomy   Tumor Site: Sigmoid colon   Tumor Size: 5.0 cm   Macroscopic Tumor Perforation: Not identified   Histologic Type: Adenocarcinoma    Histologic Grade: G2   Tumor Extension: Tumor invades through the muscularis propria into pericolonic   tissue   Margins: All margins are uninvolved by invasive carcinoma, high grade   dysplasia/intramucosal carcinoma, and low grade dysplasia    Treatment Effect: No known presurgical therapy   Lymphovascular Invasion: Not identified   Perineural Invasion: Not identified   Tumor Deposits: Present, 2 deposits   Number of Lymph Nodes Involved: 0   Number of Lymph Nodes Examined:  11   Pathologic Staging (pTNM): pT3 pN1c   pt3, pN1c, M0 stage IIIB, July 2020  Duodenal nodule fine-needle biopsy revealed neuroendocrine tumor grade 1 on August 24, 2020  Case is Amended   FINAL DIAGNOSIS   A. 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     The patient is a 65 year old man with a past medical history significant for CAD, HTN, Hyperlipidemia, dizziness fatigue and cancer.  The patient was doing well until he started having bowel movement complaints in early July, which prompted a colonoscopy  that revealed a mass in the sigmoid colon. He also obtained laparoscopic sigmoidectomy that revealed sigmoid mass and 2 tumor deposits, 0 out of 14 lymph nodes were positive. CT scan did not reveal  any metastatic disease but an upper endoscopy did reveal  duodenal nodules. The patient underwent nodule removal and is now 2 weeks s/p the surgery.     The patient had called for a follow up on 8/7/2024 regarding his history of colon cancer s/p resection.  Recall at last visit we discussed that his upper endoscopy and biopsy of duodenal nodule revealed grade 1 neuroendocrine tumor.  The patient has received  cycle 12 of adjuvant chemotherapy using FOLFOX and been tolerating well. 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 The patient reports flank  pain but is overall doing well. He denies any history of abdominal pain, diarrhea, wheezing and lymphadenopathy.     The patient had called for a follow up on 2/4/25 regarding  history of colon cancer s/p resection.  Recall at last visit we discussed that his upper endoscopy and biopsy of duodenal nodule revealed grade 1 neuroendocrine tumor.  The patient has received  cycle 12 of adjuvant chemotherapy using FOLFOX and been tolerating well. Path report on 05/05/2021 indicated pt3, pN1c, M0 stage IIIB neuroendocrine  tumor of the duodenum s/p gastrectomy and resection of the  non-secretory tumor. The patient reports flank  pain but is overall doing well. He denies any history of abdominal pain, diarrhea, wheezing and lymphadenopath     Past Medical History:  1. Adenocarcinoma of the sigmoid colon  2. CAD   3. HTN   4. Hyperlipidemia  5. Dizziness   6. Fatigue  7. Left Kidney lesion  8. Anxiety and Depression       Past Surgical History:  1. CABG (quadruple bypass)  2. Cholecystectomy   3. Lesion removals  4.  Status post pacemaker insertion in May 2022 for bradycardia and dizziness.  Stress test was reportedly normal.  Family Medical History:  1. CAD   2. HTN     Last colonoscopy was in 07/2020.           Review of Systems:   ·  System Review All other systems have been reviewed and are negative for complaint.      · Constitutional NEGATIVE: Fever, Chills, Anorexia, Weight Loss, Malaise      · Eyes NEGATIVE: Blurry Vision, Drainage, Diploplia, Redness, Vision Loss/ Change      · ENMT NEGATIVE: Nasal Discharge, Nasal Congestion, Ear Pain, Mouth Pain, Throat Pain      · Respiratory NEGATIVE: Dry Cough, Productive Cough, Hemoptysis, Wheezing, Shortness of Breath      · Cardiology NEGATIVE: Chest Pain, Dyspnea on Exertion, Orthopnea, Palpitations, Syncope      ·  Gastrointestinal POSITIVE: Nausea     NEGATIVE: Abdominal Pain, Constipation, Diarrhea, Vomiting      · Genitourinary NEGATIVE: Discharge, Dysuria, Flank Pain, Frequency, Hematuria      · Musculoskeletal NEGATIVE: Decreased ROM, Pain, Swelling, Stiffness, Weakness      · Neurological NEGATIVE: Dizziness, Confusion, Headache, Seizures, Syncope      · Psychiatric NEGATIVE: Mood Changes, Anxiety, Hallucinations, Sleep Changes, Suicidal Ideas      · Skin NEGATIVE: Mass, Pain, Pruritus, Rash, Ulcer      · Endocrine NEGATIVE: Heat Intolerance, Cold Intolerance, Sweat, Polyuria, Thirst      · Hematologic/Lymph NEGATIVE: Anemia, Bruising, Easy Bleeding, Night Sweats, Petechiae       · Allergic/Immunologic NEGATIVE: Anaphylaxis, Itchy/ Teary Eyes, Itching, Sneezing, Swelling      · Breast NEGATIVE: Pain, Mass, Discharge, Nipple Itching, Gynecomastia         Allergies and Intolerances:       Intolerances:         Demerol: Drug, Nausea/Vomiting, Active     Outpatient Medication Profile:  * Patient Currently Takes Medications as of 08-Aug-2023 10:21 documented in Structured Notes         MiraLax oral powder for reconstitution : Last Dose Taken:  , 17 gram(s) orally once a day, As needed, Constipation, Start Date: 30-Mar-2023         Colace 100 mg oral capsule: Last Dose Taken:  , 1 cap(s) orally 2 times  a day as needed for constipation , Start Date: 30-Mar-2023         Metoprolol Succinate ER 25 mg oral tablet, extended release: Last Dose  Taken:  , 1 tab(s) orally once a day, Start Date: 30-Mar-2023         acetaminophen 325 mg oral tablet: Last Dose Taken:  , 2 tab(s) orally  every 4 hours, As needed, Pain - Mild (1-3), Start Date: 26-Apr-2022         atorvastatin 80 mg oral tablet: Last Dose Taken:  , 1 tab(s) orally once  a day (at bedtime)         amLODIPine 10 mg oral tablet: Last Dose Taken:  , 1 tab(s) orally once  a day (in the morning)         lisinopril 20 mg oral tablet: Last Dose Taken:  , 1 tab(s) orally once  a day (in the morning)         citalopram 40 mg oral tablet: Last Dose Taken:  , 1 tab(s) orally once  a day (in the morning)         gabapentin 300 mg oral capsule: Last Dose Taken:  , 1 cap(s) orally 2  times a day         omeprazole 20 mg oral delayed release tablet: Last Dose Taken:  , 1 tab(s)  orally 2 times a day         famotidine 20 mg oral tablet: Last Dose Taken:  , 1 tab(s) orally 2 times  a day         clonazePAM 1 mg oral tablet: Last Dose Taken:  , 1 tab(s) orally in the  morning and at bedtime         aspirin 81 mg oral tablet: Last Dose Taken:  , 1 tab(s) oral once a day             Medical History:         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, Status: Active          Iron deficiency anemia: ICD-10: D50.9, Status: Active         Fatigue: ICD-10: R53.83, Status: Active         Dizziness: ICD-10: R42, Status: Active         Hyperlipidemia: ICD-10: E78.5, Status: Active         HTN (hypertension): ICD-10: I10, Status: Active         CAD (coronary artery disease): ICD-10: I25.10, Status: Active         Colon cancer: ICD-10: C18.9, Status: Active       Surg History:         Low iron: ICD-10: E61.1, Status: Active         Family history of cholecystectomy: ICD-10: Z83.79, Status:  Active         Status post coronary artery bypass graft: ICD-10: Z95.1,  Status: Active     Family History: Family history of coronary artery  disease (Father Age Unknown)  Family history of hypertension (Father Age Unknown)  Family history of hypertension (Mother Age Unknown)      Social History:   Social Substance History:  ·  Smoking Status never smoker (1)   ·  Additional History     66 year old male    with no children  No substance history (1)           Vitals and Measurements:   Vitals: Temp: 36.6  HR: 72  RR: 18  BP: 118/80  SPO2%:   97   Measurements: HT(cm): 159.7  WT(kg): 85.6  BSA: 1.94   BMI:  33.5      Physical Exam:      Constitutional: Well developed, awake/alert/oriented  x3, no distress, alert and cooperative   Eyes: PERRL, EOMI, clear sclera   ENMT: mucous membranes moist, no apparent injury,  no lesions seen   Head/Neck: Neck supple, no apparent injury, thyroid  without mass or tenderness, No JVD, trachea midline, no bruits   Respiratory/Thorax: Patent airways, CTAB, normal  breath sounds with good chest expansion, thorax symmetric   Cardiovascular: Regular, rate and rhythm, no murmurs,  2+ equal pulses of the extremities, normal S 1and S 2   Gastrointestinal: Nondistended, soft, non-tender,  no rebound tenderness or guarding, no masses palpable, no organomegaly, +BS, no bruits   Genitourinary: No Discharge, vesicles or other abnormalities   Musculoskeletal: ROM intact, no  joint swelling, normal  strength   Extremities: normal extremities, no cyanosis edema,  contusions or wounds, no clubbing   Neurological: alert and oriented x3, intact senses,  motor, response and reflexes, normal strength   Breast: No masses, tenderness, no discharge or discoloration   Lymphatic: No significant lymphadenopathy   Psychological: Appropriate mood and behavior   Skin: Warm and dry, no lesions, no rashes         Lab Results:           Radiology Result:     ·  Results           Impression:     CHEST:  1.  No focal infiltrate, pulmonary nodule or lymphadenopathy  identified.  2. Fusiform dilatation of the ascending thoracic aorta, similar to  the prior study.        ABDOMEN-PELVIS:  1.  Postoperative changes, as above.  2. No new or enlarging mass or lymphadenopathy identified.  3. No evidence of bowel obstruction, free intraperitoneal air or  abnormal intra-abdominal fluid collection.      CT Chest Abdomen Pelvis w/wo IV Contrast [Feb 8 2022  1:50PM]        Assessment and Plan:         The patient was referred to for further evaluation and management of recently diagnosed adenocarcinoma of the sigmoid colon.      1. Adenocarcinoma of the sigmoid colon     The patient is a 65 year old man with a past medical history significant for CAD, HTN, Hyperlipidemia, dizziness fatigue and cancer.  The patient was doing well until he started having bowel movement complaints in early July, which prompted a colonoscopy  that revealed a mass in the sigmoid colon. He also obtained laparoscopic studies that revealed 2 tumor deposits. CT scan did not reveal  any metastatic disease but an upper endoscopy did reveal the nodules. The patient underwent nodule removal and is  now 2 weeks s/p the surgery.   We discussed that his diagnosis is stage III adenocarcinoma of the sigmoid colon and that she should initiate adjuvant chemotherapy to achieve absolute benefit. I offered him the following options:  1. Do nothing  2.  Adjuvant chemotherapy using FOLFOX  The patient would like to proceed with chemotherapy every 2 weeks using FOLFOX.  Effect side effects explained.     The patient had called for a follow up on March August 7, 2024. Physical exam was within normal limits. I reviewed the lab data with the patient. Has tolerated Cycle 12 of chemotherapy using FOLFOX the patient is grateful for all the details. 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 The patient remains s/p gastrectomy and resection of the  non-secretory tumor. No treatement indicated for neuroendocrine tumor however, the patient should be followed  clinically.  Restaging CT scan of chest abdomen pelvis in December 2022 did not reveal any evidence of local or distant recurrence, surgery related changes.  The patient is pleased.  CT scan of chest abdomen pelvis on October 2, 2023 did not reveal any evidence of local or distal recurrence return in 6 months.    The patient had called for a follow up on 2/4/25 regarding  history of colon cancer s/p resection.  Recall at last visit we discussed that his upper endoscopy and biopsy of duodenal nodule revealed grade 1 neuroendocrine tumor.  The patient has received  cycle 12 of adjuvant chemotherapy using FOLFOX and been tolerating well. 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 The patient reports flank  pain but is overall doing well. He denies any history of abdominal pain, diarrhea, wheezing and lymphadenopath     2. CAD   - Managed by PCP, cardiology and cardiac surgery   - Anticoagulation with Lovenox 40 mg   -S/p pacemaker insertion in May 2022  3. HTN   - Managed by PCP, cardiology and cardiac surgery   - Metoprolol 50 mg   - Lisinopril 5 mg      4. Hyperlipidemia  - Atorvastatin 80 mg      5. Dizziness      6. Fatigue     7. Left Kidney  lesion      - Surgical intervention     8. Mental health/ anxiety and depression   - Counseling services   - Celexa 40mg   - Klonopin 1 mg

## 2025-02-11 DIAGNOSIS — I71.21 ANEURYSM OF ASCENDING AORTA WITHOUT RUPTURE (CMS-HCC): ICD-10-CM

## 2025-02-26 ENCOUNTER — APPOINTMENT (OUTPATIENT)
Dept: PHARMACY | Facility: HOSPITAL | Age: 70
End: 2025-02-26
Payer: MEDICARE

## 2025-03-03 ENCOUNTER — HOSPITAL ENCOUNTER (OUTPATIENT)
Dept: CARDIOLOGY | Facility: CLINIC | Age: 70
Discharge: HOME | End: 2025-03-03
Payer: MEDICARE

## 2025-03-03 DIAGNOSIS — Z95.0 CARDIAC PACEMAKER IN SITU: ICD-10-CM

## 2025-03-03 DIAGNOSIS — I49.5 SICK SINUS SYNDROME (MULTI): ICD-10-CM

## 2025-03-03 PROCEDURE — 93296 REM INTERROG EVL PM/IDS: CPT

## 2025-03-04 DIAGNOSIS — I10 HYPERTENSION, UNSPECIFIED TYPE: ICD-10-CM

## 2025-03-04 RX ORDER — LISINOPRIL 20 MG/1
20 TABLET ORAL DAILY
Qty: 90 TABLET | Refills: 1 | Status: SHIPPED | OUTPATIENT
Start: 2025-03-04

## 2025-03-04 RX ORDER — AMLODIPINE BESYLATE 5 MG/1
5 TABLET ORAL DAILY
Qty: 90 TABLET | Refills: 1 | Status: SHIPPED | OUTPATIENT
Start: 2025-03-04

## 2025-04-01 ENCOUNTER — OFFICE VISIT (OUTPATIENT)
Dept: SLEEP MEDICINE | Facility: CLINIC | Age: 70
End: 2025-04-01
Payer: MEDICARE

## 2025-04-01 VITALS
OXYGEN SATURATION: 98 % | HEIGHT: 64 IN | TEMPERATURE: 97.6 F | BODY MASS INDEX: 32.44 KG/M2 | SYSTOLIC BLOOD PRESSURE: 128 MMHG | HEART RATE: 72 BPM | DIASTOLIC BLOOD PRESSURE: 93 MMHG | WEIGHT: 190 LBS

## 2025-04-01 DIAGNOSIS — G47.09 OTHER INSOMNIA: ICD-10-CM

## 2025-04-01 DIAGNOSIS — G47.33 OBSTRUCTIVE SLEEP APNEA SYNDROME: Primary | ICD-10-CM

## 2025-04-01 DIAGNOSIS — G47.33 OSA (OBSTRUCTIVE SLEEP APNEA): ICD-10-CM

## 2025-04-01 PROCEDURE — 99204 OFFICE O/P NEW MOD 45 MIN: CPT | Performed by: PHYSICIAN ASSISTANT

## 2025-04-01 PROCEDURE — 3008F BODY MASS INDEX DOCD: CPT | Performed by: PHYSICIAN ASSISTANT

## 2025-04-01 PROCEDURE — 1123F ACP DISCUSS/DSCN MKR DOCD: CPT | Performed by: PHYSICIAN ASSISTANT

## 2025-04-01 PROCEDURE — 3074F SYST BP LT 130 MM HG: CPT | Performed by: PHYSICIAN ASSISTANT

## 2025-04-01 PROCEDURE — 1036F TOBACCO NON-USER: CPT | Performed by: PHYSICIAN ASSISTANT

## 2025-04-01 PROCEDURE — 1126F AMNT PAIN NOTED NONE PRSNT: CPT | Performed by: PHYSICIAN ASSISTANT

## 2025-04-01 PROCEDURE — 3080F DIAST BP >= 90 MM HG: CPT | Performed by: PHYSICIAN ASSISTANT

## 2025-04-01 PROCEDURE — 99214 OFFICE O/P EST MOD 30 MIN: CPT | Performed by: PHYSICIAN ASSISTANT

## 2025-04-01 PROCEDURE — 1159F MED LIST DOCD IN RCRD: CPT | Performed by: PHYSICIAN ASSISTANT

## 2025-04-01 ASSESSMENT — LIFESTYLE VARIABLES
HOW MANY STANDARD DRINKS CONTAINING ALCOHOL DO YOU HAVE ON A TYPICAL DAY: PATIENT DOES NOT DRINK
AUDIT-C TOTAL SCORE: 0
HOW OFTEN DO YOU HAVE SIX OR MORE DRINKS ON ONE OCCASION: NEVER
HOW OFTEN DO YOU HAVE A DRINK CONTAINING ALCOHOL: NEVER
HOW OFTEN DO YOU HAVE SIX OR MORE DRINKS ON ONE OCCASION: NEVER
SKIP TO QUESTIONS 9-10: 1
HOW OFTEN DO YOU HAVE A DRINK CONTAINING ALCOHOL: NEVER
SKIP TO QUESTIONS 9-10: 1
AUDIT-C TOTAL SCORE: 0
HOW MANY STANDARD DRINKS CONTAINING ALCOHOL DO YOU HAVE ON A TYPICAL DAY: PATIENT DOES NOT DRINK

## 2025-04-01 ASSESSMENT — ENCOUNTER SYMPTOMS
OCCASIONAL FEELINGS OF UNSTEADINESS: 0
DEPRESSION: 0
LOSS OF SENSATION IN FEET: 1

## 2025-04-01 ASSESSMENT — PAIN SCALES - GENERAL: PAINLEVEL_OUTOF10: 0-NO PAIN

## 2025-04-01 NOTE — ASSESSMENT & PLAN NOTE
-moderate on testing last fall  -has cpap at home not using, did not give much of a try  -discussed impact on sleep quality of untreated sleep apnea and likely a cause of some of his sleep maintenance issues  -discussed avoid drowsy driving or any activities that could be dangerous/require high level of concentration when drowsy    **instructed to bring all equipment to next visit; likely best to try to get his sleep schedule in order first before trying to use again but we will troubleshoot next visit in clinic

## 2025-04-01 NOTE — ASSESSMENT & PLAN NOTE
-ongoing for years, significantly worse with [facility]   -very poor sleep hygiene --> advised against blue light emitting electronics 1-2 hours before bed and while in bed  -reduce caffeine intake; currently t 6-10 cokes per day --> reduce by 1-2 servings/week with goal no more than 2 per day before noon; discussed caffeine's effect on deep   -set wake up time - feels he can commit to 9A; set alarm   -discussed structure to each day to avoid/limit dozing  -will work with pcp to potentially come off the Klonopin, especially day time dose

## 2025-04-01 NOTE — PATIENT INSTRUCTIONS
[facility] Sleep Medicine  [facility] [address] [place] [address]  [address] [facility] [address]       NAME: Marnie Jha   DATE: 04/01/25    Your Sleep Provider Today: Gianna Carranza PA-C  Your Primary Care Physician: Tre Benavidez DO   Your Referring Provider: Tre Benavidez DO    DIAGNOSIS:   1. ROBERT (obstructive sleep apnea)  Referral to Adult Sleep Medicine    Referral to Adult Sleep Medicine          Thank you for coming to the Sleep Medicine Clinic today! Your sleep medicine provider today was: Gianna Carranza PA-C Below is a summary of your treatment plan, other important information, and our contact numbers:      TREATMENT PLAN     -work with your primary care doctor on weaning off Clonazepam; especially the daytime dose  -*Please cut coke back to no more than 1-3 servings per day; none ideally after noon  -*we need to set a standard wake up time -9A - set an alarm   -bed time 10P-MN   -no phone/tv in bed, lights off, consider black out curtains   -bring you cpap to clinic with you next visit- lets reassess this     Additional tips below:  INSOMNIA, or trouble falling asleep or staying asleep, can be caused by many different things such as untreated sleep apnea, anxiety, depression, stress, poor sleep habits and other medical conditions or medications. The best way to treat insomnia is to treat the cause. In general, we can all benefit from better sleep hygiene. Some recommendations to help you improve your sleep hygiene so you can fall asleep, stay asleep, and wake up felling refreshed include:    1. Wake up and get out of bed the same time every day, even on weekends or non-work days. Whether you have good or poor sleep, waking up at the same time every day is important.      2. Go to bed when you are sleepy, but not before your goal bedtime. Long periods of time in bed will lead to fragmented, shallow, broken sleep.     3. Use the bed for sleep and intimacy only. Do not watch  TV, eat, read or use phone/laptop in bed. Keeping sleep as the only activity in bed will help re-associate bed=sleep.    4. Get up when you can't sleep (greater than 15 minutes). When you are unable to sleep, exit the bed and go to another room or chair in bedroom, do something relaxing/distracting/non-stimulating until you feel sleepy enough to fall back asleep.     5. Avoid napping during the day. Napping, particularly in the late afternoon or early evening may interfere with your night's sleep.    6. Create a buffer zone. This is a quiet time prior to bedtime, typically 30-60 mins. is beneficial for most people. During this time, you should do things that are enjoyable, relaxing and not necessarily goal-oriented.    7. Don't worry or plan in bed. If you are worrying, planning, feeling anxious or can't shut off your thoughts, get up and stay out of bed until you have quieted your mind. Make a list, write your worries down for the morning time.    8. Other Healthy Sleep Habits   -Turn the clock around   -Limit caffeine and consume before noon   -Limit alcohol and avoid within 2 hours of bedtime   -Exercise regularly but not close to bedtime   -Keep bedroom quiet, dark and cool   -Avoid eating a heavy meal close to bedtime          Return to clinic 3 months       IMPORTANT INFORMATION     Call 911 for medical emergencies.  Our offices are generally open from Monday-Friday, 9 am - 5 pm.  If you need to get in touch with me, you may either call me and my team(number is below) or you can use [employer].  If a referral for a test, for CPAP, or for another specialist was made, and you have not heard about scheduling this within a week, please call scheduling at [phone] ([phone]).  If you are unable to make your appointment for clinic or an overnight study, kindly call the office at least 48 hours in advance to cancel and reschedule.  If you are on CPAP, please bring your device's card or the device to each clinic  appointment.   There are no supporting services by either the sleep doctors or their staff on weekends and Holidays, or after 5 PM on weekdays.   If you have been asked to come to a sleep study, make sure you bring toiletries, a comfy pillow, and any nighttime medications that you may regularly take. Also be sure to eat dinner before you arrive. We generally do not provide meals.      PRESCRIPTIONS     We require 7 days advanced notice for prescription refills. If we do not receive the request in this time, we cannot guarantee that your medication will be refilled in time.      IMPORTANT PHONE NUMBERS     Sleep Medicine Clinic Fax: [phone]  Appointments (for Adult Sleep Clinic): [phone] - option 2  Appointments (For Sleep Studies): [phone] - option 3  [facility] (DME): [phone]  Behavioral Sleep Medicine: [phone]  Sleep Surgery: [phone]  ENT (Otolaryngology): [phone]  Headache Clinic (Neurology): [phone]  Neurology: [phone]  Psychiatry: [phone]  Pulmonary Function Testing (PFT) Center: [phone]  Pulmonary Medicine: [phone]    [facility] ([place]): [phone]  [facility] (DME): [phone]  [facility] ([facility]): [phone]      OUR ADULT SLEEP MEDICINE TEAM   Please do not hesitate to call the office or sleep nurse with any questions between appointments:    Adult Sleep Nurses (Cara Turner, LUCINA and Radha Gutierrez RN):  For clinical questions and refilling prescriptions: [phone]  Email sleep diaries and other documents at: [email or web address]    Adult Sleep Medicine Secretaries:  Tasha Jewell (For Matt/Boykin/Krise/Strohl/Yemartin/Bernardo):   P: [phone]  F: [phone]  Keiko Darden (For Leal/[place]biller): P: [phone]  Fax: [phone]  Nathalie Calabrese (For Jurcevic/Blank): P: [phone]  F: [phone]  Dinora Issa (For [place]): P: [phone]  F:  [phone]  Hanna Eric (For Marilee/Juani/[place]inary): P: [phone]  F: [phone]  Clari Hair (For Ayush/Marco Antonio): P: [phone]  F: [phone]     Adult Sleep Medicine Advanced Practice Providers:  Norman Hayward ([place], [place])  Vera Gloria ([facility])  Ailyn Celeste CNP ([place], [place], [place])  Lianet Carranza CNP ([place], [place], [place])  Ace Bernard CNP ([place], [place])        OUR SLEEP TESTING LOCATIONS     Our team will contact you to schedule your sleep study, however, you can contact us as follow:  Main Phone Line (scheduling only): [phone]), option 3  Adult and Pediatric Locations  [facility] (6 years and older): [facility] - 4th floor ([address] [facility]) After hours line: [phone]  [facility] (Main campus: All ages): [facility], 6th floor. After hours line: [phone]   [place] (5 years and older; younger considered on case-by-case basis): [address], [address]. Scheduling  After hours line: [phone]   [place] (6 years and older): [address]; [address]; [address]   [place] (6 years and older): [address] [facility], [address]  After hours line: [phone]   [place] (13 years and older) in [place]: [address], 2nd floor  After hours line: [phone]   [place] (13 year and older): [address] [facility] [address]  After hours line: [phone]     Adult Only Locations:   [place] (18 years and older): [address] [facility], 2nd floor   [place] (18 years and older): [address] [facility]; 4th floor  After hours line: [phone]  [facility] (18 years and older) at [place]: [address] [facility]  After hours line: [phone]          CONTACTING YOUR SLEEP MEDICINE PROVIDER     Send a message directly to your provider through \"My Chart\", which is the email service through your  Records " "Account: [email or web address]   Call [phone] and leave a message. One of the administrative assistants will forward the message to your sleep medicine provider through \"My Chart\" and/or email.     Your sleep medicine provider for this visit was: Gianna Carranza PA-C    "

## 2025-04-01 NOTE — PROGRESS NOTES
Patient: Marnie Jha    [id number]  : 1955 -- AGE 69 y.o.    Provider: Gianna Carranza PA-C     Location [facility] 1   Service Date: 2025              [facility]  New Visit Note      HISTORY OF PRESENT ILLNESS     The patient's referring provider is: Tre Benavidez DO; Tre Benavidez DO    HISTORY OF PRESENT ILLNESS   Marnie Jha is a 69 y.o. male with h/o ROBERT, emphysema, HTN, CABG, TAA, multiple comorbidites who presents to a [facility] for a sleep medicine evaluation with concerns of Sleeping Problem.     Past Sleep History  HSAT 10-  REI3%- 26.2  REI4%- 20.4  O2 delores- 73%; T88- 66 minutes      Current History    On today's visit, the patient reports he has a poor sleep pattern. Spends a lot of time in  bed. Wake time is highly variable. Sleeps on and off through that time period. Turns on tv/phone in middle of the night.   Last had a regular sleep schedule 5-6 years ago when he was working full time; was sleeping more regularly and able to stay awake much better during the day at that time. States he remembers being tired at sometimes and maybe taking naps at times but not nearly as bad as it is now. He often becomes tired during the day and dozes off.   Dignosed with moderate sleep apnea, did not give cpap much of a try. Willing to reconsider. Did not bring equipment with him today.   Does have symptoms of snoring loudly, pauses in breathing, nocturia, dry mouth and headaches in the morning.   Denies RLS. Denies dream enactment, sleep walking or other parasomnias including sleep hallucination or sleep paralysis.     Is on Clonazpam 1mg AM and 1mg PM- he is not exactly sure why but likely due to anxiety, prescribed by a previous PCP. States he is working on trying to wean down but it is been hard for him.    Did try Modafinil during the day but did not feel it was helpful, prescribed recently by current  PCP.    He admits to drinking coke all day long, up to 10 cans per day. Will sip on coke up until bedtime and even drink it in the middle of the night sometimes if he wakes up and feels his mouth is dry.     Denies alcohol or marijuana use.      Sleep schedule:  Works part time at the [employer]; works 3 days/week 4-7:30/8p  In bed: 8P to rest - on TV/phone   Subjective sleep latency:  varies   Awakenings during night:  multiple - unsure what wakes him up   Length of awakenings:  may be prolonged - sometimes gets phone back out or watching TV   Final awakening time: 5:30-6A but often back to sleep until as late as noon  Naps: random times, more so dozing     Overall estimate of total sleep time: unsure- does not feel he can estimate   Weekends/Days off: same     Preferred sleeping position: SLEEP POSITION: supine      ESS:  16  ARIE:  21  FOSQ: 20      REVIEW OF SYSTEMS     REVIEW OF SYSTEMS  See HPI; all other ROS were reviewed and negative for compliant        ALLERGIES AND MEDICATIONS     ALLERGIES  Allergies   Allergen Reactions    Demerol [Meperidine] Nausea/vomiting       MEDICATIONS  Current Outpatient Medications   Medication Sig Dispense Refill    amLODIPine (Norvasc) 5 mg tablet TAKE ONE TABLET BY MOUTH EVERY DAY 90 tablet 1    aspirin 81 mg EC tablet Take 1 tablet (81 mg) by mouth once daily.      atorvastatin (Lipitor) 80 mg tablet TAKE ONE TABLET BY MOUTH EVERY DAY 90 tablet 1    buPROPion SR (Wellbutrin SR) 150 mg 12 hr tablet TAKE ONE TABLET BY MOUTH TWO TIMES A DAY . DO NOT CRUSH, CHEW OR SPLIT 60 tablet 3    citalopram (CeleXA) 40 mg tablet TAKE ONE TABLET BY MOUTH EVERY DAY 90 tablet 1    clonazePAM (KlonoPIN) 1 mg tablet Take 1 tablet (1 mg) by mouth 2 times a day. 60 tablet 2    esomeprazole (NexIUM) 20 mg DR capsule Take 1 capsule (20 mg) by mouth 2 times a day. Do not open capsule.      famotidine (Pepcid) 20 mg tablet Take 1 tablet (20 mg) by mouth as needed at bedtime for heartburn.       "gabapentin (Neurontin) 300 mg capsule TAKE ONE CAPSULE BY MOUTH TWICE A  capsule 0    lisinopril 20 mg tablet TAKE ONE TABLET BY MOUTH EVERY DAY 90 tablet 1    metoprolol succinate XL (Toprol-XL) 25 mg 24 hr tablet TAKE ONE TABLET BY MOUTH EVERY DAY 90 tablet 1    modafinil (ProvigiL) 200 mg tablet Take 1 tablet (200 mg) by mouth once daily for 14 days. 14 tablet 0     No current facility-administered medications for this visit.         PAST HISTORY     PAST MEDICAL HISTORY  He  has a past medical history of Personal history of other diseases of the circulatory system and Personal history of other specified conditions.    PAST SURGICAL HISTORY:  Past Surgical History:   Procedure Laterality Date    CT ANGIO NECK  3/19/2022    CT NECK ANGIO W AND WO IV CONTRAST 3/19/2022 [facility]    CT HEAD ANGIO W AND WO IV CONTRAST  3/19/2022    CT HEAD ANGIO W AND WO IV CONTRAST 3/19/2022 [facility]    OTHER SURGICAL HISTORY  01/17/2020    Cardiac catheterization    OTHER SURGICAL HISTORY  01/17/2020    Heart surgery    OTHER SURGICAL HISTORY  07/30/2021    Laparoscopic sigmoidectomy       FAMILY HISTORY  Family History   Problem Relation Name Age of Onset    Hypertension Mother      Hypertension Father      Other (cardiac disorder) Father      Coronary artery disease Father         He does not have a family history of sleep disorder.    SOCIAL HISTORY  He  reports that he has never smoked. He has been exposed to tobacco smoke. He has never used smokeless tobacco. He reports that he does not currently use alcohol. He reports that he does not currently use drugs. He    Caffeine consumption: Yes, Patient consumes caffeine beverage regularly, about 6-10 cup(s)/day.  Alcohol consumption: No  Marijuana: No      PHYSICAL EXAM     VITAL SIGNS: BP (!) 128/93 (BP Location: Right arm, Patient Position: Sitting, BP Cuff Size: Large adult)   Pulse 72   Temp 36.4 °C (97.6 °F)   Ht 1.626 m (5' 4\")   Wt 86.2 kg " (190 lb)   SpO2 98%   BMI 32.61 kg/m²      CURRENT WEIGHT:   Vitals:    04/01/25 1010   Weight: 86.2 kg (190 lb)     Body mass index is 32.61 kg/m².  PREVIOUS WEIGHTS:  Wt Readings from Last 3 Encounters:   04/01/25 86.2 kg (190 lb)   02/04/25 86.5 kg (190 lb 11.2 oz)   01/28/25 85.3 kg (188 lb)       Constitutional: Alert and oriented, cooperative, no acute distress  Head: Normocephalic, atraumatic   Cranial Features: No abnormal craniofacial features  Neck: Supple. Trachea midline.  Pulmonary: Non-labored breathing, speaks in full sentences.   Cardiac: regular rate   Extremities: No clubbing, no edema  Neuromuscular: Cranial nerves grossly intact, no focal deficits      RESULTS/DATA     Bicarbonate (mmol/L)   Date Value   01/29/2025 25   12/22/2024 28   12/21/2024 23     Iron (ug/dL)   Date Value   11/05/2020 28 (L)     Iron Saturation (%)   Date Value   11/05/2020 7 (L)     TIBC (ug/dL)   Date Value   11/05/2020 389     Ferritin (ug/L)   Date Value   11/09/2020 225             ASSESSMENT/PLAN     Mr. Jha is a 69 y.o. male and He was referred to the [facility] for evaluation of ROBERT and Insomnia    Problem List, Orders, Assessment, Recommendations:  Problem List Items Addressed This Visit             ICD-10-CM    Obstructive sleep apnea syndrome - Primary G47.33     -moderate on testing last fall  -has cpap at home not using, did not give much of a try  -discussed impact on sleep quality of untreated sleep apnea and likely a cause of some of his sleep maintenance issues  -discussed avoid drowsy driving or any activities that could be dangerous/require high level of concentration when drowsy    **instructed to bring all equipment to next visit; likely best to try to get his sleep schedule in order first before trying to use again but we will troubleshoot next visit in clinic          Other insomnia G47.09     -ongoing for years, significantly worse with [facility]   -very poor  sleep hygiene --> advised against blue light emitting electronics 1-2 hours before bed and while in bed  -reduce caffeine intake; currently t 6-10 cokes per day --> reduce by 1-2 servings/week with goal no more than 2 per day before noon; discussed caffeine's effect on deep   -set wake up time - feels he can commit to 9A; set alarm   -discussed structure to each day to avoid/limit dozing  -will work with pcp to potentially come off the Klonopin, especially day time dose          Other Visit Diagnoses         Codes    ROBERT (obstructive sleep apnea)     G47.33            Disposition    Return to clinic in 2 months

## 2025-04-10 ENCOUNTER — APPOINTMENT (OUTPATIENT)
Dept: PHARMACY | Facility: HOSPITAL | Age: 70
End: 2025-04-10
Payer: MEDICARE

## 2025-05-08 DIAGNOSIS — J43.9 PULMONARY EMPHYSEMA, UNSPECIFIED EMPHYSEMA TYPE (MULTI): Primary | ICD-10-CM

## 2025-05-08 DIAGNOSIS — G47.33 OBSTRUCTIVE SLEEP APNEA SYNDROME: ICD-10-CM

## 2025-05-12 PROBLEM — E66.01 MORBID (SEVERE) OBESITY DUE TO EXCESS CALORIES (MULTI): Status: ACTIVE | Noted: 2025-05-12

## 2025-05-12 PROBLEM — U07.1 DISEASE DUE TO SEVERE ACUTE RESPIRATORY SYNDROME CORONAVIRUS 2 (SARS-COV-2): Status: RESOLVED | Noted: 2024-02-07 | Resolved: 2025-05-12

## 2025-05-12 PROBLEM — G47.00 INSOMNIA, UNSPECIFIED: Status: ACTIVE | Noted: 2025-04-01

## 2025-05-12 PROBLEM — E66.811 CLASS 1 OBESITY WITH BODY MASS INDEX (BMI) OF 32.0 TO 32.9 IN ADULT: Status: ACTIVE | Noted: 2025-05-12

## 2025-06-02 ENCOUNTER — HOSPITAL ENCOUNTER (OUTPATIENT)
Dept: CARDIOLOGY | Facility: CLINIC | Age: 70
Discharge: HOME | End: 2025-06-02
Payer: MEDICARE

## 2025-06-02 DIAGNOSIS — Z95.0 CARDIAC PACEMAKER IN SITU: ICD-10-CM

## 2025-06-02 DIAGNOSIS — I49.5 SICK SINUS SYNDROME (MULTI): ICD-10-CM

## 2025-06-02 PROCEDURE — 93296 REM INTERROG EVL PM/IDS: CPT

## 2025-06-03 ENCOUNTER — OFFICE VISIT (OUTPATIENT)
Dept: CARDIOLOGY | Facility: CLINIC | Age: 70
End: 2025-06-03
Payer: MEDICARE

## 2025-06-03 VITALS
OXYGEN SATURATION: 96 % | WEIGHT: 189.8 LBS | DIASTOLIC BLOOD PRESSURE: 86 MMHG | HEART RATE: 88 BPM | HEIGHT: 64 IN | BODY MASS INDEX: 32.4 KG/M2 | SYSTOLIC BLOOD PRESSURE: 110 MMHG

## 2025-06-03 DIAGNOSIS — E66.811 CLASS 1 OBESITY WITHOUT SERIOUS COMORBIDITY WITH BODY MASS INDEX (BMI) OF 32.0 TO 32.9 IN ADULT, UNSPECIFIED OBESITY TYPE: ICD-10-CM

## 2025-06-03 DIAGNOSIS — E78.00 PURE HYPERCHOLESTEROLEMIA: ICD-10-CM

## 2025-06-03 DIAGNOSIS — Z95.1 S/P CABG X 4: ICD-10-CM

## 2025-06-03 DIAGNOSIS — I71.21 ANEURYSM OF ASCENDING AORTA WITHOUT RUPTURE: ICD-10-CM

## 2025-06-03 DIAGNOSIS — I10 PRIMARY HYPERTENSION: ICD-10-CM

## 2025-06-03 DIAGNOSIS — G47.33 OBSTRUCTIVE SLEEP APNEA SYNDROME: ICD-10-CM

## 2025-06-03 DIAGNOSIS — R06.02 SHORTNESS OF BREATH: ICD-10-CM

## 2025-06-03 DIAGNOSIS — I77.810 ASCENDING AORTA DILATATION: ICD-10-CM

## 2025-06-03 DIAGNOSIS — I25.10 CORONARY ARTERY DISEASE INVOLVING NATIVE CORONARY ARTERY OF NATIVE HEART WITHOUT ANGINA PECTORIS: Primary | ICD-10-CM

## 2025-06-03 DIAGNOSIS — I49.5 SICK SINUS SYNDROME (MULTI): ICD-10-CM

## 2025-06-03 LAB
ATRIAL RATE: 75 BPM
P AXIS: 69 DEGREES
P OFFSET: 186 MS
P ONSET: 136 MS
PR INTERVAL: 202 MS
Q ONSET: 214 MS
QRS COUNT: 12 BEATS
QRS DURATION: 98 MS
QT INTERVAL: 410 MS
QTC CALCULATION(BAZETT): 457 MS
QTC FREDERICIA: 441 MS
R AXIS: -11 DEGREES
T AXIS: 92 DEGREES
T OFFSET: 419 MS
VENTRICULAR RATE: 75 BPM

## 2025-06-03 PROCEDURE — 93010 ELECTROCARDIOGRAM REPORT: CPT | Performed by: INTERNAL MEDICINE

## 2025-06-03 PROCEDURE — 3079F DIAST BP 80-89 MM HG: CPT | Performed by: INTERNAL MEDICINE

## 2025-06-03 PROCEDURE — 99215 OFFICE O/P EST HI 40 MIN: CPT | Performed by: INTERNAL MEDICINE

## 2025-06-03 PROCEDURE — 1159F MED LIST DOCD IN RCRD: CPT | Performed by: INTERNAL MEDICINE

## 2025-06-03 PROCEDURE — 3008F BODY MASS INDEX DOCD: CPT | Performed by: INTERNAL MEDICINE

## 2025-06-03 PROCEDURE — 3074F SYST BP LT 130 MM HG: CPT | Performed by: INTERNAL MEDICINE

## 2025-06-03 PROCEDURE — 93005 ELECTROCARDIOGRAM TRACING: CPT | Performed by: INTERNAL MEDICINE

## 2025-06-03 PROCEDURE — 1160F RVW MEDS BY RX/DR IN RCRD: CPT | Performed by: INTERNAL MEDICINE

## 2025-06-03 PROCEDURE — 99212 OFFICE O/P EST SF 10 MIN: CPT | Performed by: INTERNAL MEDICINE

## 2025-06-03 RX ORDER — AZITHROMYCIN 250 MG/1
TABLET, FILM COATED ORAL
Qty: 6 TABLET | Refills: 0 | Status: SHIPPED | OUTPATIENT
Start: 2025-06-03 | End: 2025-06-08

## 2025-06-03 NOTE — PROGRESS NOTES
"  Subjective  Marnie Jha  is a 70 y.o. year old male who presents for dyspnea worse whenever recumbent. He has had a lot of phlegm with cough mostly at night.  No chest pain, no palpitations, no edema.  To go for CT scan  of aorta in July    Blood pressure 110/86, pulse 88, height 1.626 m (5' 4\"), weight 86.1 kg (189 lb 12.8 oz), SpO2 96%.   Demerol [meperidine]  Medical History[1]  Surgical History[2]  Family History[3]  @SOC    Current Medications[4]     ROS  Review of Systems   All other systems reviewed and are negative.      Physical Exam  Physical Exam  Constitutional:       Appearance: He is obese.   HENT:      Head: Normocephalic and atraumatic.   Pulmonary:      Effort: Pulmonary effort is normal.      Breath sounds: Normal breath sounds.   Abdominal:      Palpations: Abdomen is soft.   Musculoskeletal:      Right lower leg: No edema.      Left lower leg: No edema.   Skin:     General: Skin is warm and dry.   Neurological:      General: No focal deficit present.      Mental Status: He is alert and oriented to person, place, and time.   Psychiatric:         Mood and Affect: Mood normal.         Behavior: Behavior normal.          EKG  Encounter Date: 06/03/25   ECG 12 Lead   Result Value    Ventricular Rate 75    Atrial Rate 75    FL Interval 202    QRS Duration 98    QT Interval 410    QTC Calculation(Bazett) 457    P Axis 69    R Axis -11    T Axis 92    QRS Count 12    Q Onset 214    P Onset 136    P Offset 186    T Offset 419    QTC Fredericia 441    Narrative    Atrial-paced rhythm  Incomplete right bundle branch block  Inferior infarct , age undetermined  ST & T wave abnormality, consider anterolateral ischemia  Abnormal ECG  When compared with ECG of 21-DEC-2024 22:42,  PREVIOUS ECG IS PRESENT  Confirmed by Bassem Méndez ([email or web address]) on 6/3/2025 4:31:05 PM       Problem List Items Addressed This Visit       CAD (coronary artery disease) - Primary    11/19/24 Lexiscan normal ST resonse with normal scan " and LVEf = 71%         Relevant Orders    ECG 12 Lead (Completed)    Follow Up In Cardiology    HTN (hypertension)    Hyperlipidemia    Sick sinus syndrome (Multi)    S/P PPM         S/P CABG x 4    [facility] 4/3/19 Dr. Jong CUNHA to LAD, Left rad. To PDA and distal PDA sequential SVG to distal OMG         Ascending aorta dilatation    Thoracic aortic aneurysm, without rupture, unspecified    Obstructive sleep apnea syndrome    Class 1 obesity with body mass index (BMI) of 32.0 to 32.9 in adult    Shortness of breath    Relevant Medications    azithromycin (Zithromax Z-Tyson) 250 mg tablet    Other Relevant Orders    CBC and Auto Differential    Basic metabolic panel    B-Type Natriuretic Peptide    XR chest 2 views    Transthoracic echo (TTE) complete    Follow Up In Cardiology         CBC, BMP, BNP  CXR  Echocardiogram  Zpack  Return 1 month      Bassem Méndez MD          [1]   Past Medical History:  Diagnosis Date    Personal history of other diseases of the circulatory system     History of cardiac disorder    Personal history of other specified conditions     History of chest pain   [2]   Past Surgical History:  Procedure Laterality Date    CT ANGIO NECK  3/19/2022    CT NECK ANGIO W AND WO IV CONTRAST 3/19/2022 [facility]    CT HEAD ANGIO W AND WO IV CONTRAST  3/19/2022    CT HEAD ANGIO W AND WO IV CONTRAST 3/19/2022 [facility]    OTHER SURGICAL HISTORY  01/17/2020    Cardiac catheterization    OTHER SURGICAL HISTORY  01/17/2020    Heart surgery    OTHER SURGICAL HISTORY  07/30/2021    Laparoscopic sigmoidectomy   [3]   Family History  Problem Relation Name Age of Onset    Hypertension Mother      Hypertension Father      Other (cardiac disorder) Father      Coronary artery disease Father     [4]   Current Outpatient Medications   Medication Sig Dispense Refill    amLODIPine (Norvasc) 5 mg tablet TAKE ONE TABLET BY MOUTH EVERY DAY 90 tablet 1    aspirin 81 mg EC tablet Take 1 tablet (81 mg) by  mouth once daily.      atorvastatin (Lipitor) 80 mg tablet TAKE ONE TABLET BY MOUTH EVERY DAY 90 tablet 1    azithromycin (Zithromax Z-Tyson) 250 mg tablet On day 1, take 2 tablets (500 mg total) by mouth as a single dose. On days 2 through 5, you'll take just 1 tablet (250 mg) per day. 6 tablet 0    buPROPion SR (Wellbutrin SR) 150 mg 12 hr tablet TAKE ONE TABLET BY MOUTH TWO TIMES A DAY . DO NOT CRUSH, CHEW OR SPLIT 60 tablet 3    citalopram (CeleXA) 40 mg tablet TAKE ONE TABLET BY MOUTH EVERY DAY 90 tablet 1    clonazePAM (KlonoPIN) 1 mg tablet Take 1 tablet (1 mg) by mouth 2 times a day. 60 tablet 2    esomeprazole (NexIUM) 20 mg DR capsule Take 1 capsule (20 mg) by mouth 2 times a day. Do not open capsule.      famotidine (Pepcid) 20 mg tablet Take 1 tablet (20 mg) by mouth as needed at bedtime for heartburn.      gabapentin (Neurontin) 300 mg capsule TAKE ONE CAPSULE BY MOUTH TWICE A  capsule 0    lisinopril 20 mg tablet TAKE ONE TABLET BY MOUTH EVERY DAY 90 tablet 1    metoprolol succinate XL (Toprol-XL) 25 mg 24 hr tablet TAKE ONE TABLET BY MOUTH EVERY DAY 90 tablet 1    modafinil (ProvigiL) 200 mg tablet Take 1 tablet (200 mg) by mouth once daily for 14 days. 14 tablet 0     No current facility-administered medications for this visit.

## 2025-06-04 NOTE — ASSESSMENT & PLAN NOTE
[facility] 4/3/19 Dr. Jong CUNHA to LAD, Left rad. To PDA and distal PDA sequential SVG to distal OMG

## 2025-06-06 ENCOUNTER — HOSPITAL ENCOUNTER (OUTPATIENT)
Dept: RADIOLOGY | Facility: HOSPITAL | Age: 70
Discharge: HOME | End: 2025-06-06
Payer: MEDICARE

## 2025-06-06 DIAGNOSIS — R06.02 SHORTNESS OF BREATH: ICD-10-CM

## 2025-06-06 LAB
ANION GAP SERPL CALCULATED.4IONS-SCNC: 9 MMOL/L (CALC) (ref 7–17)
BASOPHILS # BLD AUTO: 38 CELLS/UL (ref 0–200)
BASOPHILS NFR BLD AUTO: 0.6 %
BNP SERPL-MCNC: 65 PG/ML
BUN SERPL-MCNC: 33 MG/DL (ref 7–25)
BUN/CREAT SERPL: 21 (CALC) (ref 6–22)
CALCIUM SERPL-MCNC: 9.1 MG/DL (ref 8.6–10.3)
CHLORIDE SERPL-SCNC: 103 MMOL/L (ref 98–110)
CO2 SERPL-SCNC: 23 MMOL/L (ref 20–32)
CREAT SERPL-MCNC: 1.54 MG/DL (ref 0.7–1.35)
EGFRCR SERPLBLD CKD-EPI 2021: 49 ML/MIN/1.73M2
EOSINOPHIL # BLD AUTO: 252 CELLS/UL (ref 15–500)
EOSINOPHIL NFR BLD AUTO: 4 %
ERYTHROCYTE [DISTWIDTH] IN BLOOD BY AUTOMATED COUNT: 12.7 % (ref 11–15)
GLUCOSE SERPL-MCNC: 102 MG/DL (ref 65–99)
HCT VFR BLD AUTO: 42.8 % (ref 38.5–50)
HGB BLD-MCNC: 14.3 G/DL (ref 13.2–17.1)
LYMPHOCYTES # BLD AUTO: 2608 CELLS/UL (ref 850–3900)
LYMPHOCYTES NFR BLD AUTO: 41.4 %
MCH RBC QN AUTO: 31.8 PG (ref 27–33)
MCHC RBC AUTO-ENTMCNC: 33.4 G/DL (ref 32–36)
MCV RBC AUTO: 95.3 FL (ref 80–100)
MONOCYTES # BLD AUTO: 592 CELLS/UL (ref 200–950)
MONOCYTES NFR BLD AUTO: 9.4 %
NEUTROPHILS # BLD AUTO: 2810 CELLS/UL (ref 1500–7800)
NEUTROPHILS NFR BLD AUTO: 44.6 %
PLATELET # BLD AUTO: 202 THOUSAND/UL (ref 140–400)
PMV BLD REES-ECKER: 9.4 FL (ref 7.5–12.5)
POTASSIUM SERPL-SCNC: 4.2 MMOL/L (ref 3.5–5.3)
RBC # BLD AUTO: 4.49 MILLION/UL (ref 4.2–5.8)
SODIUM SERPL-SCNC: 135 MMOL/L (ref 135–146)
WBC # BLD AUTO: 6.3 THOUSAND/UL (ref 3.8–10.8)

## 2025-06-06 PROCEDURE — 71046 X-RAY EXAM CHEST 2 VIEWS: CPT | Performed by: RADIOLOGY

## 2025-06-06 PROCEDURE — 71046 X-RAY EXAM CHEST 2 VIEWS: CPT

## 2025-06-09 ENCOUNTER — TELEPHONE (OUTPATIENT)
Dept: PRIMARY CARE | Facility: CLINIC | Age: 70
End: 2025-06-09
Payer: MEDICARE

## 2025-06-09 DIAGNOSIS — F41.9 ANXIETY: ICD-10-CM

## 2025-06-09 RX ORDER — CLONAZEPAM 1 MG/1
1 TABLET ORAL 2 TIMES DAILY
Qty: 60 TABLET | Refills: 2 | OUTPATIENT
Start: 2025-06-09

## 2025-06-09 RX ORDER — CLONAZEPAM 1 MG/1
1 TABLET ORAL 2 TIMES DAILY
Qty: 60 TABLET | Refills: 2 | Status: SHIPPED | OUTPATIENT
Start: 2025-06-09

## 2025-06-09 NOTE — PROGRESS NOTES
Subjective   Reason for Visit: Marnie Jha is an 70 y.o. male here for a Medicare Wellness visit.               HPI    Patient Care Team:  Tre Benavidez DO as PCP - General (Internal Medicine)  Bassem Méndez MD as Consulting Physician (Cardiology)  Jayant Pineda MD as Consulting Physician (Hematology and Oncology)  Kylah LópezD as Pharmacist (Pharmacy)     Review of Systems    Objective   Vitals:  There were no vitals taken for this visit.      Physical Exam    Assessment & Plan

## 2025-06-10 ENCOUNTER — APPOINTMENT (OUTPATIENT)
Dept: PRIMARY CARE | Facility: CLINIC | Age: 70
End: 2025-06-10
Payer: MEDICARE

## 2025-06-11 ENCOUNTER — HOSPITAL ENCOUNTER (OUTPATIENT)
Dept: CARDIOLOGY | Facility: CLINIC | Age: 70
Discharge: HOME | End: 2025-06-11
Payer: MEDICARE

## 2025-06-11 VITALS
HEIGHT: 64 IN | WEIGHT: 189 LBS | BODY MASS INDEX: 32.27 KG/M2 | SYSTOLIC BLOOD PRESSURE: 110 MMHG | DIASTOLIC BLOOD PRESSURE: 86 MMHG

## 2025-06-11 DIAGNOSIS — R06.02 SHORTNESS OF BREATH: ICD-10-CM

## 2025-06-11 LAB
AORTIC VALVE MEAN GRADIENT: 3 MMHG
AORTIC VALVE PEAK VELOCITY: 1.28 M/S
AV PEAK GRADIENT: 7 MMHG
EJECTION FRACTION APICAL 4 CHAMBER: 64.2
EJECTION FRACTION: 68 %
LEFT ATRIUM VOLUME AREA LENGTH INDEX BSA: 22 ML/M2
LEFT VENTRICLE INTERNAL DIMENSION DIASTOLE: 3.86 CM (ref 3.5–6)
MITRAL VALVE E/A RATIO: 0.79
RIGHT VENTRICLE FREE WALL PEAK S': 1 CM/S
RIGHT VENTRICLE PEAK SYSTOLIC PRESSURE: 26 MMHG
TRICUSPID ANNULAR PLANE SYSTOLIC EXCURSION: 1.7 CM

## 2025-06-11 PROCEDURE — 93306 TTE W/DOPPLER COMPLETE: CPT

## 2025-06-11 PROCEDURE — 93306 TTE W/DOPPLER COMPLETE: CPT | Performed by: INTERNAL MEDICINE

## 2025-06-12 ENCOUNTER — OFFICE VISIT (OUTPATIENT)
Dept: PRIMARY CARE | Facility: CLINIC | Age: 70
End: 2025-06-12
Payer: MEDICARE

## 2025-06-12 VITALS
WEIGHT: 190 LBS | DIASTOLIC BLOOD PRESSURE: 88 MMHG | BODY MASS INDEX: 32.61 KG/M2 | SYSTOLIC BLOOD PRESSURE: 132 MMHG | HEART RATE: 77 BPM | OXYGEN SATURATION: 97 %

## 2025-06-12 DIAGNOSIS — G47.33 OSA (OBSTRUCTIVE SLEEP APNEA): ICD-10-CM

## 2025-06-12 DIAGNOSIS — I77.810 AORTIC ROOT DILATION: ICD-10-CM

## 2025-06-12 DIAGNOSIS — F41.9 ANXIETY: Primary | ICD-10-CM

## 2025-06-12 PROCEDURE — 99214 OFFICE O/P EST MOD 30 MIN: CPT | Performed by: STUDENT IN AN ORGANIZED HEALTH CARE EDUCATION/TRAINING PROGRAM

## 2025-06-12 PROCEDURE — 3079F DIAST BP 80-89 MM HG: CPT | Performed by: STUDENT IN AN ORGANIZED HEALTH CARE EDUCATION/TRAINING PROGRAM

## 2025-06-12 PROCEDURE — 3075F SYST BP GE 130 - 139MM HG: CPT | Performed by: STUDENT IN AN ORGANIZED HEALTH CARE EDUCATION/TRAINING PROGRAM

## 2025-06-12 PROCEDURE — 1159F MED LIST DOCD IN RCRD: CPT | Performed by: STUDENT IN AN ORGANIZED HEALTH CARE EDUCATION/TRAINING PROGRAM

## 2025-06-12 PROCEDURE — 1036F TOBACCO NON-USER: CPT | Performed by: STUDENT IN AN ORGANIZED HEALTH CARE EDUCATION/TRAINING PROGRAM

## 2025-06-12 ASSESSMENT — PATIENT HEALTH QUESTIONNAIRE - PHQ9
1. LITTLE INTEREST OR PLEASURE IN DOING THINGS: NOT AT ALL
2. FEELING DOWN, DEPRESSED OR HOPELESS: SEVERAL DAYS
1. LITTLE INTEREST OR PLEASURE IN DOING THINGS: NOT AT ALL
SUM OF ALL RESPONSES TO PHQ9 QUESTIONS 1 AND 2: 0
2. FEELING DOWN, DEPRESSED OR HOPELESS: NOT AT ALL
SUM OF ALL RESPONSES TO PHQ9 QUESTIONS 1 AND 2: 1

## 2025-06-12 ASSESSMENT — ENCOUNTER SYMPTOMS: DEPRESSION: 1

## 2025-06-12 NOTE — PROGRESS NOTES
Subjective   Patient ID: Marnie Jha is a 70 y.o. male who presents for Follow-up (Medications /Recently happening: falling out of bed while sleeping ).    HPI     CABG: follows cardiology, stable no chest pain, complicated with palpations and pm moderate aortic dialation, 4.4 cm has follow up with Dr. Segal, bp well controlled, as ROBERT likely cause increase pressure causing his issues. Recommend wearing cpap     Colon cancer: in remission, follows oncology hx of surgery, and chemo, in remission     HTN: stable on BP meds     Anxiety: was given  old pcp has been on  it for 15-20 years, tried to get off by he couldn't so went back on it    OARRS:  No data recorded  I have personally reviewed the OARRS report for Marnie Jha. I have considered the risks of abuse, dependence, addiction and diversion    Is the patient prescribed a combination of a benzodiazepine and opioid?  No    Last Urine Drug Screen / ordered today: No  Recent Results (from the past 8760 hours)   Drug Screen, Urine    Collection Time: 12/22/24  2:35 PM   Result Value Ref Range    Amphetamine Screen, Urine Presumptive Negative Presumptive Negative    Barbiturate Screen, Urine Presumptive Negative Presumptive Negative    Benzodiazepines Screen, Urine Presumptive Negative Presumptive Negative    Cannabinoid Screen, Urine Presumptive Negative Presumptive Negative    Cocaine Metabolite Screen, Urine Presumptive Negative Presumptive Negative    Fentanyl Screen, Urine Presumptive Negative Presumptive Negative    Opiate Screen, Urine Presumptive Negative Presumptive Negative    Oxycodone Screen, Urine Presumptive Negative Presumptive Negative    PCP Screen, Urine Presumptive Negative Presumptive Negative    Methadone Screen, Urine Presumptive Negative Presumptive Negative     Results are as expected.           Controlled Substance Agreement:  Date of the Last Agreement: 2025  Reviewed Controlled Substance Agreement including but not limited to the  benefits, risks, and alternatives to treatment with a Controlled Substance medication(s).    Benzodiazepines:  What is the patient's goal of therapy? anxiety  Is this being achieved with current treatment? yes    MARIO-7:  No data recorded    Activities of Daily Living:   Is your overall impression that this patient is benefiting (symptom reduction outweighs side effects) from benzodiazepine therapy? Yes     1. Physical Functioning: Better  2. Family Relationship: Better  3. Social Relationship: Better  4. Mood: Better  5. Sleep Patterns: Better  6. Overall Function: Better    Review of Systems   All other systems reviewed and are negative.      Objective   /88 (BP Location: Right arm, Patient Position: Sitting, BP Cuff Size: Small adult)   Pulse 77   Wt 86.2 kg (190 lb)   SpO2 97%   BMI 32.61 kg/m²     Physical Exam  Constitutional:       Appearance: Normal appearance.   HENT:      Head: Normocephalic and atraumatic.      Right Ear: Tympanic membrane and ear canal normal.      Left Ear: Tympanic membrane and ear canal normal.      Mouth/Throat:      Mouth: Mucous membranes are moist.      Pharynx: Oropharynx is clear.   Eyes:      Extraocular Movements: Extraocular movements intact.      Conjunctiva/sclera: Conjunctivae normal.      Pupils: Pupils are equal, round, and reactive to light.   Cardiovascular:      Rate and Rhythm: Normal rate and regular rhythm.      Pulses: Normal pulses.      Heart sounds: Normal heart sounds.   Pulmonary:      Effort: Pulmonary effort is normal.      Breath sounds: Normal breath sounds.   Abdominal:      General: Abdomen is flat. Bowel sounds are normal.      Palpations: Abdomen is soft.   Musculoskeletal:         General: Normal range of motion.      Cervical back: Normal range of motion and neck supple.   Skin:     General: Skin is warm and dry.      Capillary Refill: Capillary refill takes 2 to 3 seconds.   Neurological:      General: No focal deficit present.      Mental  Status: He is alert and oriented to person, place, and time. Mental status is at baseline.   Psychiatric:         Mood and Affect: Mood normal.         Behavior: Behavior normal.         Thought Content: Thought content normal.         Judgment: Judgment normal.         Assessment/Plan   1. Anxiety (Primary)  Stable  Refills given  Oarrs reviewd  Csa on file  Uds on fiel    2. ROBERT (obstructive sleep apnea)  - drinks 6-10 cokes a day, won't reduced educated on this  - agree with sleep specialist on reduction  - encouraged used of cpap, not using, likely causing the aortic dilation    3. Aortic root dilation  - follow up with cardiology  - sees Dr. eSgal

## 2025-06-23 ENCOUNTER — APPOINTMENT (OUTPATIENT)
Dept: PHARMACY | Facility: HOSPITAL | Age: 70
End: 2025-06-23
Payer: MEDICARE

## 2025-07-01 ENCOUNTER — APPOINTMENT (OUTPATIENT)
Dept: PRIMARY CARE | Facility: CLINIC | Age: 70
End: 2025-07-01
Payer: MEDICARE

## 2025-07-03 DIAGNOSIS — I48.0 PAROXYSMAL ATRIAL FIBRILLATION (MULTI): ICD-10-CM

## 2025-07-03 DIAGNOSIS — F32.A DEPRESSION, UNSPECIFIED DEPRESSION TYPE: ICD-10-CM

## 2025-07-03 DIAGNOSIS — F41.9 ANXIETY: ICD-10-CM

## 2025-07-03 RX ORDER — ATORVASTATIN CALCIUM 80 MG/1
80 TABLET, FILM COATED ORAL DAILY
Qty: 90 TABLET | Refills: 1 | Status: SHIPPED | OUTPATIENT
Start: 2025-07-03

## 2025-07-03 RX ORDER — BUPROPION HYDROCHLORIDE 150 MG/1
150 TABLET, EXTENDED RELEASE ORAL 2 TIMES DAILY
Qty: 60 TABLET | Refills: 3 | Status: SHIPPED | OUTPATIENT
Start: 2025-07-03

## 2025-07-03 RX ORDER — METOPROLOL SUCCINATE 25 MG/1
25 TABLET, EXTENDED RELEASE ORAL DAILY
Qty: 90 TABLET | Refills: 1 | Status: SHIPPED | OUTPATIENT
Start: 2025-07-03

## 2025-07-08 ENCOUNTER — HOSPITAL ENCOUNTER (OUTPATIENT)
Dept: RADIOLOGY | Facility: HOSPITAL | Age: 70
Discharge: HOME | End: 2025-07-08
Payer: MEDICARE

## 2025-07-08 DIAGNOSIS — I71.21 ANEURYSM OF ASCENDING AORTA WITHOUT RUPTURE: ICD-10-CM

## 2025-07-08 PROCEDURE — 71275 CT ANGIOGRAPHY CHEST: CPT | Performed by: RADIOLOGY

## 2025-07-08 PROCEDURE — 2550000001 HC RX 255 CONTRASTS

## 2025-07-08 PROCEDURE — 71275 CT ANGIOGRAPHY CHEST: CPT

## 2025-07-08 RX ADMIN — IOHEXOL 75 ML: 350 INJECTION, SOLUTION INTRAVENOUS at 11:56

## 2025-07-12 NOTE — PROGRESS NOTES
Chief Complaint  Surveillance visit    HPI:   Mr. Marnie Jha is an 70 y.o. male, who is a patient of Dr. Tre Benavidez, DO   I have been asked to see them to evaluate aortic aneurysm.  He has a history of prior coronary bypass in 2018, as well as hypertension and at least one episode of paroxysmal atrial fibrillation. He has no complains of MANSFIELD, no CP, no syncope and no back pain.       Medical History[1]    Surgical History[2]    Family History[3]    Social History     Socioeconomic History    Marital status:      Spouse name: Not on file    Number of children: Not on file    Years of education: Not on file    Highest education level: Not on file   Occupational History    Not on file   Tobacco Use    Smoking status: Never     Passive exposure: Current (wife)    Smokeless tobacco: Never   Vaping Use    Vaping status: Never Used   Substance and Sexual Activity    Alcohol use: Not Currently    Drug use: Not Currently    Sexual activity: Not on file   Other Topics Concern    Not on file   Social History Narrative    Not on file     Social Drivers of Health     Financial Resource Strain: Low Risk  (12/22/2024)    Overall Financial Resource Strain (CARDIA)     Difficulty of Paying Living Expenses: Not hard at all   Food Insecurity: No Food Insecurity (7/16/2024)    Hunger Vital Sign     Worried About Running Out of Food in the Last Year: Never true     Ran Out of Food in the Last Year: Never true   Transportation Needs: No Transportation Needs (12/22/2024)    PRAPARE - Transportation     Lack of Transportation (Medical): No     Lack of Transportation (Non-Medical): No   Physical Activity: Not on file   Stress: Not on file   Social Connections: Not on file   Intimate Partner Violence: Not At Risk (12/22/2024)    Humiliation, Afraid, Rape, and Kick questionnaire     Fear of Current or Ex-Partner: No     Emotionally Abused: No     Physically Abused: No     Sexually Abused: No   Housing Stability: Low Risk   (12/22/2024)    Housing Stability Vital Sign     Unable to Pay for Housing in the Last Year: No     Number of Times Moved in the Last Year: 0     Homeless in the Last Year: No       RX Allergies[4]    Encounter Medications[5]    Physical Exam  Constitutional:       Appearance: Normal appearance.   HENT:      Head: Normocephalic.   Eyes:      General: No scleral icterus.     Pupils: Pupils are equal, round, and reactive to light.      Comments: Corrective lenses.    Pulmonary:      Effort: Pulmonary effort is normal.   Abdominal:      General: Abdomen is flat.      Palpations: Abdomen is soft.   Musculoskeletal:         General: Normal range of motion.   Skin:     General: Skin is warm and dry.   Neurological:      Mental Status: He is alert and oriented to person, place, and time.   Psychiatric:         Mood and Affect: Mood normal.         Encounter Date: 06/03/25   ECG 12 Lead   Result Value    Ventricular Rate 75    Atrial Rate 75    MO Interval 202    QRS Duration 98    QT Interval 410    QTC Calculation(Bazett) 457    P Axis 69    R Axis -11    T Axis 92    QRS Count 12    Q Onset 214    P Onset 136    P Offset 186    T Offset 419    QTC Fredericia 441    Narrative    Atrial-paced rhythm  Incomplete right bundle branch block  Inferior infarct , age undetermined  ST & T wave abnormality, consider anterolateral ischemia  Abnormal ECG  When compared with ECG of 21-DEC-2024 22:42,  PREVIOUS ECG IS PRESENT  Confirmed by Bassme Méndez [id number]) on 6/3/2025 4:31:05 PM       Lab Results   Component Value Date    WBC 6.3 06/05/2025    HGB 14.3 06/05/2025    HCT 42.8 06/05/2025    MCV 95.3 06/05/2025     06/05/2025     Lab Results   Component Value Date    GLUCOSE 102 (H) 06/05/2025    CALCIUM 9.1 06/05/2025     06/05/2025    K 4.2 06/05/2025    CO2 23 06/05/2025     06/05/2025    BUN 33 (H) 06/05/2025    CREATININE 1.54 (H) 06/05/2025     TTE 6/11/25: Left ventricle is normal in size and function.  The  left atrium is markedly dilated at 6.1 x 5.2 cm.  No significant valvular disease.    CT chest 7/8/25: Normal arch  branching pattern, no significant calcification.  Annulus 2.1 x 3 cm  Root (Sinus of Valsalva)  3.9 cm  Sinotubular junction  3.5 cm  Mid ascending  4.7 cm  Distal ascending  3.3 cm  Mid transverse arch  3.2 cm  Isthmus  3 cm  Mid descending  2.6 cm  Distal descending (hiatus)  2.6 cm    Assessment and Plan:   Mr. Marnie Jha is an 70 y.o. male who presents with stable ascending aortic aneurysm.  This is associated with HTN, HLD, CAD, ROBERT; and in the setting of paroxsymal atrial fibrillation, obesity, benign neuroendocrine tumor.  His has no symptoms.  His imaging is consistent with stable aortic aneurysm.     We discussed the natural history of aortic aneurysm disease, and the need for follow-up and surveillance. We discussed that hypertension is the leading cause of aneurysmal growth, and the importance of maintaining  blood pressure in a normal range. I discussed that our goal should be a systolic blood pressure of 145 mmHg or less, and a diastolic blood pressure of 80 mmHg or less. We also discussed cardiovascular activities that are permissible and in fact encouraged for weight loss, and the avoidance of heavy lifting and increases in intrathoracic pressure. We also discussed that current guidelines state a diameter of 55 mm is when we should discuss surgical intervention.  If he has growth of his aneurysm this may require surgical intervention.      We will plan for surveillance with a CT in one year, with a follow visit afterwards to go over results.            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 Assessment: on meds for control ,stable,monitored pePCP  /87,pulse 60      Hyperlipidemia 10/06/2023    Last Assessment & Plan: Formatting of this note might be different from the original. Assessment: on med for control ,currently stable      Obstructive sleep apnea syndrome 03/04/2024    Paroxysmal atrial fibrillation (Multi) 11/20/2023    Personal history of other diseases of the circulatory system     History of cardiac disorder    Personal history of other specified conditions     History of chest pain    Pulmonary emphysema, unspecified emphysema type (Multi) 11/07/2024    Secretory diarrhea 02/07/2024   [2]   Past Surgical History:  Procedure Laterality Date    CT ANGIO NECK  3/19/2022    CT NECK ANGIO W AND WO IV CONTRAST 3/19/2022 [facility]    CT HEAD ANGIO W AND WO IV CONTRAST  3/19/2022    CT HEAD ANGIO W AND WO IV CONTRAST 3/19/2022 [facility]    OTHER SURGICAL HISTORY  01/17/2020    Cardiac catheterization    OTHER SURGICAL HISTORY  01/17/2020    Heart surgery    OTHER SURGICAL HISTORY  07/30/2021    Laparoscopic sigmoidectomy   [3]   Family History  Problem Relation Name Age of Onset    Hypertension Mother      Hypertension Father      Other (cardiac disorder) Father      Coronary artery disease Father     [4]   Allergies  Allergen Reactions    Demerol [Meperidine] Nausea/vomiting   [5]   Outpatient Encounter Medications as of 7/15/2025   Medication Sig Dispense Refill    amLODIPine (Norvasc) 5 mg tablet TAKE ONE TABLET BY MOUTH EVERY DAY 90 tablet 1    aspirin 81 mg EC tablet Take 1 tablet (81 mg) by mouth once daily.      atorvastatin (Lipitor) 80 mg tablet TAKE ONE TABLET BY MOUTH EVERY DAY 90 tablet 1    buPROPion SR (Wellbutrin SR) 150 mg 12 hr tablet TAKE ONE TABLET BY MOUTH TWO TIMES A DAY . DO NOT CRUSH, CHEW OR  SPLIT 60 tablet 3    citalopram (CeleXA) 40 mg tablet TAKE ONE TABLET BY MOUTH EVERY DAY 90 tablet 1    clonazePAM (KlonoPIN) 1 mg tablet Take 1 tablet (1 mg) by mouth 2 times a day. 60 tablet 2    esomeprazole (NexIUM) 20 mg DR capsule Take 1 capsule (20 mg) by mouth 2 times a day. Do not open capsule.      famotidine (Pepcid) 20 mg tablet Take 1 tablet (20 mg) by mouth as needed at bedtime for heartburn.      gabapentin (Neurontin) 300 mg capsule TAKE ONE CAPSULE BY MOUTH TWICE A  capsule 0    lisinopril 20 mg tablet TAKE ONE TABLET BY MOUTH EVERY DAY 90 tablet 1    metoprolol succinate XL (Toprol-XL) 25 mg 24 hr tablet TAKE ONE TABLET BY MOUTH EVERY DAY 90 tablet 1    modafinil (ProvigiL) 200 mg tablet Take 1 tablet (200 mg) by mouth once daily for 14 days. 14 tablet 0     No facility-administered encounter medications on file as of 7/15/2025.

## 2025-07-15 ENCOUNTER — OFFICE VISIT (OUTPATIENT)
Dept: CARDIAC SURGERY | Facility: CLINIC | Age: 70
End: 2025-07-15
Payer: MEDICARE

## 2025-07-15 VITALS
DIASTOLIC BLOOD PRESSURE: 78 MMHG | HEART RATE: 77 BPM | TEMPERATURE: 98.4 F | SYSTOLIC BLOOD PRESSURE: 113 MMHG | OXYGEN SATURATION: 97 % | HEIGHT: 64 IN | WEIGHT: 190 LBS | BODY MASS INDEX: 32.44 KG/M2

## 2025-07-15 DIAGNOSIS — I71.21 ANEURYSM OF THE ASCENDING AORTA, WITHOUT RUPTURE: ICD-10-CM

## 2025-07-15 DIAGNOSIS — I71.43 INFRARENAL ABDOMINAL AORTIC ANEURYSM, WITHOUT RUPTURE: Primary | ICD-10-CM

## 2025-07-15 PROCEDURE — 1036F TOBACCO NON-USER: CPT | Performed by: THORACIC SURGERY (CARDIOTHORACIC VASCULAR SURGERY)

## 2025-07-15 PROCEDURE — 1126F AMNT PAIN NOTED NONE PRSNT: CPT | Performed by: THORACIC SURGERY (CARDIOTHORACIC VASCULAR SURGERY)

## 2025-07-15 PROCEDURE — 99212 OFFICE O/P EST SF 10 MIN: CPT | Performed by: THORACIC SURGERY (CARDIOTHORACIC VASCULAR SURGERY)

## 2025-07-15 PROCEDURE — 1159F MED LIST DOCD IN RCRD: CPT | Performed by: THORACIC SURGERY (CARDIOTHORACIC VASCULAR SURGERY)

## 2025-07-15 PROCEDURE — 3008F BODY MASS INDEX DOCD: CPT | Performed by: THORACIC SURGERY (CARDIOTHORACIC VASCULAR SURGERY)

## 2025-07-15 PROCEDURE — 3078F DIAST BP <80 MM HG: CPT | Performed by: THORACIC SURGERY (CARDIOTHORACIC VASCULAR SURGERY)

## 2025-07-15 PROCEDURE — 99215 OFFICE O/P EST HI 40 MIN: CPT | Performed by: THORACIC SURGERY (CARDIOTHORACIC VASCULAR SURGERY)

## 2025-07-15 PROCEDURE — 3074F SYST BP LT 130 MM HG: CPT | Performed by: THORACIC SURGERY (CARDIOTHORACIC VASCULAR SURGERY)

## 2025-07-15 ASSESSMENT — PAIN SCALES - GENERAL: PAINLEVEL_OUTOF10: 0-NO PAIN

## 2025-07-15 ASSESSMENT — ENCOUNTER SYMPTOMS
LOSS OF SENSATION IN FEET: 0
DEPRESSION: 1
OCCASIONAL FEELINGS OF UNSTEADINESS: 0

## 2025-07-15 NOTE — LETTER
"July 15, 2025     Tre Benavidez DO  [address] [facility] [id number]    Patient: Marnie Jha   YOB: 1955   Date of Visit: 7/15/2025       Dear Dr. Tre Benavidez, DO:    Thank you for referring Marnie Jha to me for evaluation. As a BTW he complains of what sounds like narcolepsy.  He was diagnosed with a \"sleep disorder\" but has discontinued his CPAP.  He is interested in further work-up of his sleep disorder.  I will defer this to you please.  On a positive note his ascending aorta is stable.  I will check it in one year.  Below are my notes for this consultation.  If you have questions, please do not hesitate to call me. I look forward to following your patient along with you.       Sincerely,     Jaleel Segal MD      CC: Monica Orellana, APRN-CNP  ______________________________________________________________________________________    Chief Complaint  Surveillance visit    HPI:   Mr. Marnie Jha is an 70 y.o. male, who is a patient of Dr. Tre Benavidez DO   I have been asked to see them to evaluate aortic aneurysm.  He has a history of prior coronary bypass in 2018, as well as hypertension and at least one episode of paroxysmal atrial fibrillation. He has no complains of MANSFIELD, no CP, no syncope and no back pain.       Medical History[1]    Surgical History[2]    Family History[3]    Social History     Socioeconomic History   • Marital status:      Spouse name: Not on file   • Number of children: Not on file   • Years of education: Not on file   • Highest education level: Not on file   Occupational History   • Not on file   Tobacco Use   • Smoking status: Never     Passive exposure: Current (wife)   • Smokeless tobacco: Never   Vaping Use   • Vaping status: Never Used   Substance and Sexual Activity   • Alcohol use: Not Currently   • Drug use: Not Currently   • Sexual activity: Not on file   Other Topics Concern   • Not on file   Social History Narrative   • Not on file     Social Drivers " of Health     Financial Resource Strain: Low Risk  (12/22/2024)    Overall Financial Resource Strain (CARDIA)    • Difficulty of Paying Living Expenses: Not hard at all   Food Insecurity: No Food Insecurity (7/16/2024)    Hunger Vital Sign    • Worried About Running Out of Food in the Last Year: Never true    • Ran Out of Food in the Last Year: Never true   Transportation Needs: No Transportation Needs (12/22/2024)    PRAPARE - Transportation    • Lack of Transportation (Medical): No    • Lack of Transportation (Non-Medical): No   Physical Activity: Not on file   Stress: Not on file   Social Connections: Not on file   Intimate Partner Violence: Not At Risk (12/22/2024)    Humiliation, Afraid, Rape, and Kick questionnaire    • Fear of Current or Ex-Partner: No    • Emotionally Abused: No    • Physically Abused: No    • Sexually Abused: No   Housing Stability: Low Risk  (12/22/2024)    Housing Stability Vital Sign    • Unable to Pay for Housing in the Last Year: No    • Number of Times Moved in the Last Year: 0    • Homeless in the Last Year: No       RX Allergies[4]    Encounter Medications[5]    Physical Exam  Constitutional:       Appearance: Normal appearance.   HENT:      Head: Normocephalic.   Eyes:      General: No scleral icterus.     Pupils: Pupils are equal, round, and reactive to light.      Comments: Corrective lenses.    Pulmonary:      Effort: Pulmonary effort is normal.   Abdominal:      General: Abdomen is flat.      Palpations: Abdomen is soft.   Musculoskeletal:         General: Normal range of motion.   Skin:     General: Skin is warm and dry.   Neurological:      Mental Status: He is alert and oriented to person, place, and time.   Psychiatric:         Mood and Affect: Mood normal.         Encounter Date: 06/03/25   ECG 12 Lead   Result Value    Ventricular Rate 75    Atrial Rate 75    MN Interval 202    QRS Duration 98    QT Interval 410    QTC Calculation(Bazett) 457    P Axis 69    R Axis -11     T Axis 92    QRS Count 12    Q Onset 214    P Onset 136    P Offset 186    T Offset 419    QTC Fredericia 441    Narrative    Atrial-paced rhythm  Incomplete right bundle branch block  Inferior infarct , age undetermined  ST & T wave abnormality, consider anterolateral ischemia  Abnormal ECG  When compared with ECG of 21-DEC-2024 22:42,  PREVIOUS ECG IS PRESENT  Confirmed by Bassem Méndez [id number]) on 6/3/2025 4:31:05 PM       Lab Results   Component Value Date    WBC 6.3 06/05/2025    HGB 14.3 06/05/2025    HCT 42.8 06/05/2025    MCV 95.3 06/05/2025     06/05/2025     Lab Results   Component Value Date    GLUCOSE 102 (H) 06/05/2025    CALCIUM 9.1 06/05/2025     06/05/2025    K 4.2 06/05/2025    CO2 23 06/05/2025     06/05/2025    BUN 33 (H) 06/05/2025    CREATININE 1.54 (H) 06/05/2025     TTE 6/11/25: Left ventricle is normal in size and function.  The left atrium is markedly dilated at 6.1 x 5.2 cm.  No significant valvular disease.    CT chest 7/8/25: Normal arch  branching pattern, no significant calcification.  Annulus 2.1 x 3 cm  Root (Sinus of Valsalva)  3.9 cm  Sinotubular junction  3.5 cm  Mid ascending  4.7 cm  Distal ascending  3.3 cm  Mid transverse arch  3.2 cm  Isthmus  3 cm  Mid descending  2.6 cm  Distal descending (hiatus)  2.6 cm    Assessment and Plan:   Mr. Marnie Jha is an 70 y.o. male who presents with stable ascending aortic aneurysm.  This is associated with HTN, HLD, CAD, ROBERT; and in the setting of paroxsymal atrial fibrillation, obesity, benign neuroendocrine tumor.  His has no symptoms.  His imaging is consistent with stable aortic aneurysm.     We discussed the natural history of aortic aneurysm disease, and the need for follow-up and surveillance. We discussed that hypertension is the leading cause of aneurysmal growth, and the importance of maintaining  blood pressure in a normal range. I discussed that our goal should be a systolic blood pressure of 145 mmHg or  less, and a diastolic blood pressure of 80 mmHg or less. We also discussed cardiovascular activities that are permissible and in fact encouraged for weight loss, and the avoidance of heavy lifting and increases in intrathoracic pressure. We also discussed that current guidelines state a diameter of 55 mm is when we should discuss surgical intervention.  If he has growth of his aneurysm this may require surgical intervention.      We will plan for surveillance with a CT in one year, with a follow visit afterwards to go over results.            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 Assessment: on meds for control ,stable,monitored pePCP  /87,pulse 60     • Hyperlipidemia 10/06/2023    Last Assessment & Plan: Formatting of this note might be different from the original. Assessment: on med for control ,currently stable     • Obstructive sleep apnea syndrome 03/04/2024   • Paroxysmal atrial fibrillation (Multi) 11/20/2023   • Personal history of other diseases of the circulatory system     History of cardiac disorder   • Personal history of other specified conditions     History of chest pain   • Pulmonary emphysema, unspecified emphysema type (Multi) 11/07/2024   • Secretory diarrhea 02/07/2024   [2]  Past Surgical History:  Procedure Laterality Date   • CT ANGIO NECK  3/19/2022    CT NECK ANGIO W AND WO IV CONTRAST 3/19/2022 [facility]   • CT HEAD ANGIO W AND WO IV CONTRAST  3/19/2022    CT HEAD ANGIO W AND WO IV CONTRAST 3/19/2022 [facility]   • OTHER SURGICAL HISTORY  01/17/2020    Cardiac catheterization   • OTHER SURGICAL HISTORY  01/17/2020    Heart surgery   • OTHER SURGICAL HISTORY  07/30/2021    Laparoscopic sigmoidectomy   [3]  Family History  Problem Relation Name Age of Onset   • Hypertension Mother     • Hypertension Father     • Other (cardiac disorder) Father     • Coronary artery disease Father     [4]  Allergies  Allergen Reactions   • Demerol [Meperidine] Nausea/vomiting   [5]  Outpatient Encounter Medications as of 7/15/2025   Medication Sig Dispense Refill   • amLODIPine (Norvasc) 5 mg tablet TAKE ONE TABLET BY MOUTH EVERY DAY 90 tablet 1   • aspirin 81 mg EC tablet Take 1 tablet (81 mg) by mouth once daily.     • atorvastatin (Lipitor) 80 mg tablet TAKE ONE TABLET BY MOUTH EVERY DAY 90 tablet 1   • buPROPion SR (Wellbutrin SR) 150 mg 12 hr tablet TAKE ONE TABLET BY MOUTH TWO TIMES A DAY . DO NOT CRUSH, CHEW OR SPLIT 60 tablet 3   • citalopram (CeleXA) 40 mg tablet TAKE ONE TABLET BY MOUTH EVERY DAY 90 tablet 1   • clonazePAM (KlonoPIN) 1 mg tablet Take 1 tablet (1 mg) by mouth 2 times a day. 60 tablet 2   • esomeprazole (NexIUM) 20 mg DR capsule Take 1 capsule (20 mg) by mouth 2 times a day. Do not open capsule.     • famotidine (Pepcid) 20 mg tablet Take 1 tablet (20 mg) by mouth as needed at bedtime for heartburn.     • gabapentin (Neurontin) 300 mg capsule TAKE ONE CAPSULE BY MOUTH TWICE A  capsule 0   • lisinopril 20 mg tablet TAKE ONE TABLET BY MOUTH EVERY DAY 90 tablet 1   • metoprolol succinate XL (Toprol-XL) 25 mg 24 hr tablet TAKE ONE TABLET BY MOUTH EVERY DAY 90 tablet 1   • modafinil (ProvigiL) 200 mg tablet Take 1 tablet (200 mg) by mouth once daily for 14 days. 14 tablet 0     No facility-administered encounter medications on file as of 7/15/2025.        [1]  Past Medical History:  Diagnosis Date   • Ascending aorta dilatation 11/20/2023   • Benign neuroendocrine tumor 08/08/2023   • Benign neuroendocrine  tumor of duodenum 10/06/2023   • Bradycardia 10/06/2023   • CAD (coronary artery disease) 10/06/2023   • Class 1 obesity with body mass index (BMI) of 32.0 to 32.9 in adult 05/12/2025   • History of colonic polyps 02/07/2024   • HTN (hypertension) 10/06/2023    Last Assessment & Plan: Formatting of this note might be different from the original. Assessment: on meds for control ,stable,monitored pePCP  /87,pulse 60     • Hyperlipidemia 10/06/2023    Last Assessment & Plan: Formatting of this note might be different from the original. Assessment: on med for control ,currently stable     • Obstructive sleep apnea syndrome 03/04/2024   • Paroxysmal atrial fibrillation (Multi) 11/20/2023   • Personal history of other diseases of the circulatory system     History of cardiac disorder   • Personal history of other specified conditions     History of chest pain   • Pulmonary emphysema, unspecified emphysema type (Multi) 11/07/2024   • Secretory diarrhea 02/07/2024   [2]  Past Surgical History:  Procedure Laterality Date   • CT ANGIO NECK  3/19/2022    CT NECK ANGIO W AND WO IV CONTRAST 3/19/2022 [facility]   • CT HEAD ANGIO W AND WO IV CONTRAST  3/19/2022    CT HEAD ANGIO W AND WO IV CONTRAST 3/19/2022 [facility]   • OTHER SURGICAL HISTORY  01/17/2020    Cardiac catheterization   • OTHER SURGICAL HISTORY  01/17/2020    Heart surgery   • OTHER SURGICAL HISTORY  07/30/2021    Laparoscopic sigmoidectomy   [3]  Family History  Problem Relation Name Age of Onset   • Hypertension Mother     • Hypertension Father     • Other (cardiac disorder) Father     • Coronary artery disease Father     [4]  Allergies  Allergen Reactions   • Demerol [Meperidine] Nausea/vomiting   [5]  Outpatient Encounter Medications as of 7/15/2025   Medication Sig Dispense Refill   • amLODIPine (Norvasc) 5 mg tablet TAKE ONE TABLET BY MOUTH EVERY DAY 90 tablet 1   • aspirin 81 mg EC tablet Take 1 tablet (81 mg) by mouth once daily.      • atorvastatin (Lipitor) 80 mg tablet TAKE ONE TABLET BY MOUTH EVERY DAY 90 tablet 1   • buPROPion SR (Wellbutrin SR) 150 mg 12 hr tablet TAKE ONE TABLET BY MOUTH TWO TIMES A DAY . DO NOT CRUSH, CHEW OR SPLIT 60 tablet 3   • citalopram (CeleXA) 40 mg tablet TAKE ONE TABLET BY MOUTH EVERY DAY 90 tablet 1   • clonazePAM (KlonoPIN) 1 mg tablet Take 1 tablet (1 mg) by mouth 2 times a day. 60 tablet 2   • esomeprazole (NexIUM) 20 mg DR capsule Take 1 capsule (20 mg) by mouth 2 times a day. Do not open capsule.     • famotidine (Pepcid) 20 mg tablet Take 1 tablet (20 mg) by mouth as needed at bedtime for heartburn.     • gabapentin (Neurontin) 300 mg capsule TAKE ONE CAPSULE BY MOUTH TWICE A  capsule 0   • lisinopril 20 mg tablet TAKE ONE TABLET BY MOUTH EVERY DAY 90 tablet 1   • metoprolol succinate XL (Toprol-XL) 25 mg 24 hr tablet TAKE ONE TABLET BY MOUTH EVERY DAY 90 tablet 1   • modafinil (ProvigiL) 200 mg tablet Take 1 tablet (200 mg) by mouth once daily for 14 days. 14 tablet 0     No facility-administered encounter medications on file as of 7/15/2025.

## 2025-08-04 ENCOUNTER — LAB (OUTPATIENT)
Dept: LAB | Facility: CLINIC | Age: 70
End: 2025-08-04
Payer: MEDICARE

## 2025-08-04 ENCOUNTER — OFFICE VISIT (OUTPATIENT)
Dept: HEMATOLOGY/ONCOLOGY | Facility: CLINIC | Age: 70
End: 2025-08-04
Payer: MEDICARE

## 2025-08-04 VITALS
HEART RATE: 74 BPM | HEIGHT: 64 IN | DIASTOLIC BLOOD PRESSURE: 75 MMHG | SYSTOLIC BLOOD PRESSURE: 115 MMHG | WEIGHT: 194 LBS | TEMPERATURE: 97.2 F | RESPIRATION RATE: 16 BRPM | OXYGEN SATURATION: 97 % | BODY MASS INDEX: 33.12 KG/M2

## 2025-08-04 DIAGNOSIS — C18.9 COLON ADENOCARCINOMA: ICD-10-CM

## 2025-08-04 DIAGNOSIS — D3A.8 BENIGN NEUROENDOCRINE TUMOR OF DUODENUM: ICD-10-CM

## 2025-08-04 LAB
ALBUMIN SERPL BCP-MCNC: 4.2 G/DL (ref 3.4–5)
ALP SERPL-CCNC: 60 U/L (ref 33–136)
ALT SERPL W P-5'-P-CCNC: 21 U/L (ref 10–52)
ANION GAP SERPL CALC-SCNC: 9 MMOL/L (ref 10–20)
AST SERPL W P-5'-P-CCNC: 18 U/L (ref 9–39)
BASOPHILS # BLD AUTO: 0.03 X10*3/UL (ref 0–0.1)
BASOPHILS NFR BLD AUTO: 0.5 %
BILIRUB SERPL-MCNC: 0.5 MG/DL (ref 0–1.2)
BUN SERPL-MCNC: 19 MG/DL (ref 6–23)
CALCIUM SERPL-MCNC: 9.1 MG/DL (ref 8.6–10.3)
CEA SERPL-MCNC: 1.2 UG/L
CHLORIDE SERPL-SCNC: 103 MMOL/L (ref 98–107)
CO2 SERPL-SCNC: 28 MMOL/L (ref 21–32)
CREAT SERPL-MCNC: 1.32 MG/DL (ref 0.5–1.3)
EGFRCR SERPLBLD CKD-EPI 2021: 58 ML/MIN/1.73M*2
EOSINOPHIL # BLD AUTO: 0.24 X10*3/UL (ref 0–0.7)
EOSINOPHIL NFR BLD AUTO: 4 %
ERYTHROCYTE [DISTWIDTH] IN BLOOD BY AUTOMATED COUNT: 13.2 % (ref 11.5–14.5)
GLUCOSE SERPL-MCNC: 147 MG/DL (ref 74–99)
HCT VFR BLD AUTO: 42.5 % (ref 41–52)
HGB BLD-MCNC: 13.8 G/DL (ref 13.5–17.5)
IMM GRANULOCYTES # BLD AUTO: 0.02 X10*3/UL (ref 0–0.7)
IMM GRANULOCYTES NFR BLD AUTO: 0.3 % (ref 0–0.9)
LYMPHOCYTES # BLD AUTO: 2.05 X10*3/UL (ref 1.2–4.8)
LYMPHOCYTES NFR BLD AUTO: 34.1 %
MCH RBC QN AUTO: 30.9 PG (ref 26–34)
MCHC RBC AUTO-ENTMCNC: 32.5 G/DL (ref 32–36)
MCV RBC AUTO: 95 FL (ref 80–100)
MONOCYTES # BLD AUTO: 0.42 X10*3/UL (ref 0.1–1)
MONOCYTES NFR BLD AUTO: 7 %
NEUTROPHILS # BLD AUTO: 3.25 X10*3/UL (ref 1.2–7.7)
NEUTROPHILS NFR BLD AUTO: 54.1 %
NRBC BLD-RTO: 0 /100 WBCS (ref 0–0)
PLATELET # BLD AUTO: 170 X10*3/UL (ref 150–450)
POTASSIUM SERPL-SCNC: 3.9 MMOL/L (ref 3.5–5.3)
PROT SERPL-MCNC: 6.7 G/DL (ref 6.4–8.2)
RBC # BLD AUTO: 4.47 X10*6/UL (ref 4.5–5.9)
SODIUM SERPL-SCNC: 136 MMOL/L (ref 136–145)
WBC # BLD AUTO: 6 X10*3/UL (ref 4.4–11.3)

## 2025-08-04 PROCEDURE — 82378 CARCINOEMBRYONIC ANTIGEN: CPT | Mod: PARLAB

## 2025-08-04 PROCEDURE — 3078F DIAST BP <80 MM HG: CPT | Performed by: INTERNAL MEDICINE

## 2025-08-04 PROCEDURE — 36415 COLL VENOUS BLD VENIPUNCTURE: CPT

## 2025-08-04 PROCEDURE — 3008F BODY MASS INDEX DOCD: CPT | Performed by: INTERNAL MEDICINE

## 2025-08-04 PROCEDURE — 3074F SYST BP LT 130 MM HG: CPT | Performed by: INTERNAL MEDICINE

## 2025-08-04 PROCEDURE — 80053 COMPREHEN METABOLIC PANEL: CPT

## 2025-08-04 PROCEDURE — 85025 COMPLETE CBC W/AUTO DIFF WBC: CPT

## 2025-08-04 PROCEDURE — 99214 OFFICE O/P EST MOD 30 MIN: CPT | Performed by: INTERNAL MEDICINE

## 2025-08-04 PROCEDURE — 1126F AMNT PAIN NOTED NONE PRSNT: CPT | Performed by: INTERNAL MEDICINE

## 2025-08-04 PROCEDURE — 1159F MED LIST DOCD IN RCRD: CPT | Performed by: INTERNAL MEDICINE

## 2025-08-04 ASSESSMENT — PAIN SCALES - GENERAL: PAINLEVEL_OUTOF10: 0-NO PAIN

## 2025-08-04 NOTE — PROGRESS NOTES
Cancer History:   Treatment Synopsis:    E. RECTOSIGMOID COLON MASS, BIOPSY: ADENOCARCINOMA.   MISMATCH REPAIR PROTEIN EXPRESSION:     Protein: Result:   MLH-1 Expression Present    PMS-2 Expression Present   MSH-2 Expression Present   MSH-6 Expression Present     INTERPRETATION: Colon neoplasm with normal mismatch   repair protein expression.     FINAL DIAGNOSIS   A. SIGMOID COLON STAPLE LINE DISTAL:    --MODERATELY DIFFERENTIATED COLONIC ADENOCARCINOMA, 5.0 CM, WITH INVASION   THROUGH THE MUSCULARIS PROPRIA AND INTO PERICOLIC TISSUE, SEE SUMMARY.   --MARGINS OF EXCISION ARE NEGATIVE FOR CARCINOMA.   --MESENTERIC LYMPH NODES ARE NEGATIVE  FOR MALIGNANCY (0/11), SEE COMMENT.   --TUMOR DEPOSITS, TWO.      Procedure: Sigmoidectomy   Tumor Site: Sigmoid colon   Tumor Size: 5.0 cm   Macroscopic Tumor Perforation: Not identified   Histologic Type: Adenocarcinoma    Histologic Grade: G2   Tumor Extension: Tumor invades through the muscularis propria into pericolonic   tissue   Margins: All margins are uninvolved by invasive carcinoma, high grade   dysplasia/intramucosal carcinoma, and low grade dysplasia    Treatment Effect: No known presurgical therapy   Lymphovascular Invasion: Not identified   Perineural Invasion: Not identified   Tumor Deposits: Present, 2 deposits   Number of Lymph Nodes Involved: 0   Number of Lymph Nodes Examined:  11   Pathologic Staging (pTNM): pT3 pN1c   pt3, pN1c, M0 stage IIIB, July 2020  Duodenal nodule fine-needle biopsy revealed neuroendocrine tumor grade 1 on August 24, 2020  Case is Amended   FINAL DIAGNOSIS   A. 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     The patient is a 65 year old man with a past medical history significant for CAD, HTN, Hyperlipidemia, dizziness fatigue and cancer.  The patient was doing well until he started having bowel movement complaints in early July, which prompted a colonoscopy  that revealed a mass in the sigmoid colon. He also obtained laparoscopic sigmoidectomy that revealed sigmoid mass and 2 tumor deposits, 0 out of 14 lymph nodes were positive. CT scan did not reveal  any metastatic disease but an upper endoscopy did reveal  duodenal nodules. The patient underwent nodule removal and is now 2 weeks s/p the surgery.     The patient had called for a follow up on 8/7/2024 regarding his history of colon cancer s/p resection.  Recall at last visit we discussed that his upper endoscopy and biopsy of duodenal nodule revealed grade 1 neuroendocrine tumor.  The patient has received  cycle 12 of adjuvant chemotherapy using FOLFOX and been tolerating well. 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 The patient reports flank  pain but is overall doing well. He denies any history of abdominal pain, diarrhea, wheezing and lymphadenopathy.     The patient had called for a follow up on 8/4/25 regarding  history of colon cancer s/p resection.  Recall at last visit we discussed that his upper endoscopy and biopsy of duodenal nodule revealed grade 1 neuroendocrine tumor.  The patient has received  cycle 12 of adjuvant chemotherapy using FOLFOX.  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 The patient reports flank  pain but is overall doing well. He denies any history of abdominal pain, diarrhea, wheezing and lymphadenopathy.     Past Medical History:  1. Adenocarcinoma of the sigmoid colon  2. CAD   3. HTN   4. Hyperlipidemia  5. Dizziness   6. Fatigue  7. Left Kidney lesion  8. Anxiety and Depression       Past Surgical History:  1. CABG (quadruple bypass)  2. Cholecystectomy   3. Lesion removals  4.  Status post pacemaker insertion in May 2022 for bradycardia and dizziness.  Stress test was reportedly normal.  Family Medical History:  1. CAD   2. HTN     Last colonoscopy was in 07/2020.           Review of Systems:   ·  System Review All other systems have been reviewed and are negative for complaint.      · Constitutional NEGATIVE: Fever, Chills, Anorexia, Weight Loss, Malaise      · Eyes NEGATIVE: Blurry Vision, Drainage, Diploplia, Redness, Vision Loss/ Change      · ENMT NEGATIVE: Nasal Discharge, Nasal Congestion, Ear Pain, Mouth Pain, Throat Pain      · Respiratory NEGATIVE: Dry Cough, Productive Cough, Hemoptysis, Wheezing, Shortness of Breath      · Cardiology NEGATIVE: Chest Pain, Dyspnea on Exertion, Orthopnea, Palpitations, Syncope      ·  Gastrointestinal POSITIVE: Nausea     NEGATIVE: Abdominal Pain, Constipation, Diarrhea, Vomiting      · Genitourinary NEGATIVE: Discharge, Dysuria, Flank Pain, Frequency, Hematuria      · Musculoskeletal NEGATIVE: Decreased ROM, Pain, Swelling, Stiffness, Weakness      · Neurological NEGATIVE: Dizziness, Confusion, Headache, Seizures, Syncope      · Psychiatric NEGATIVE: Mood Changes, Anxiety, Hallucinations, Sleep Changes, Suicidal Ideas      · Skin NEGATIVE: Mass, Pain, Pruritus, Rash, Ulcer      · Endocrine NEGATIVE: Heat Intolerance, Cold Intolerance, Sweat, Polyuria, Thirst      · Hematologic/Lymph NEGATIVE: Anemia, Bruising, Easy Bleeding, Night Sweats, Petechiae      ·  Allergic/Immunologic NEGATIVE: Anaphylaxis, Itchy/ Teary Eyes, Itching, Sneezing, Swelling      · Breast NEGATIVE: Pain, Mass, Discharge, Nipple Itching, Gynecomastia         Allergies and Intolerances:       Intolerances:         Demerol: Drug, Nausea/Vomiting, Active     Outpatient Medication Profile:  * Patient Currently Takes Medications as of 08-Aug-2023 10:21 documented in Structured Notes         MiraLax oral powder for reconstitution : Last Dose Taken:  , 17 gram(s) orally once a day, As needed, Constipation, Start Date: 30-Mar-2023         Colace 100 mg oral capsule: Last Dose Taken:  , 1 cap(s) orally 2 times  a day as needed for constipation , Start Date: 30-Mar-2023         Metoprolol Succinate ER 25 mg oral tablet, extended release: Last Dose  Taken:  , 1 tab(s) orally once a day, Start Date: 30-Mar-2023         acetaminophen 325 mg oral tablet: Last Dose Taken:  , 2 tab(s) orally  every 4 hours, As needed, Pain - Mild (1-3), Start Date: 26-Apr-2022         atorvastatin 80 mg oral tablet: Last Dose Taken:  , 1 tab(s) orally once  a day (at bedtime)         amLODIPine 10 mg oral tablet: Last Dose Taken:  , 1 tab(s) orally once  a day (in the morning)         lisinopril 20 mg oral tablet: Last Dose Taken:  , 1 tab(s) orally once  a day (in the morning)         citalopram 40 mg oral tablet: Last Dose Taken:  , 1 tab(s) orally once  a day (in the morning)         gabapentin 300 mg oral capsule: Last Dose Taken:  , 1 cap(s) orally 2  times a day         omeprazole 20 mg oral delayed release tablet: Last Dose Taken:  , 1 tab(s)  orally 2 times a day         famotidine 20 mg oral tablet: Last Dose Taken:  , 1 tab(s) orally 2 times  a day         clonazePAM 1 mg oral tablet: Last Dose Taken:  , 1 tab(s) orally in the  morning and at bedtime         aspirin 81 mg oral tablet: Last Dose Taken:  , 1 tab(s) oral once a day             Medical History:         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, Status: Active          Iron deficiency anemia: ICD-10: D50.9, Status: Active         Fatigue: ICD-10: R53.83, Status: Active         Dizziness: ICD-10: R42, Status: Active         Hyperlipidemia: ICD-10: E78.5, Status: Active         HTN (hypertension): ICD-10: I10, Status: Active         CAD (coronary artery disease): ICD-10: I25.10, Status: Active         Colon cancer: ICD-10: C18.9, Status: Active       Surg History:         Low iron: ICD-10: E61.1, Status: Active         Family history of cholecystectomy: ICD-10: Z83.79, Status:  Active         Status post coronary artery bypass graft: ICD-10: Z95.1,  Status: Active     Family History: Family history of coronary artery  disease (Father Age Unknown)  Family history of hypertension (Father Age Unknown)  Family history of hypertension (Mother Age Unknown)      Social History:   Social Substance History:  ·  Smoking Status never smoker (1)   ·  Additional History     66 year old male    with no children  No substance history (1)           Vitals and Measurements:   Vitals: Temp: 36.6  HR: 72  RR: 18  BP: 118/80  SPO2%:   97   Measurements: HT(cm): 159.7  WT(kg): 85.6  BSA: 1.94   BMI:  33.5      Physical Exam:      Constitutional: Well developed, awake/alert/oriented  x3, no distress, alert and cooperative   Eyes: PERRL, EOMI, clear sclera   ENMT: mucous membranes moist, no apparent injury,  no lesions seen   Head/Neck: Neck supple, no apparent injury, thyroid  without mass or tenderness, No JVD, trachea midline, no bruits   Respiratory/Thorax: Patent airways, CTAB, normal  breath sounds with good chest expansion, thorax symmetric   Cardiovascular: Regular, rate and rhythm, no murmurs,  2+ equal pulses of the extremities, normal S 1and S 2   Gastrointestinal: Nondistended, soft, non-tender,  no rebound tenderness or guarding, no masses palpable, no organomegaly, +BS, no bruits   Genitourinary: No Discharge, vesicles or other abnormalities   Musculoskeletal: ROM intact, no joint  swelling, normal  strength   Extremities: normal extremities, no cyanosis edema,  contusions or wounds, no clubbing   Neurological: alert and oriented x3, intact senses,  motor, response and reflexes, normal strength   Breast: No masses, tenderness, no discharge or discoloration   Lymphatic: No significant lymphadenopathy   Psychological: Appropriate mood and behavior   Skin: Warm and dry, no lesions, no rashes         Lab Results:           Radiology Result:     ·  Results           Impression:     CHEST:  1.  No focal infiltrate, pulmonary nodule or lymphadenopathy  identified.  2. Fusiform dilatation of the ascending thoracic aorta, similar to  the prior study.        ABDOMEN-PELVIS:  1.  Postoperative changes, as above.  2. No new or enlarging mass or lymphadenopathy identified.  3. No evidence of bowel obstruction, free intraperitoneal air or  abnormal intra-abdominal fluid collection.      CT Chest Abdomen Pelvis w/wo IV Contrast [Feb 8 2022  1:50PM]        Assessment and Plan:         The patient was referred to for further evaluation and management of recently diagnosed adenocarcinoma of the sigmoid colon.      1. Adenocarcinoma of the sigmoid colon     The patient is a 65 year old man with a past medical history significant for CAD, HTN, Hyperlipidemia, dizziness fatigue and cancer.  The patient was doing well until he started having bowel movement complaints in early July, which prompted a colonoscopy  that revealed a mass in the sigmoid colon. He also obtained laparoscopic studies that revealed 2 tumor deposits. CT scan did not reveal  any metastatic disease but an upper endoscopy did reveal the nodules. The patient underwent nodule removal and is  now 2 weeks s/p the surgery.   We discussed that his diagnosis is stage III adenocarcinoma of the sigmoid colon and that she should initiate adjuvant chemotherapy to achieve absolute benefit. I offered him the following options:  1. Do nothing  2. Adjuvant  chemotherapy using FOLFOX  The patient would like to proceed with chemotherapy every 2 weeks using FOLFOX.  Effect side effects explained.     The patient had called for a follow up on March August 7, 2024. Physical exam was within normal limits. I reviewed the lab data with the patient. Has tolerated Cycle 12 of chemotherapy using FOLFOX the patient is grateful for all the details. 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 The patient remains s/p gastrectomy and resection of the  non-secretory tumor. No treatement indicated for neuroendocrine tumor however, the patient should be followed  clinically.  Restaging CT scan of chest abdomen pelvis in December 2022 did not reveal any evidence of local or distant recurrence, surgery related changes.  The patient is pleased.  CT scan of chest abdomen pelvis on October 2, 2023 did not reveal any evidence of local or distal recurrence return in 6 months.    The patient had called for a follow up on 8/4/25 regarding  history of colon cancer s/p resection.  Recall at last visit we discussed that his upper endoscopy and biopsy of duodenal nodule revealed grade 1 neuroendocrine tumor.  The patient has received  cycle 12 of adjuvant chemotherapy using FOLFOX.  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 The patient reports flank  pain but is overall doing well. He denies any history of abdominal pain, diarrhea, wheezing and lymphadenopathy.     2. CAD   - Managed by PCP, cardiology and cardiac surgery   - Anticoagulation with Lovenox 40 mg   -S/p pacemaker insertion in May 2022  3. HTN   - Managed by PCP, cardiology and cardiac surgery   - Metoprolol 50 mg   - Lisinopril 5 mg      4. Hyperlipidemia  - Atorvastatin 80 mg      5. Dizziness      6. Fatigue     7. Left Kidney lesion      - Surgical  intervention     8. Mental health/ anxiety and depression   - Counseling services   - Celexa 40mg   - Klonopin 1 mg

## 2025-09-02 ENCOUNTER — HOSPITAL ENCOUNTER (OUTPATIENT)
Dept: CARDIOLOGY | Facility: CLINIC | Age: 70
Discharge: HOME | End: 2025-09-02
Payer: MEDICARE

## 2025-09-02 DIAGNOSIS — I49.5 SICK SINUS SYNDROME (MULTI): ICD-10-CM

## 2025-09-02 DIAGNOSIS — Z95.0 CARDIAC PACEMAKER IN SITU: ICD-10-CM

## 2025-09-02 PROCEDURE — 93296 REM INTERROG EVL PM/IDS: CPT

## 2025-09-04 DIAGNOSIS — F41.9 ANXIETY: ICD-10-CM

## 2025-09-04 DIAGNOSIS — G62.9 NEUROPATHY: ICD-10-CM

## 2025-09-04 RX ORDER — CITALOPRAM 40 MG/1
40 TABLET ORAL DAILY
Qty: 90 TABLET | Refills: 1 | Status: SHIPPED | OUTPATIENT
Start: 2025-09-04

## 2025-09-04 RX ORDER — GABAPENTIN 300 MG/1
300 CAPSULE ORAL 2 TIMES DAILY
Qty: 180 CAPSULE | Refills: 0 | Status: SHIPPED | OUTPATIENT
Start: 2025-09-04

## 2025-09-04 RX ORDER — CLONAZEPAM 1 MG/1
1 TABLET ORAL 2 TIMES DAILY
Qty: 60 TABLET | Refills: 2 | Status: SHIPPED | OUTPATIENT
Start: 2025-09-04

## 2025-10-01 ENCOUNTER — APPOINTMENT (OUTPATIENT)
Dept: PRIMARY CARE | Facility: CLINIC | Age: 70
End: 2025-10-01
Payer: MEDICARE

## 2026-02-04 ENCOUNTER — APPOINTMENT (OUTPATIENT)
Dept: HEMATOLOGY/ONCOLOGY | Facility: CLINIC | Age: 71
End: 2026-02-04
Payer: MEDICARE

## 2026-08-04 ENCOUNTER — APPOINTMENT (OUTPATIENT)
Dept: HEMATOLOGY/ONCOLOGY | Facility: CLINIC | Age: 71
End: 2026-08-04
Payer: MEDICARE